# Patient Record
Sex: MALE | Race: WHITE | Employment: OTHER | URBAN - METROPOLITAN AREA
[De-identification: names, ages, dates, MRNs, and addresses within clinical notes are randomized per-mention and may not be internally consistent; named-entity substitution may affect disease eponyms.]

---

## 2020-02-13 ENCOUNTER — HOSPITAL ENCOUNTER (EMERGENCY)
Facility: CLINIC | Age: 65
Discharge: HOME | End: 2020-02-13
Attending: EMERGENCY MEDICINE | Admitting: EMERGENCY MEDICINE
Payer: COMMERCIAL

## 2020-02-13 VITALS
RESPIRATION RATE: 18 BRPM | HEIGHT: 69 IN | OXYGEN SATURATION: 98 % | SYSTOLIC BLOOD PRESSURE: 165 MMHG | TEMPERATURE: 98.5 F | DIASTOLIC BLOOD PRESSURE: 82 MMHG | HEART RATE: 99 BPM

## 2020-02-13 DIAGNOSIS — E87.6 HYPOKALEMIA: ICD-10-CM

## 2020-02-13 DIAGNOSIS — R73.9 HYPERGLYCEMIA: ICD-10-CM

## 2020-02-13 LAB
ALBUMIN SERPL-MCNC: 4.6 G/DL (ref 3.4–5)
ALP SERPL-CCNC: 122 U/L (ref 40–150)
ALT SERPL W P-5'-P-CCNC: 17 U/L (ref 0–70)
ANION GAP SERPL CALCULATED.3IONS-SCNC: 11 MMOL/L (ref 3–14)
AST SERPL W P-5'-P-CCNC: 12 U/L (ref 0–45)
BASE EXCESS BLDV CALC-SCNC: 0.5 MMOL/L
BASOPHILS # BLD AUTO: 0 10E9/L (ref 0–0.2)
BASOPHILS NFR BLD AUTO: 0 %
BILIRUB SERPL-MCNC: 0.4 MG/DL (ref 0.2–1.3)
BUN SERPL-MCNC: 20 MG/DL (ref 7–30)
CALCIUM SERPL-MCNC: 9.8 MG/DL (ref 8.5–10.1)
CHLORIDE SERPL-SCNC: 94 MMOL/L (ref 94–109)
CO2 SERPL-SCNC: 26 MMOL/L (ref 20–32)
CREAT SERPL-MCNC: 0.68 MG/DL (ref 0.66–1.25)
DIFFERENTIAL METHOD BLD: ABNORMAL
EOSINOPHIL # BLD AUTO: 0 10E9/L (ref 0–0.7)
EOSINOPHIL NFR BLD AUTO: 0 %
ERYTHROCYTE [DISTWIDTH] IN BLOOD BY AUTOMATED COUNT: 11.8 % (ref 10–15)
GFR SERPL CREATININE-BSD FRML MDRD: >90 ML/MIN/{1.73_M2}
GLUCOSE BLDC GLUCOMTR-MCNC: 308 MG/DL (ref 70–99)
GLUCOSE BLDC GLUCOMTR-MCNC: 313 MG/DL (ref 70–99)
GLUCOSE SERPL-MCNC: 322 MG/DL (ref 70–99)
HCO3 BLDV-SCNC: 26 MMOL/L (ref 21–28)
HCT VFR BLD AUTO: 48.7 % (ref 40–53)
HGB BLD-MCNC: 17 G/DL (ref 13.3–17.7)
KETONES BLD-SCNC: 0.6 MMOL/L (ref 0–0.6)
LYMPHOCYTES # BLD AUTO: 5.5 10E9/L (ref 0.8–5.3)
LYMPHOCYTES NFR BLD AUTO: 43 %
MCH RBC QN AUTO: 28.3 PG (ref 26.5–33)
MCHC RBC AUTO-ENTMCNC: 34.9 G/DL (ref 31.5–36.5)
MCV RBC AUTO: 81 FL (ref 78–100)
MICROCYTES BLD QL SMEAR: PRESENT
MONOCYTES # BLD AUTO: 1 10E9/L (ref 0–1.3)
MONOCYTES NFR BLD AUTO: 8 %
NEUTROPHILS # BLD AUTO: 6.3 10E9/L (ref 1.6–8.3)
NEUTROPHILS NFR BLD AUTO: 49 %
O2/TOTAL GAS SETTING VFR VENT: NORMAL %
OSMOLALITY SERPL: 320 MMOL/KG (ref 280–301)
OXYHGB MFR BLDV: 56 %
PCO2 BLDV: 44 MM HG (ref 40–50)
PH BLDV: 7.38 PH (ref 7.32–7.43)
PLATELET # BLD AUTO: 248 10E9/L (ref 150–450)
PLATELET # BLD EST: ABNORMAL 10*3/UL
PO2 BLDV: 31 MM HG (ref 25–47)
POTASSIUM SERPL-SCNC: 3 MMOL/L (ref 3.4–5.3)
PROT SERPL-MCNC: 8.9 G/DL (ref 6.8–8.8)
RBC # BLD AUTO: 6 10E12/L (ref 4.4–5.9)
SODIUM SERPL-SCNC: 131 MMOL/L (ref 133–144)
WBC # BLD AUTO: 12.9 10E9/L (ref 4–11)

## 2020-02-13 PROCEDURE — 96360 HYDRATION IV INFUSION INIT: CPT

## 2020-02-13 PROCEDURE — 83930 ASSAY OF BLOOD OSMOLALITY: CPT | Performed by: EMERGENCY MEDICINE

## 2020-02-13 PROCEDURE — 96361 HYDRATE IV INFUSION ADD-ON: CPT

## 2020-02-13 PROCEDURE — 85025 COMPLETE CBC W/AUTO DIFF WBC: CPT | Performed by: EMERGENCY MEDICINE

## 2020-02-13 PROCEDURE — 00000146 ZZHCL STATISTIC GLUCOSE BY METER IP

## 2020-02-13 PROCEDURE — 82805 BLOOD GASES W/O2 SATURATION: CPT | Performed by: EMERGENCY MEDICINE

## 2020-02-13 PROCEDURE — 80053 COMPREHEN METABOLIC PANEL: CPT | Performed by: EMERGENCY MEDICINE

## 2020-02-13 PROCEDURE — 82010 KETONE BODYS QUAN: CPT | Performed by: EMERGENCY MEDICINE

## 2020-02-13 PROCEDURE — 93005 ELECTROCARDIOGRAM TRACING: CPT

## 2020-02-13 PROCEDURE — 25000132 ZZH RX MED GY IP 250 OP 250 PS 637: Performed by: EMERGENCY MEDICINE

## 2020-02-13 PROCEDURE — 99284 EMERGENCY DEPT VISIT MOD MDM: CPT | Mod: 25

## 2020-02-13 PROCEDURE — 25800030 ZZH RX IP 258 OP 636: Performed by: EMERGENCY MEDICINE

## 2020-02-13 RX ORDER — POTASSIUM CHLORIDE 1.5 G/1.58G
20 POWDER, FOR SOLUTION ORAL ONCE
Status: COMPLETED | OUTPATIENT
Start: 2020-02-13 | End: 2020-02-13

## 2020-02-13 RX ORDER — LISINOPRIL 20 MG/1
20 TABLET ORAL DAILY
COMMUNITY
End: 2021-09-24

## 2020-02-13 RX ADMIN — SODIUM CHLORIDE 1000 ML: 9 INJECTION, SOLUTION INTRAVENOUS at 01:12

## 2020-02-13 RX ADMIN — POTASSIUM CHLORIDE 20 MEQ: 1.5 POWDER, FOR SOLUTION ORAL at 02:15

## 2020-02-13 ASSESSMENT — ENCOUNTER SYMPTOMS
FREQUENCY: 0
APPETITE CHANGE: 0
ABDOMINAL PAIN: 0
SHORTNESS OF BREATH: 0
WEAKNESS: 1

## 2020-02-13 NOTE — ED PROVIDER NOTES
History     Chief Complaint:  Hyperglycemia     HPI   Anson Moise is a 64 year old male with a history of hypertension and newly diagnosed type 2 diabetes who presents for evaluation of hyperglycemia to 628 noted at clinic during a wellness check today.  The patient was called earlier tonight informing him that he could go into a diabetic coma and urged him to come into the ED. Prior to his wellness check today, patient notes that he had been seen in clinic in January and had elevated sugars at that time as well. He was prescribed Metformin today and took one dose at dinner and his blood sugar on arrival here is 310. Anson consumed pork chops and bread this evening. He denies eating any sugary goods. He has been referred to diabetes education and has an appointment in nine days.     He does report of a dry mouth though believes this is due to his medications. He also has concerns regarding long standing issues with motor control in his bilateral lower extremities for the past 4 months, though has been followed for this and has an MRI scheduled later on today. He has not had recent falls and the patient has not recently hit his head. Denies chest pain, shortness of breath, frequency, increased thirst, abdominal pain, or any other concerns.       Allergies:  NKDA     Medications:    Keflex  Amoxicillin  Hydrochlorothiazide   Lisinopril  Metformin   Flonase     Past Medical History:    Type 2 diabetes  Hypertension     Past Surgical History:    History reviewed. No pertinent surgical history.     Family History:    No past pertinent family history.     Social History:  Smoking status: current everyday smoker   PCP: [facility]     Review of Systems   Constitutional: Negative for appetite change.   HENT:        Positive for dry mouth.   Respiratory: Negative for shortness of breath.    Cardiovascular: Negative for chest pain.   Gastrointestinal: Negative for abdominal pain.   Genitourinary: Negative for  "frequency and urgency.   Neurological: Positive for weakness (chronic in lower extremities).   All other systems reviewed and are negative.        Physical Exam     Patient Vitals for the past 24 hrs:   BP Temp Temp src Pulse Heart Rate Resp SpO2 Height   02/13/20 0145 -- -- -- -- -- -- 97 % --   02/13/20 0130 (!) 167/87 -- -- 98 -- -- 99 % --   02/13/20 0100 (!) 200/93 98.5  F (36.9  C) Oral -- 117 18 99 % 1.753 m (5' 9\")        Physical Exam  General: Patient is alert and anxious appearing.  HEENT: Head atraumatic    Eyes: pupils equal and reactive. Conjunctiva clear   Nares: patent   Oropharynx: no lesions, uvula midline, no palatal draping, normal voice, no trismus  Neck: Supple without lymphadenopathy, no meningismus  Chest: Heart regular rate and rhythm.   Lungs: Equal clear to auscultation with no wheeze or rales  Abdomen: Soft, non tender, nondistended, normal bowel sounds  Back: No costovertebral angle tenderness, no midline C, T or L spine tenderness  Neuro: Grossly nonfocal, normal speech, strength equal bilaterally, CN 2-12 intact  Extremities: No deformities, equal radial and DP pulses. No clubbing, cyanosis.  No edema  Skin: Warm and dry with no rash.       Emergency Department Course     Laboratory:  Blood gas venous and oxyhgb: pH 7.38, CO2 44, O2 31, bicarbonate 26, FIO2 room air, Oxyhemoglobin 56, base excess 0.5  Ketone beta-hydroxybutyrate: 0.6  Osmolality: 320 (H)  CMP: Glucose 322 (H), sodium 131 (L), potassium 3.0 (L), protein total 8.9 (H), o/w WNL (Creatinine: 0.68)  CBC: WBC 12.9 (H), HGB 17.0,   Glucose by meter: 313 (H)  Repeat glucose by meter: 308 (H)    Interventions:  0112: NS 1L IV Bolus   0215: Klor-Con 20 mEq PO    Emergency Department Course:  0102: Nursing notes and vitals reviewed. I performed an exam of the patient as documented above.     IV inserted. Medicine administered as noted above. Blood drawn. This was sent to the lab for further testing, results above.    0200: " I rechecked the patient and discussed the results of his workup thus far.     Findings and plan explained to the Patient. Patient discharged home with instructions regarding supportive care, medications, and reasons to return. The importance of close follow-up was reviewed.     I personally reviewed the laboratory results with the Patient and answered all related questions prior to discharge.      Impression & Plan      Medical Decision Making:  Anson Moise is a 64 year old male who presents for evaluation of elevated blood sugar.  By blood work there is no signs of DKA, no clinical features to suggest hyperosmolar issues.  Patient was noted to be hyperglycemic in January and on follow-up well visit with his primary care provider today blood work was sent and his blood sugar was noted to be over 600.  He was called to present to the emergency department.  Patient denies symptoms of hyperglycemia at this time.  He did start metformin 500 mg p.o. daily.  I have asked him to increase this to 500 mg twice daily between now and when he sees his diabetic educator Friday.  Recommended patient buy glucose monitor over-the-counter and check his blood sugar 4 times daily.  He is unable to do this himself over the counter asked him to discuss this with his primary care provider as well.  Return precautions the emergency department were reviewed at length.    Patient was noted to be hypokalemic and 1 dose of 20 mEq of potassium was provided here in the emergency department and recommendations for dietary increase in potassium was provided as well.  His creatinine is within normal limits.  Patient understands the need for close follow-up with his primary care physician for these issues.    In addition patient has a longstanding issue over the last several months with motor coordination of his lower extremities.  He was followed by a chiropractor.  He has MRI scheduled later today which he will follow-up with his primary care  provider.  He does not wish for us to address this issue today and states there is no new changes in this issue tonight and that is not the reason for his presentation to the emergency department.      Diagnosis:    ICD-10-CM    1. Hyperglycemia R73.9 Glucose by meter     Glucose by meter   2. Hypokalemia E87.6        Disposition:  discharged to home    Melissa DOZIER, am serving as a scribe at 1:02 AM on 2/13/2020 to document services personally performed by Chiara Mireles MD based on my observations and the provider's statements to me.       Bing Mckenna  2/13/2020    EMERGENCY DEPARTMENT       Chiara Mireles MD  02/13/20 0245

## 2020-02-13 NOTE — DISCHARGE INSTRUCTIONS
Check your blood sugar 4 times daily and keep log for your physician and diabetes education appointment.  Metformin 500mg by mouth twice daily.  Return to ER for dizziness, vomiting, change in vision, increased thirst, lightheadedness or other concerns.

## 2020-02-13 NOTE — ED TRIAGE NOTES
Pt reports he had a Wellness check at [facility] office and they called him tonight telling him his sugars was 628 and he could go into a diabetic coma. Pt reports feeling more thirsty today. Just started a pill for diabetes today.

## 2020-02-17 ENCOUNTER — APPOINTMENT (OUTPATIENT)
Dept: GENERAL RADIOLOGY | Facility: CLINIC | Age: 65
DRG: 064 | End: 2020-02-17
Attending: EMERGENCY MEDICINE
Payer: COMMERCIAL

## 2020-02-17 ENCOUNTER — HOSPITAL ENCOUNTER (INPATIENT)
Facility: CLINIC | Age: 65
LOS: 7 days | Discharge: HOME HEALTH | DRG: 064 | End: 2020-02-24
Attending: EMERGENCY MEDICINE | Admitting: HOSPITALIST
Payer: COMMERCIAL

## 2020-02-17 DIAGNOSIS — E11.10 LACTIC ACIDOSIS DUE TO DIABETES MELLITUS (H): ICD-10-CM

## 2020-02-17 DIAGNOSIS — A41.9 SEVERE SEPSIS (H): ICD-10-CM

## 2020-02-17 DIAGNOSIS — I48.0 PAROXYSMAL ATRIAL FIBRILLATION (H): ICD-10-CM

## 2020-02-17 DIAGNOSIS — E11.65 TYPE 2 DIABETES MELLITUS WITH HYPERGLYCEMIA, WITHOUT LONG-TERM CURRENT USE OF INSULIN (H): ICD-10-CM

## 2020-02-17 DIAGNOSIS — I63.9 CEREBROVASCULAR ACCIDENT (CVA), UNSPECIFIED MECHANISM (H): Primary | ICD-10-CM

## 2020-02-17 DIAGNOSIS — K21.9 GASTROESOPHAGEAL REFLUX DISEASE WITHOUT ESOPHAGITIS: ICD-10-CM

## 2020-02-17 DIAGNOSIS — R65.20 SEVERE SEPSIS (H): ICD-10-CM

## 2020-02-17 LAB
ALBUMIN SERPL-MCNC: 3.9 G/DL (ref 3.4–5)
ALBUMIN UR-MCNC: NEGATIVE MG/DL
ALP SERPL-CCNC: 83 U/L (ref 40–150)
ALT SERPL W P-5'-P-CCNC: 13 U/L (ref 0–70)
ANION GAP SERPL CALCULATED.3IONS-SCNC: 12 MMOL/L (ref 3–14)
APPEARANCE UR: CLEAR
AST SERPL W P-5'-P-CCNC: 9 U/L (ref 0–45)
BASE DEFICIT BLDV-SCNC: 1.7 MMOL/L
BASOPHILS # BLD AUTO: 0 10E9/L (ref 0–0.2)
BASOPHILS NFR BLD AUTO: 0.2 %
BILIRUB SERPL-MCNC: 0.9 MG/DL (ref 0.2–1.3)
BILIRUB UR QL STRIP: NEGATIVE
BUN SERPL-MCNC: 28 MG/DL (ref 7–30)
CALCIUM SERPL-MCNC: 9.8 MG/DL (ref 8.5–10.1)
CHLORIDE SERPL-SCNC: 94 MMOL/L (ref 94–109)
CO2 SERPL-SCNC: 21 MMOL/L (ref 20–32)
COLOR UR AUTO: ABNORMAL
CREAT SERPL-MCNC: 1.33 MG/DL (ref 0.66–1.25)
DIFFERENTIAL METHOD BLD: ABNORMAL
EOSINOPHIL # BLD AUTO: 0 10E9/L (ref 0–0.7)
EOSINOPHIL NFR BLD AUTO: 0.2 %
ERYTHROCYTE [DISTWIDTH] IN BLOOD BY AUTOMATED COUNT: 11.8 % (ref 10–15)
GFR SERPL CREATININE-BSD FRML MDRD: 56 ML/MIN/{1.73_M2}
GLUCOSE BLDC GLUCOMTR-MCNC: 209 MG/DL (ref 70–99)
GLUCOSE BLDC GLUCOMTR-MCNC: 232 MG/DL (ref 70–99)
GLUCOSE BLDC GLUCOMTR-MCNC: 397 MG/DL (ref 70–99)
GLUCOSE SERPL-MCNC: 396 MG/DL (ref 70–99)
GLUCOSE UR STRIP-MCNC: 300 MG/DL
HBA1C MFR BLD: 11.5 % (ref 0–5.6)
HCO3 BLDV-SCNC: 24 MMOL/L (ref 21–28)
HCT VFR BLD AUTO: 48.2 % (ref 40–53)
HGB BLD-MCNC: 17 G/DL (ref 13.3–17.7)
HGB UR QL STRIP: NEGATIVE
HYALINE CASTS #/AREA URNS LPF: 2 /LPF (ref 0–2)
IMM GRANULOCYTES # BLD: 0.1 10E9/L (ref 0–0.4)
IMM GRANULOCYTES NFR BLD: 0.4 %
INTERPRETATION ECG - MUSE: NORMAL
KETONES BLD-SCNC: 2.1 MMOL/L (ref 0–0.6)
KETONES UR STRIP-MCNC: 5 MG/DL
LACTATE BLD-SCNC: 1.2 MMOL/L (ref 0.7–2)
LACTATE BLD-SCNC: 1.3 MMOL/L (ref 0.7–2)
LACTATE BLD-SCNC: 3.2 MMOL/L (ref 0.7–2)
LEUKOCYTE ESTERASE UR QL STRIP: NEGATIVE
LYMPHOCYTES # BLD AUTO: 2.9 10E9/L (ref 0.8–5.3)
LYMPHOCYTES NFR BLD AUTO: 18.7 %
MCH RBC QN AUTO: 29.5 PG (ref 26.5–33)
MCHC RBC AUTO-ENTMCNC: 35.3 G/DL (ref 31.5–36.5)
MCV RBC AUTO: 84 FL (ref 78–100)
MONOCYTES # BLD AUTO: 1.9 10E9/L (ref 0–1.3)
MONOCYTES NFR BLD AUTO: 12.5 %
MUCOUS THREADS #/AREA URNS LPF: PRESENT /LPF
NEUTROPHILS # BLD AUTO: 10.4 10E9/L (ref 1.6–8.3)
NEUTROPHILS NFR BLD AUTO: 68 %
NITRATE UR QL: NEGATIVE
OXYHGB MFR BLDV: 57 %
PCO2 BLDV: 43 MM HG (ref 40–50)
PH BLDV: 7.35 PH (ref 7.32–7.43)
PH UR STRIP: 5 PH (ref 5–7)
PLATELET # BLD AUTO: 173 10E9/L (ref 150–450)
PLATELET # BLD AUTO: 208 10E9/L (ref 150–450)
PO2 BLDV: 32 MM HG (ref 25–47)
POTASSIUM SERPL-SCNC: 3.7 MMOL/L (ref 3.4–5.3)
PROT SERPL-MCNC: 7.6 G/DL (ref 6.8–8.8)
RBC # BLD AUTO: 5.76 10E12/L (ref 4.4–5.9)
RBC #/AREA URNS AUTO: 0 /HPF (ref 0–2)
SODIUM SERPL-SCNC: 127 MMOL/L (ref 133–144)
SOURCE: ABNORMAL
SP GR UR STRIP: 1 (ref 1–1.03)
UROBILINOGEN UR STRIP-MCNC: NORMAL MG/DL (ref 0–2)
WBC # BLD AUTO: 15.7 10E9/L (ref 4–11)
WBC #/AREA URNS AUTO: 2 /HPF (ref 0–5)

## 2020-02-17 PROCEDURE — 25800030 ZZH RX IP 258 OP 636: Performed by: HOSPITALIST

## 2020-02-17 PROCEDURE — 00000146 ZZHCL STATISTIC GLUCOSE BY METER IP

## 2020-02-17 PROCEDURE — 25800030 ZZH RX IP 258 OP 636

## 2020-02-17 PROCEDURE — 25800030 ZZH RX IP 258 OP 636: Performed by: EMERGENCY MEDICINE

## 2020-02-17 PROCEDURE — 36415 COLL VENOUS BLD VENIPUNCTURE: CPT | Performed by: HOSPITALIST

## 2020-02-17 PROCEDURE — 83605 ASSAY OF LACTIC ACID: CPT | Performed by: HOSPITALIST

## 2020-02-17 PROCEDURE — 99285 EMERGENCY DEPT VISIT HI MDM: CPT | Mod: 25

## 2020-02-17 PROCEDURE — 25000132 ZZH RX MED GY IP 250 OP 250 PS 637: Performed by: HOSPITALIST

## 2020-02-17 PROCEDURE — 25000128 H RX IP 250 OP 636

## 2020-02-17 PROCEDURE — 96365 THER/PROPH/DIAG IV INF INIT: CPT | Mod: 59

## 2020-02-17 PROCEDURE — 71046 X-RAY EXAM CHEST 2 VIEWS: CPT

## 2020-02-17 PROCEDURE — 99207 ZZC CDG-HISTORY COMP: MEETS EXP. PROB FOCUSED- DOWN CODED LACK OF PFSH: CPT | Performed by: HOSPITALIST

## 2020-02-17 PROCEDURE — 25000128 H RX IP 250 OP 636: Performed by: EMERGENCY MEDICINE

## 2020-02-17 PROCEDURE — 85049 AUTOMATED PLATELET COUNT: CPT | Performed by: HOSPITALIST

## 2020-02-17 PROCEDURE — 82805 BLOOD GASES W/O2 SATURATION: CPT | Performed by: EMERGENCY MEDICINE

## 2020-02-17 PROCEDURE — 83605 ASSAY OF LACTIC ACID: CPT | Performed by: EMERGENCY MEDICINE

## 2020-02-17 PROCEDURE — 85025 COMPLETE CBC W/AUTO DIFF WBC: CPT | Performed by: EMERGENCY MEDICINE

## 2020-02-17 PROCEDURE — 81001 URINALYSIS AUTO W/SCOPE: CPT | Performed by: EMERGENCY MEDICINE

## 2020-02-17 PROCEDURE — 87086 URINE CULTURE/COLONY COUNT: CPT | Performed by: EMERGENCY MEDICINE

## 2020-02-17 PROCEDURE — 82010 KETONE BODYS QUAN: CPT | Performed by: EMERGENCY MEDICINE

## 2020-02-17 PROCEDURE — 99221 1ST HOSP IP/OBS SF/LOW 40: CPT | Mod: AI | Performed by: HOSPITALIST

## 2020-02-17 PROCEDURE — 21000001 ZZH R&B HEART CARE

## 2020-02-17 PROCEDURE — 80053 COMPREHEN METABOLIC PANEL: CPT | Performed by: EMERGENCY MEDICINE

## 2020-02-17 PROCEDURE — 25000128 H RX IP 250 OP 636: Performed by: HOSPITALIST

## 2020-02-17 PROCEDURE — 96361 HYDRATE IV INFUSION ADD-ON: CPT

## 2020-02-17 PROCEDURE — 87040 BLOOD CULTURE FOR BACTERIA: CPT | Performed by: EMERGENCY MEDICINE

## 2020-02-17 PROCEDURE — 83036 HEMOGLOBIN GLYCOSYLATED A1C: CPT | Performed by: HOSPITALIST

## 2020-02-17 PROCEDURE — 36415 COLL VENOUS BLD VENIPUNCTURE: CPT | Performed by: EMERGENCY MEDICINE

## 2020-02-17 RX ORDER — DEXTROSE MONOHYDRATE 25 G/50ML
25-50 INJECTION, SOLUTION INTRAVENOUS
Status: DISCONTINUED | OUTPATIENT
Start: 2020-02-17 | End: 2020-02-24 | Stop reason: HOSPADM

## 2020-02-17 RX ORDER — PIPERACILLIN SODIUM, TAZOBACTAM SODIUM 3; .375 G/15ML; G/15ML
3.38 INJECTION, POWDER, LYOPHILIZED, FOR SOLUTION INTRAVENOUS EVERY 6 HOURS
Status: DISCONTINUED | OUTPATIENT
Start: 2020-02-18 | End: 2020-02-19

## 2020-02-17 RX ORDER — ONDANSETRON 4 MG/1
4 TABLET, ORALLY DISINTEGRATING ORAL EVERY 6 HOURS PRN
Status: DISCONTINUED | OUTPATIENT
Start: 2020-02-17 | End: 2020-02-24 | Stop reason: HOSPADM

## 2020-02-17 RX ORDER — FLUTICASONE PROPIONATE 50 MCG
2 SPRAY, SUSPENSION (ML) NASAL DAILY
COMMUNITY
End: 2021-09-24

## 2020-02-17 RX ORDER — NALOXONE HYDROCHLORIDE 0.4 MG/ML
.1-.4 INJECTION, SOLUTION INTRAMUSCULAR; INTRAVENOUS; SUBCUTANEOUS
Status: DISCONTINUED | OUTPATIENT
Start: 2020-02-17 | End: 2020-02-24 | Stop reason: HOSPADM

## 2020-02-17 RX ORDER — AMOXICILLIN 250 MG
1 CAPSULE ORAL 2 TIMES DAILY
Status: DISCONTINUED | OUTPATIENT
Start: 2020-02-17 | End: 2020-02-24 | Stop reason: HOSPADM

## 2020-02-17 RX ORDER — ACETAMINOPHEN 325 MG/1
650 TABLET ORAL EVERY 4 HOURS PRN
Status: DISCONTINUED | OUTPATIENT
Start: 2020-02-17 | End: 2020-02-24 | Stop reason: HOSPADM

## 2020-02-17 RX ORDER — HEPARIN SODIUM 5000 [USP'U]/.5ML
5000 INJECTION, SOLUTION INTRAVENOUS; SUBCUTANEOUS EVERY 12 HOURS
Status: DISCONTINUED | OUTPATIENT
Start: 2020-02-17 | End: 2020-02-19

## 2020-02-17 RX ORDER — HYDROCHLOROTHIAZIDE 25 MG/1
25 TABLET ORAL DAILY
Status: ON HOLD | COMMUNITY
End: 2021-09-27

## 2020-02-17 RX ORDER — AMOXICILLIN 250 MG
2 CAPSULE ORAL 2 TIMES DAILY
Status: DISCONTINUED | OUTPATIENT
Start: 2020-02-17 | End: 2020-02-24 | Stop reason: HOSPADM

## 2020-02-17 RX ORDER — LIDOCAINE 40 MG/G
CREAM TOPICAL
Status: DISCONTINUED | OUTPATIENT
Start: 2020-02-17 | End: 2020-02-24 | Stop reason: HOSPADM

## 2020-02-17 RX ORDER — OXYCODONE AND ACETAMINOPHEN 5; 325 MG/1; MG/1
1-2 TABLET ORAL EVERY 4 HOURS PRN
Status: DISCONTINUED | OUTPATIENT
Start: 2020-02-17 | End: 2020-02-24 | Stop reason: HOSPADM

## 2020-02-17 RX ORDER — LIDOCAINE 40 MG/G
CREAM TOPICAL
Status: DISCONTINUED | OUTPATIENT
Start: 2020-02-17 | End: 2020-02-17

## 2020-02-17 RX ORDER — NAPROXEN SODIUM 220 MG
220 TABLET ORAL DAILY
Status: ON HOLD | COMMUNITY
End: 2020-02-23

## 2020-02-17 RX ORDER — NICOTINE POLACRILEX 4 MG
15-30 LOZENGE BUCCAL
Status: DISCONTINUED | OUTPATIENT
Start: 2020-02-17 | End: 2020-02-24 | Stop reason: HOSPADM

## 2020-02-17 RX ORDER — PIPERACILLIN SODIUM, TAZOBACTAM SODIUM 4; .5 G/20ML; G/20ML
4.5 INJECTION, POWDER, LYOPHILIZED, FOR SOLUTION INTRAVENOUS ONCE
Status: COMPLETED | OUTPATIENT
Start: 2020-02-17 | End: 2020-02-17

## 2020-02-17 RX ORDER — NITROGLYCERIN 0.4 MG/1
0.4 TABLET SUBLINGUAL EVERY 5 MIN PRN
Status: DISCONTINUED | OUTPATIENT
Start: 2020-02-17 | End: 2020-02-24 | Stop reason: HOSPADM

## 2020-02-17 RX ORDER — SODIUM CHLORIDE 9 MG/ML
INJECTION, SOLUTION INTRAVENOUS CONTINUOUS
Status: DISCONTINUED | OUTPATIENT
Start: 2020-02-17 | End: 2020-02-19

## 2020-02-17 RX ORDER — METFORMIN HCL 500 MG
500 TABLET, EXTENDED RELEASE 24 HR ORAL
Status: ON HOLD | COMMUNITY
Start: 2020-02-12 | End: 2020-02-23

## 2020-02-17 RX ORDER — ONDANSETRON 2 MG/ML
4 INJECTION INTRAMUSCULAR; INTRAVENOUS EVERY 6 HOURS PRN
Status: DISCONTINUED | OUTPATIENT
Start: 2020-02-17 | End: 2020-02-24 | Stop reason: HOSPADM

## 2020-02-17 RX ORDER — CHLORAL HYDRATE 500 MG
1 CAPSULE ORAL DAILY
COMMUNITY
End: 2021-09-24

## 2020-02-17 RX ADMIN — VANCOMYCIN HYDROCHLORIDE 1500 MG: 5 INJECTION, POWDER, LYOPHILIZED, FOR SOLUTION INTRAVENOUS at 18:12

## 2020-02-17 RX ADMIN — PIPERACILLIN AND TAZOBACTAM 4.5 G: 4; .5 INJECTION, POWDER, FOR SOLUTION INTRAVENOUS at 18:10

## 2020-02-17 RX ADMIN — PIPERACILLIN AND TAZOBACTAM 3.38 G: 3; .375 INJECTION, POWDER, FOR SOLUTION INTRAVENOUS at 23:59

## 2020-02-17 RX ADMIN — SENNOSIDES AND DOCUSATE SODIUM 1 TABLET: 8.6; 5 TABLET ORAL at 20:59

## 2020-02-17 RX ADMIN — SODIUM CHLORIDE: 9 INJECTION, SOLUTION INTRAVENOUS at 21:03

## 2020-02-17 RX ADMIN — SODIUM CHLORIDE 1000 ML: 9 INJECTION, SOLUTION INTRAVENOUS at 15:48

## 2020-02-17 RX ADMIN — SODIUM CHLORIDE 1000 ML: 9 INJECTION, SOLUTION INTRAVENOUS at 17:41

## 2020-02-17 RX ADMIN — SODIUM CHLORIDE 1000 ML: 9 INJECTION, SOLUTION INTRAVENOUS at 15:51

## 2020-02-17 RX ADMIN — SODIUM CHLORIDE 2000 ML: 9 INJECTION, SOLUTION INTRAVENOUS at 16:30

## 2020-02-17 RX ADMIN — HEPARIN SODIUM 5000 UNITS: 5000 INJECTION, SOLUTION INTRAVENOUS; SUBCUTANEOUS at 20:59

## 2020-02-17 ASSESSMENT — ENCOUNTER SYMPTOMS
FEVER: 0
ABDOMINAL PAIN: 0
DYSURIA: 0
VOMITING: 0
NAUSEA: 1
BLOOD IN STOOL: 0
BACK PAIN: 0
SHORTNESS OF BREATH: 0
POLYPHAGIA: 1
CHILLS: 0
DIZZINESS: 1
LIGHT-HEADEDNESS: 1
POLYDIPSIA: 1

## 2020-02-17 ASSESSMENT — MIFFLIN-ST. JEOR
SCORE: 1569.17
SCORE: 1566.45

## 2020-02-17 NOTE — ED NOTES
DATE:  2/17/2020   TIME OF RECEIPT FROM LAB:  5894  LAB TEST:  ketones  LAB VALUE:  2.1  RESULTS GIVEN WITH READ-BACK TO (PROVIDER):  audrain  TIME LAB VALUE REPORTED TO PROVIDER:   7231

## 2020-02-17 NOTE — ED PROVIDER NOTES
History     Chief Complaint:  High blood glucose    HPI   Anson Moise is a 64 year old male who presents with high blood sugar levels. The patient has a significant recent history notable for newly diagnosed Type 2 diabetes and hypertension 4 days ago after he was seen in clinic for a routine wellness check. Notably, the patient states he was taking steroids for an inner ear infection but otherwise had not started any new medications. The patient had a measured blood glucose of 628 while in clinic and was started on Metformin once daily. He took his first tablet while at clinic. However, given the elevated blood glucose the patient was referred to the ED for evaluation. By the time of arriving to the ED, his repeat blood glucose was 310. He was given IV fluids and potassium and ultimately discharged with an increase of his Metformin to 500 mg BID. The patient states that since that time he has been taking the medications and halted his steroids and overall was feeling improved. However, this morning the patient states he felt nauseous, lightheaded and generally crummy. He checked his blood sugar and found it elevated at 500 despite his Metformin and comes here with his sister for evaluation. The patient states that he has not had any vomiting or diarrhea but has been very thirsty and peeing significantly more which he states is what he believes is due to the medication. He otherwise denies fevers, chills, vomiting, diarrhea, abdominal pain, flank pain, or confusion.    Allergies:  No known drug allergies     Medications:    Lisinopril  Metformin    Past Medical History:    Hypertension     Past Surgical History:    History reviewed. No pertinent surgical history.     Family History:    History reviewed. No pertinent family history.      Social History:  Smoking Status: Current Every Day Smoker  Alcohol Use: None recently   Patient presents with sister.   Marital Status:        Review of Systems  "  Constitutional: Negative for chills and fever.   HENT: Negative for congestion, ear discharge and ear pain.    Respiratory: Negative for shortness of breath.    Gastrointestinal: Positive for nausea. Negative for abdominal pain, blood in stool and vomiting.   Endocrine: Positive for polydipsia, polyphagia and polyuria.   Genitourinary: Negative for dysuria.   Musculoskeletal: Negative for back pain.   Neurological: Positive for dizziness and light-headedness.   All other systems reviewed and are negative.      Physical Exam   First Vitals:  Patient Vitals for the past 24 hrs:   BP Temp Temp src Pulse Heart Rate Resp SpO2 Height Weight   02/17/20 1857 (!) 147/76 98.1  F (36.7  C) Oral -- 93 20 99 % -- 79.1 kg (174 lb 6.4 oz)   02/17/20 1830 138/75 -- -- 93 94 10 97 % -- --   02/17/20 1815 (!) 140/88 -- -- 96 96 14 98 % -- --   02/17/20 1800 (!) 157/84 -- -- 98 101 28 99 % -- --   02/17/20 1730 137/70 -- -- 99 100 9 -- -- --   02/17/20 1645 121/80 -- -- 147 140 12 93 % -- --   02/17/20 1630 128/76 -- -- 135 138 20 95 % -- --   02/17/20 1615 104/78 -- -- 135 131 8 97 % -- --   02/17/20 1600 90/66 -- -- 142 144 11 98 % -- --   02/17/20 1519 (!) 81/54 96.1  F (35.6  C) -- -- 158 18 99 % 1.753 m (5' 9\") 79.4 kg (175 lb)          Physical Exam  Nursing note and vitals reviewed.  Constitutional:  Appears well-developed and well-nourished.   HENT:   Head:    Atraumatic.   Mouth/Throat:   Oropharynx is clear and moist. No oropharyngeal exudate.   Eyes:    Pupils are equal, round, and reactive to light.   Neck:    Normal range of motion. Neck supple.      No tracheal deviation present. No thyromegaly present.   Cardiovascular:  Tachycardic rate, regular rhythm, no murmur   Pulmonary/Chest: Breath sounds are clear and equal without wheezes or crackles.  Abdominal:   Soft. Bowel sounds are normal. Exhibits no distension and      no mass. There is no tenderness.      There is no rebound and no guarding.   Musculoskeletal: "  Exhibits no edema.   Lymphadenopathy:  No cervical adenopathy.   Neurological:   Alert and oriented to person, place, and time.   Skin:    Skin is warm and dry. No rash noted. No pallor.       Emergency Department Course     ECG (15:42:46):  Rate 154 bpm. KS interval *. QRS duration 84. QT/QTc 292/467. P-R-T axes * 68 52. Atrial fibrillation with RVR. Nonspecific ST abnormality. Abnormal ECG. Interpreted at 1543 by Chica Morris MD.     Imaging:  Radiographic findings were communicated with the patient who voiced understanding of the findings.    X-ray Chest, 2 views:  No acute airspace disease.  Result per radiology.     Laboratory:  CBC: WBC 15.7 (H), WNL (HGB 17.0, )  CMP: Na 127 (L), Glucose 396 (H), GFR 56 (L), Creatinine 1.33 (H)   Glucose: 397 (H)  1541: Lactic: 3.2 (H)  1753: Lactic acid: 1.2  Ketone: 2.1 (HH)  UA: Glucose 300, Ketone 5, mucous present, o/w negative  Urine culture: pending  Blood Culture x2: Pending     Venous Blood Gas  Recent Labs   Lab 02/17/20  1541 02/13/20  0113   PHV 7.35 7.38   PCO2V 43 44   PO2V 32 31   HCO3V 24 26   IAN  --  0.5   O2PER  --  room air      Interventions:  1548 - NS 1L IV Bolus   1551 - NS 1L IV Bolus   1630 - NS 2L IV Bolus   1741 - NS 1L IV Bolus   1810 - Zosyn 4.5 g IVPB  1812 - Vancomycin 1500 mg IV    Emergency Department Course:  Past medical records, nursing notes, and vitals reviewed.  1539: I performed an exam of the patient and obtained history, as documented above.      IV inserted and blood drawn.     The patient was sent for a X-ray while in the emergency department, findings above.     Findings and plan explained to the Patient who consents to admission.     1719: Discussed the patient with Dr. Bautista, who will admit the patient to a [facility] bed for further monitoring, evaluation, and treatment.      Impression & Plan      CMS Diagnoses:   The patient has signs of Severe Sepsis REMINDER: Please use septic shock SmartPhrase for Lactate > 4 or a  patient requiring vasopressors after initial fluid bolus (meaning persistent hypotension)      If one the following conditions is present, a 30 mL/kg bolus is recommended as part of the 6 hour bundle (IBW can be used for BMI >30, or document refusal/contraindication):      1.   Initial hypotension  defined as 2 bps < 90 or map < 65 in the 6hrs before or 6hrs after time zero.     2.  Lactate >4.     The patient has signs of Severe Sepsis as evidenced by:    1. 2 SIRS criteria, AND  2. Suspected infection, AND   3. Organ dysfunction: Lactic Acid > 2.0    Time severe sepsis diagnosis confirmed: 1622 02/17/20 as this was the time when Lactate resulted, and the level was > 2.0    3 Hour Severe Sepsis Bundle Completion:    1. Initial Lactic Acid Result:   Recent Labs   Lab Test 02/17/20  1753 02/17/20  1541   LACT 1.2 3.2*     2. Blood Cultures before Antibiotics: Yes  3. Broad Spectrum Antibiotics Administered:  yes       Anti-infectives (From admission through now)    Start     Dose/Rate Route Frequency Ordered Stop    02/17/20 1752  vancomycin 1500 mg in 0.9% NaCl 250 ml intermittent infusion 1,500 mg      1,500 mg  over 90 Minutes Intravenous ONCE 02/17/20 1752 02/17/20 1942    02/17/20 1723  piperacillin-tazobactam (ZOSYN) 4.5 g vial to attach to  mL bag      4.5 g Intravenous ONCE 02/17/20 1722 02/17/20 1810          4. Fluid volume administered in ED:  Full 30 mL/kg bolus given (see amount below).    BMI Readings from Last 1 Encounters:   02/17/20 25.75 kg/m      30 mL/kg fluids based on weight: 2,370 mL  30 mL/kg fluids based on IBW (must be >= 60 inches tall): 2,120 mL                Severe Sepsis reassessment:  1. Repeat Lactic Acid Level: 2.0  2. MAP>65 after initial IVF bolus, will continue to monitor fluid status and vital signs    Medical Decision Making:  This patient has generalized weakness which is due to hypotension and tachycardia, likely due to severe dehydration and lactic acidosis from  Metformin with hyperglycemia. I also considered the possibility of sepsis syndrome but he since he has an elevated lactic acid, elevated white blood cell count, hypotension, and tachycardia. However no source was found at this point. He was treated with broad spectrum antibiotics. He was initially in rapid atrial fibrillation and placed on a Diltiazem drip after IV fluid hydration, however afterwards he converted to normal sinus rhythm. He was admitted to the [facility] for further evaluation and treatment under the care of the hospitalist Dr. Min    Critical Care time was 45 minutes for this patient excluding procedures.     Diagnosis:    ICD-10-CM   1. Severe sepsis (H) A41.9    R65.20   2. Lactic acidosis due to diabetes mellitus (H) E11.10      3. Type 2 diabetes mellitus with hyperglycemia, without long-term current use of insulin (H) E11.65       Disposition:  Admitted to Dr. Michele Bridges  2/17/2020    EMERGENCY DEPARTMENT    Fady DOZIER, am serving as a scribe at 3:39 PM on 2/17/2020 to document services personally performed by Chica Morris MD based on my observations and the provider's statements to me.       Chica Morris MD  02/17/20 6400

## 2020-02-17 NOTE — H&P
[facility]    History and Physical  Hospitalist       Date of Admission:  2/17/2020    Assessment & Plan :    64 years old male with past medical history of hypertension and recent diagnosis with diabetes was started on metformin who presented today with generalized weakness found to have severe dehydration with SANTHOSH hyponatremia lactic acidosis and a new onset of A. fib RVR.    1.  Generalized weakness likely secondary to below with possible sepsis severe dehydration  Treatment as below continue monitoring and physical therapy evaluation    2.  Possible sepsis unclear source at this time patient presented with leukocytosis 15 blood pressure at admission systolic and 80s lactic acid at admission 3.2  Chest x-ray no acute changes urine analysis pending  We will start patient on antibiotics with vancomycin and Zosyn until rule out source of infection culture were sent    3.  Lactic acidosis expected secondary to metformin induced ketoacidosis sepsis recheck after hydration    4.  A. fib with RVR patient had a negative episode of A. fib RVR then he converted to sinus with IV fluid at the time I saw the patient he was sinus with heart rate in 90s.  We will hold on start diltiazem infusion was planned in the beginning I do not think he will need anticoagulation at this time.    5.  SANTHOSH creatinine at presentation 1.33 normal baseline likely secondary to hyperglycemia's with dehydration IV fluid and recheck consider further testing like ultrasound if no improving    6.  Hyponatremia 127 at admission expected hypovolemic hyponatremia also pseudohyponatremia with hyperglycemia    7.  Diabetes type 2 with recent diagnosis was started on metformin at this time we will hold metformin with suspicion for metformin induced ketoacidosis we will start sliding scale insulin consider alternative p.o. agents after stabilization    8.  Hypertension taking lisinopril at home will hold at this time with hypotension    9.   Chronic back pain although with no focal neurological exam but patient described weakness foot drop between time and time he plan to see a spine surgeon in the 20th of this month likely he will need a surgical intervention with a spine fusion.      DVT Prophylaxis: Heparin SQ  Code Status: Full Code    Martin Min MD    Primary Care Physician   **[facility]    Chief Complaint   Weakness     History is obtained from the patient    History of Present Illness   Anson Mosie is a 64 year old male who presents with high blood sugar levels. The patient has a significant recent history notable for newly diagnosed Type 2 diabetes and hypertension 4 days ago after he was seen in clinic for a routine wellness check. Notably, the patient states he was taking steroids for an inner ear infection but otherwise had not started any new medications. The patient had a measured blood glucose of 628 while in clinic and was started on Metformin once daily. He took his first tablet while at clinic. However, given the elevated blood glucose the patient was referred to the ED for evaluation. By the time of arriving to the ED, his repeat blood glucose was 310. He was given IV fluids and potassium and ultimately discharged with an increase of his Metformin to 500 mg BID. The patient states that since that time he has been taking the medications and halted his steroids and overall was feeling improved. However, this morning the patient states he felt nauseous, lightheaded and generally crummy. He checked his blood sugar and found it elevated at 500 despite his Metformin and comes here with his sister for evaluation. The patient states that he has not had any vomiting or diarrhea but has been very thirsty and peeing significantly more which he states is what he believes is due to the medication. He otherwise denies fevers, chills, vomiting, diarrhea, abdominal pain, flank pain, or confusion.    Past Medical History    Past  Medical History:   Diagnosis Date     Hypertension        Past Surgical History   Past Surgical History     Prior to Admission Medications   Prior to Admission Medications   Prescriptions Last Dose Informant Patient Reported? Taking?   AMOXICILLIN 875 MG OR TABS   No No   Si TABLET EVERY 12 HOURS   BACTROBAN 2 % EX CREA   No No   Sig: applt tid for 10 days   KEFLEX 500 MG OR CAPS   No No   Sig: TAKE ONE CAPSULE 4 TIMES DAILY   lisinopril (PRINIVIL/ZESTRIL) 20 MG tablet   Yes No   Sig: Take 20 mg by mouth daily   metFORMIN (GLUCOPHAGE-XR) 500 MG 24 hr tablet   Yes Yes   Sig: Take 500 mg by mouth      Facility-Administered Medications: None     Allergies   No Known Allergies    Social History   Anson Moise  reports that he has been smoking cigarettes. He has been smoking about 0.25 packs per day. He has never used smokeless tobacco. He reports current alcohol use.    Family History   Anson Moise family history is not on file.    Review of Systems   The 10 point Review of Systems is negative other than noted in the HPI or here.     Physical Exam   Temp: 96.1  F (35.6  C)   BP: 121/80 Pulse: 147 Heart Rate: 140 Resp: 12 SpO2: 93 %      Vital Signs with Ranges  Temp:  [96.1  F (35.6  C)] 96.1  F (35.6  C)  Pulse:  [135-147] 147  Heart Rate:  [131-158] 140  Resp:  [8-20] 12  BP: ()/(54-80) 121/80  SpO2:  [93 %-99 %] 93 %  175 lbs 0 oz    Constitutional: Awake, alert, cooperative, no apparent distress.  Eyes: Conjunctiva and pupils examined and normal.  HEENT: dry mucous membranes, normal dentition.  Respiratory: Clear to auscultation bilaterally, no crackles or wheezing.  Cardiovascular: ,normal S1 and S2, and no murmur noted.  GI: Soft, non-distended, non-tender, normal bowel sounds.  Skin: No rashes, no cyanosis, no edema.  Neurologic: AXOX3 no focal       Data   Data reviewed today:  I personally reviewed no images or EKG's today.  Recent Labs   Lab 20  1541 20  0110   WBC 15.7* 12.9*    HGB 17.0 17.0   MCV 84 81    248   * 131*   POTASSIUM 3.7 3.0*   CHLORIDE 94 94   CO2 21 26   BUN 28 20   CR 1.33* 0.68   ANIONGAP 12 11   SUZANNE 9.8 9.8   * 322*   ALBUMIN 3.9 4.6   PROTTOTAL 7.6 8.9*   BILITOTAL 0.9 0.4   ALKPHOS 83 122   ALT 13 17   AST 9 12       Imaging:  No results found for this or any previous visit (from the past 24 hour(s)).

## 2020-02-18 ENCOUNTER — APPOINTMENT (OUTPATIENT)
Dept: CT IMAGING | Facility: CLINIC | Age: 65
DRG: 064 | End: 2020-02-18
Attending: NURSE PRACTITIONER
Payer: COMMERCIAL

## 2020-02-18 ENCOUNTER — APPOINTMENT (OUTPATIENT)
Dept: CARDIOLOGY | Facility: CLINIC | Age: 65
DRG: 064 | End: 2020-02-18
Attending: INTERNAL MEDICINE
Payer: COMMERCIAL

## 2020-02-18 ENCOUNTER — APPOINTMENT (OUTPATIENT)
Dept: MRI IMAGING | Facility: CLINIC | Age: 65
DRG: 064 | End: 2020-02-18
Attending: HOSPITALIST
Payer: COMMERCIAL

## 2020-02-18 LAB
ANION GAP SERPL CALCULATED.3IONS-SCNC: 4 MMOL/L (ref 3–14)
BACTERIA SPEC CULT: NO GROWTH
BUN SERPL-MCNC: 23 MG/DL (ref 7–30)
CALCIUM SERPL-MCNC: 8.1 MG/DL (ref 8.5–10.1)
CHLORIDE SERPL-SCNC: 110 MMOL/L (ref 94–109)
CO2 SERPL-SCNC: 26 MMOL/L (ref 20–32)
CREAT SERPL-MCNC: 0.8 MG/DL (ref 0.66–1.25)
CRP SERPL-MCNC: 5.2 MG/L (ref 0–8)
ERYTHROCYTE [DISTWIDTH] IN BLOOD BY AUTOMATED COUNT: 11.7 % (ref 10–15)
ERYTHROCYTE [SEDIMENTATION RATE] IN BLOOD BY WESTERGREN METHOD: 8 MM/H (ref 0–20)
FOLATE SERPL-MCNC: 14.3 NG/ML
GFR SERPL CREATININE-BSD FRML MDRD: >90 ML/MIN/{1.73_M2}
GLUCOSE BLDC GLUCOMTR-MCNC: 170 MG/DL (ref 70–99)
GLUCOSE BLDC GLUCOMTR-MCNC: 194 MG/DL (ref 70–99)
GLUCOSE BLDC GLUCOMTR-MCNC: 238 MG/DL (ref 70–99)
GLUCOSE BLDC GLUCOMTR-MCNC: 264 MG/DL (ref 70–99)
GLUCOSE BLDC GLUCOMTR-MCNC: 275 MG/DL (ref 70–99)
GLUCOSE BLDC GLUCOMTR-MCNC: 288 MG/DL (ref 70–99)
GLUCOSE SERPL-MCNC: 188 MG/DL (ref 70–99)
HCT VFR BLD AUTO: 34.8 % (ref 40–53)
HCT VFR BLD AUTO: 36.4 % (ref 40–53)
HCT VFR BLD AUTO: 37 % (ref 40–53)
HEMOCCULT STL QL: NEGATIVE
HGB BLD-MCNC: 11.9 G/DL (ref 13.3–17.7)
HGB BLD-MCNC: 12.5 G/DL (ref 13.3–17.7)
HGB BLD-MCNC: 12.9 G/DL (ref 13.3–17.7)
IRON SATN MFR SERPL: 35 % (ref 15–46)
IRON SERPL-MCNC: 75 UG/DL (ref 35–180)
Lab: NORMAL
MCH RBC QN AUTO: 29.5 PG (ref 26.5–33)
MCHC RBC AUTO-ENTMCNC: 34.9 G/DL (ref 31.5–36.5)
MCV RBC AUTO: 85 FL (ref 78–100)
PLATELET # BLD AUTO: 146 10E9/L (ref 150–450)
POTASSIUM SERPL-SCNC: 3.6 MMOL/L (ref 3.4–5.3)
RBC # BLD AUTO: 4.38 10E12/L (ref 4.4–5.9)
SODIUM SERPL-SCNC: 140 MMOL/L (ref 133–144)
SPECIMEN SOURCE: NORMAL
TIBC SERPL-MCNC: 215 UG/DL (ref 240–430)
TSH SERPL DL<=0.005 MIU/L-ACNC: 0.96 MU/L (ref 0.4–4)
VIT B12 SERPL-MCNC: 212 PG/ML (ref 193–986)
WBC # BLD AUTO: 5.9 10E9/L (ref 4–11)

## 2020-02-18 PROCEDURE — A9585 GADOBUTROL INJECTION: HCPCS | Performed by: HOSPITALIST

## 2020-02-18 PROCEDURE — 25000131 ZZH RX MED GY IP 250 OP 636 PS 637: Performed by: HOSPITALIST

## 2020-02-18 PROCEDURE — 25800030 ZZH RX IP 258 OP 636

## 2020-02-18 PROCEDURE — 93010 ELECTROCARDIOGRAM REPORT: CPT | Performed by: INTERNAL MEDICINE

## 2020-02-18 PROCEDURE — 99221 1ST HOSP IP/OBS SF/LOW 40: CPT | Mod: 25 | Performed by: INTERNAL MEDICINE

## 2020-02-18 PROCEDURE — 85014 HEMATOCRIT: CPT | Performed by: HOSPITALIST

## 2020-02-18 PROCEDURE — 25500064 ZZH RX 255 OP 636: Performed by: HOSPITALIST

## 2020-02-18 PROCEDURE — 86140 C-REACTIVE PROTEIN: CPT | Performed by: HOSPITALIST

## 2020-02-18 PROCEDURE — 84443 ASSAY THYROID STIM HORMONE: CPT | Performed by: HOSPITALIST

## 2020-02-18 PROCEDURE — 93306 TTE W/DOPPLER COMPLETE: CPT

## 2020-02-18 PROCEDURE — 83540 ASSAY OF IRON: CPT | Performed by: HOSPITALIST

## 2020-02-18 PROCEDURE — 70498 CT ANGIOGRAPHY NECK: CPT

## 2020-02-18 PROCEDURE — 93306 TTE W/DOPPLER COMPLETE: CPT | Mod: 26 | Performed by: INTERNAL MEDICINE

## 2020-02-18 PROCEDURE — 00000146 ZZHCL STATISTIC GLUCOSE BY METER IP

## 2020-02-18 PROCEDURE — 93005 ELECTROCARDIOGRAM TRACING: CPT

## 2020-02-18 PROCEDURE — 99221 1ST HOSP IP/OBS SF/LOW 40: CPT | Performed by: NURSE PRACTITIONER

## 2020-02-18 PROCEDURE — 25000132 ZZH RX MED GY IP 250 OP 250 PS 637: Performed by: INTERNAL MEDICINE

## 2020-02-18 PROCEDURE — 82607 VITAMIN B-12: CPT | Performed by: HOSPITALIST

## 2020-02-18 PROCEDURE — 25000128 H RX IP 250 OP 636: Performed by: NURSE PRACTITIONER

## 2020-02-18 PROCEDURE — 25000128 H RX IP 250 OP 636: Performed by: HOSPITALIST

## 2020-02-18 PROCEDURE — 80048 BASIC METABOLIC PNL TOTAL CA: CPT | Performed by: HOSPITALIST

## 2020-02-18 PROCEDURE — 85018 HEMOGLOBIN: CPT | Performed by: HOSPITALIST

## 2020-02-18 PROCEDURE — 85652 RBC SED RATE AUTOMATED: CPT | Performed by: HOSPITALIST

## 2020-02-18 PROCEDURE — 82746 ASSAY OF FOLIC ACID SERUM: CPT | Performed by: HOSPITALIST

## 2020-02-18 PROCEDURE — 83550 IRON BINDING TEST: CPT | Performed by: HOSPITALIST

## 2020-02-18 PROCEDURE — 82272 OCCULT BLD FECES 1-3 TESTS: CPT | Performed by: HOSPITALIST

## 2020-02-18 PROCEDURE — 12000000 ZZH R&B MED SURG/OB

## 2020-02-18 PROCEDURE — 99233 SBSQ HOSP IP/OBS HIGH 50: CPT | Performed by: HOSPITALIST

## 2020-02-18 PROCEDURE — 25000128 H RX IP 250 OP 636

## 2020-02-18 PROCEDURE — 36415 COLL VENOUS BLD VENIPUNCTURE: CPT | Performed by: HOSPITALIST

## 2020-02-18 PROCEDURE — 70553 MRI BRAIN STEM W/O & W/DYE: CPT

## 2020-02-18 PROCEDURE — 25800030 ZZH RX IP 258 OP 636: Performed by: HOSPITALIST

## 2020-02-18 PROCEDURE — 25000125 ZZHC RX 250: Performed by: NURSE PRACTITIONER

## 2020-02-18 PROCEDURE — 85027 COMPLETE CBC AUTOMATED: CPT | Performed by: HOSPITALIST

## 2020-02-18 RX ORDER — METOPROLOL TARTRATE 25 MG/1
25 TABLET, FILM COATED ORAL 2 TIMES DAILY
Status: DISCONTINUED | OUTPATIENT
Start: 2020-02-18 | End: 2020-02-23

## 2020-02-18 RX ORDER — GADOBUTROL 604.72 MG/ML
8 INJECTION INTRAVENOUS ONCE
Status: COMPLETED | OUTPATIENT
Start: 2020-02-18 | End: 2020-02-18

## 2020-02-18 RX ORDER — IOPAMIDOL 755 MG/ML
70 INJECTION, SOLUTION INTRAVASCULAR ONCE
Status: COMPLETED | OUTPATIENT
Start: 2020-02-18 | End: 2020-02-18

## 2020-02-18 RX ORDER — POLYETHYLENE GLYCOL 3350 17 G/17G
238 POWDER, FOR SOLUTION ORAL ONCE
Status: COMPLETED | OUTPATIENT
Start: 2020-02-18 | End: 2020-02-18

## 2020-02-18 RX ORDER — LISINOPRIL 5 MG/1
5 TABLET ORAL DAILY
Status: DISCONTINUED | OUTPATIENT
Start: 2020-02-18 | End: 2020-02-23

## 2020-02-18 RX ADMIN — SODIUM CHLORIDE 90 ML: 9 INJECTION, SOLUTION INTRAVENOUS at 17:08

## 2020-02-18 RX ADMIN — PIPERACILLIN AND TAZOBACTAM 3.38 G: 3; .375 INJECTION, POWDER, FOR SOLUTION INTRAVENOUS at 13:00

## 2020-02-18 RX ADMIN — HEPARIN SODIUM 5000 UNITS: 5000 INJECTION, SOLUTION INTRAVENOUS; SUBCUTANEOUS at 09:20

## 2020-02-18 RX ADMIN — VANCOMYCIN HYDROCHLORIDE 1500 MG: 5 INJECTION, POWDER, LYOPHILIZED, FOR SOLUTION INTRAVENOUS at 05:52

## 2020-02-18 RX ADMIN — METOPROLOL TARTRATE 25 MG: 25 TABLET ORAL at 20:59

## 2020-02-18 RX ADMIN — INSULIN ASPART 3 UNITS: 100 INJECTION, SOLUTION INTRAVENOUS; SUBCUTANEOUS at 18:23

## 2020-02-18 RX ADMIN — POLYETHYLENE GLYCOL 3350 238 G: 17 POWDER, FOR SOLUTION ORAL at 22:50

## 2020-02-18 RX ADMIN — SODIUM CHLORIDE: 9 INJECTION, SOLUTION INTRAVENOUS at 22:12

## 2020-02-18 RX ADMIN — SODIUM CHLORIDE: 9 INJECTION, SOLUTION INTRAVENOUS at 07:31

## 2020-02-18 RX ADMIN — INSULIN ASPART 2 UNITS: 100 INJECTION, SOLUTION INTRAVENOUS; SUBCUTANEOUS at 14:20

## 2020-02-18 RX ADMIN — HUMAN ALBUMIN MICROSPHERES AND PERFLUTREN 9 ML: 10; .22 INJECTION, SOLUTION INTRAVENOUS at 11:00

## 2020-02-18 RX ADMIN — VANCOMYCIN HYDROCHLORIDE 1500 MG: 5 INJECTION, POWDER, LYOPHILIZED, FOR SOLUTION INTRAVENOUS at 18:50

## 2020-02-18 RX ADMIN — INSULIN ASPART 2 UNITS: 100 INJECTION, SOLUTION INTRAVENOUS; SUBCUTANEOUS at 09:20

## 2020-02-18 RX ADMIN — PIPERACILLIN AND TAZOBACTAM 3.38 G: 3; .375 INJECTION, POWDER, FOR SOLUTION INTRAVENOUS at 17:58

## 2020-02-18 RX ADMIN — PIPERACILLIN AND TAZOBACTAM 3.38 G: 3; .375 INJECTION, POWDER, FOR SOLUTION INTRAVENOUS at 05:51

## 2020-02-18 RX ADMIN — IOPAMIDOL 70 ML: 755 INJECTION, SOLUTION INTRAVENOUS at 17:08

## 2020-02-18 RX ADMIN — HEPARIN SODIUM 5000 UNITS: 5000 INJECTION, SOLUTION INTRAVENOUS; SUBCUTANEOUS at 20:59

## 2020-02-18 RX ADMIN — INSULIN GLARGINE 10 UNITS: 100 INJECTION, SOLUTION SUBCUTANEOUS at 13:03

## 2020-02-18 RX ADMIN — GADOBUTROL 8 ML: 604.72 INJECTION INTRAVENOUS at 12:32

## 2020-02-18 RX ADMIN — METOPROLOL TARTRATE 25 MG: 25 TABLET ORAL at 09:20

## 2020-02-18 RX ADMIN — LISINOPRIL 5 MG: 5 TABLET ORAL at 09:20

## 2020-02-18 ASSESSMENT — ACTIVITIES OF DAILY LIVING (ADL)
ADLS_ACUITY_SCORE: 16
ADLS_ACUITY_SCORE: 13
ADLS_ACUITY_SCORE: 14

## 2020-02-18 ASSESSMENT — MIFFLIN-ST. JEOR: SCORE: 1572.8

## 2020-02-18 NOTE — PLAN OF CARE
DATE & TIME: 2/18/20 7894-7339, pt arrived after 1200.   Cognitive Concerns/ Orientation : AxOx4. Pt reports tingling in his BLE [reports it's been going on for a couple weeks].   BEHAVIOR & AGGRESSION TOOL COLOR: Green  CIWA SCORE: NA   ABNL VS/O2: VSS, pt tolerating RA.  MOBILITY: Assist of 1 with GB. Pt reporting unsteady gait.   PAIN MANAGMENT: Denies  DIET: Mod CHO and cardiac diet  BOWEL/BLADDER: Continent, ambulating to restroom. No BM on shift.   ABNL LAB/BG: , 2 units insulin given, Hgb 11.9 [trending down - see results].   DRAIN/DEVICES: 3 PIV, 1 infusing  TELEMETRY RHYTHM: SR with BBB  SKIN: NA  TESTS/PROCEDURES: CTA head and neck at 1440, NPO. MRI done today, results show recent infarcts. MD notified and consulted placed.   D/C DAY/GOALS/PLACE: TBD, pending.   OTHER IMPORTANT INFO: PT, OT, ST and neurology consults placed. Pt transferred from heart center. Was admitted with nausea and feeling light headed. Pt has recently been diagnosed with type II diabetes and HTN. Pt's BS were high upon admission. Pt was found to be in afibb RVR in ED but self converted. Pt started on metoprolol.

## 2020-02-18 NOTE — PROGRESS NOTES
[facility]  Medicine Progress Note - Hospitalist Service       Date of Admission:  2/17/2020        Interval History   Resumed care this am. Chart reviewed.   -Converted to sinus overnight, rate controlled, denies dizziness this morning, no chest pain or dyspnea.  No palpitation.  -Reports low back pain, started around Thanksgiving, has been progressive, was evaluated by retractor as outpatient.  Low back pain improved but has noticed progressive weakness of right lower extremity.  Has fallen without any significant injuries.  Reports his right knee joel.   -Taking OTC ibuprofen once a day (looks like 200 mg p.o. daily), denies hematochezia or melena.  No gastric abdominal pain or heartburn.  Never had EGD or colonoscopy.  Was supposed to get colonoscopy as outpatient.    Assessment & Plan   Anson Moise is a 64 years old male with PMH of HTN, ongoing RLE weakness and back pain and recently diagnosed DM2 presented to ER with generalized weakness. Noted to have severe dehydration with SANTHOSH, pseudohyponatremia, lactic acidosis and a new onset of A. fib RVR.  Admitted as IMC on 2/17/2020 for further management.     SIRS: Tachycardic, marked leukocytosis, lactic acidosis and hypotensive to 80s (but while in A fib RVR) without clear source of infection.  CXR negative, UA unremarkable, ongoing back pain but has improved, no diarrhea or abdominal pain.  Afebrile.  Presume SIRS due to severe dehydration, possibly osmotic diuresis from uncontrolled blood sugars.   -Blood cultures sent from ER, started on vancomycin and Zosyn, if cultures are negative for next 24 hours, will discontinue antibiotics and monitor.  -Lactic acidosis and leukocytosis resolved with IV hydration.  BP stable.  -Orthospine eval given right leg weakness and back pain in the setting of SIRS, see below.     A. fib with RVR  HTN  Patient had anepisode of A. fib RVR but then he converted to sinus with IV fluid   resuscitation.  -  TSH  - ECHO  - Cardiology consulted, started on metoprolol, lisinopril resumed. Consideration of anticoagulation given [id number] score of minimum 2.  Continue to monitor on telemetry, currently sinus and rate controlled.     Acute kidney injury: Cr at presentation 1.33, normal baseline.  Patient was recently diagnosed with DM 2, was restarted on metformin.  Had blood sugar of up to 600 at the time of diagnosis.  Suspect ongoing hyperosmotic diuresis due to uncontrolled blood sugar, prerenal state and dehydration causing SANTHOSH.  -Cr normal with IV hydration  -UA without proteinuria     Pseudo-hyponatremia: Sodium 127 at admission expected, corrected 134 for blood sugar of 396.   -Normal with IV hydration.     DM, type 2: Blood sugar was noted to be more than 600 on routine labs as outpatient 5 days PTA.  Was started on metformin.  HbA1c is 11.5.   -Even severely elevated A1c, he will likely needs to be discharged on insulin.  -Has been started on insulin sliding scale, will add basal insulin, Lantus 10 units every morning and aspart per carb counting.   -Will resume low-dose metformin as well at discharge since renal function is normal now.     Chronic back pain  RLE weakness  Ongoing low back pain since around Thanksgiving.  Though back pain is improved, progressive right lower extremity noted.  Patient has fallen without any injuries since his right knee joel.  Was evaluated by PCP and had MRI of the lumbar spine as outpatient 2/14.  Shows mild anterior wedging subacute compression fracture of L1 with 25% loss of vertebral body height.  Minimal retropulsion of the posterior superior corner.  -Orthospine consult, ?  Scan rest of the spines, cervical and thoracic as well given his presentation with SIRS, and the outpatient MRI may not explain his findings of RLE weakness.  -Given recent a fib RVR and RLE weakness, r/o CVA. Will get MRI brain.     Addendum:  MRI shows stroke, may not explain his leg weakness though.  But given a fib he will need AC, warfarin vs DOAC. Will consult neurology. Hold off any anticoagulation until spine surgery eval.     Anemia, acute, mild.   Presented with hemoglobin of 17, noted same Hb level as outpatient last month.   -Noted drop in Hb down to 12 this morning.  Patient does take low-dose ibuprofen daily but no hematochezia or melena.    -Follow H&H every 8 hours for next 24 hours, suspect this is dilutional is all cell lines have dropped.  -Was due for colonoscopy as outpatient, has never had one.  -Check iron panel, B12 folate, stool for guaiac.  If iron deficiency are quite positive, will consult GI this stay since ongoing discussion about anticoagulation for A. Fib.     Diet: Combination Diet Low Saturated Fat Na <2400mg Diet, No Caffeine Diet    DVT Prophylaxis: Heparin SQ and Pneumatic Compression Devices  Chilel Catheter: not present  Code Status: Full Code      Disposition Plan   Expected discharge: 2+ days, recommended to prior living arrangement once pending clinical course.  Entered: Staci Chan MD 02/18/2020, 9:52 AM     The patient's care was discussed with the Bedside Nurse and Patient.    Staci Chan MD  Hospitalist Service  [facility]    ______________________________________________________________________      Data reviewed today: I reviewed all medications, new labs and imaging results over the last 24 hours. I personally reviewed the EKG tracing showing A. fib, RVR in 150s on admission, currently sinus rhythm, HR in the 80s.    Physical Exam   Vital Signs: Temp: 98.7  F (37.1  C) Temp src: Oral BP: (!) 159/74 Pulse: 76 Heart Rate: 92 Resp: 20 SpO2: 95 % O2 Device: None (Room air) Oxygen Delivery: 2 LPM  Weight: 175 lbs 12.8 oz    General: AAOx3, appears comfortable.  HEENT: PERRLA EOMI. Mucosa moist.   Lungs: Bilateral equal air entry. Clear to auscultation, normal work of breathing.   CVS: S1S2 regular, no tachycardia or murmur.   Abdomen: Soft, NT,  ND. BS heard.  MSK: No edema or deformities.  Neuro: AAOX3. CN 2-12 normal.  Right lower extremity weakness especially on hip flexion and knee flexion of ankle which is 3/5.  Plantar flexion is normal.  Babinski's-withdrawal bilaterally.  Sensation intact.  Skin: No rash.     Data   Recent Labs   Lab 02/18/20  0526 02/17/20 2025 02/17/20  1541 02/13/20  0110   WBC 5.9  --  15.7* 12.9*   HGB 12.9*  --  17.0 17.0   MCV 85  --  84 81   * 173 208 248     --  127* 131*   POTASSIUM 3.6  --  3.7 3.0*   CHLORIDE 110*  --  94 94   CO2 26  --  21 26   BUN 23  --  28 20   CR 0.80  --  1.33* 0.68   ANIONGAP 4  --  12 11   SUZANNE 8.1*  --  9.8 9.8   *  --  396* 322*   ALBUMIN  --   --  3.9 4.6   PROTTOTAL  --   --  7.6 8.9*   BILITOTAL  --   --  0.9 0.4   ALKPHOS  --   --  83 122   ALT  --   --  13 17   AST  --   --  9 12     Recent Results (from the past 24 hour(s))   XR Chest 2 Views    Narrative    XR CHEST TWO VIEWS   2/17/2020 5:06 PM     HISTORY: Hypotension and tachycardia with shoulder pain, check for  pneumonia.    COMPARISON: None available      Impression    IMPRESSION: No acute airspace disease.    ZINA DALEY MD     Medications     sodium chloride 100 mL/hr at 02/18/20 0731       heparin ANTICOAGULANT  5,000 Units Subcutaneous Q12H     insulin aspart  1-7 Units Subcutaneous TID AC     insulin aspart  1-5 Units Subcutaneous At Bedtime     insulin glargine  10 Units Subcutaneous QAM AC     lisinopril  5 mg Oral Daily     metoprolol tartrate  25 mg Oral BID     piperacillin-tazobactam  3.375 g Intravenous Q6H     senna-docusate  1 tablet Oral BID    Or     senna-docusate  2 tablet Oral BID     sodium chloride (PF)  3 mL Intracatheter Q8H     vancomycin (VANCOCIN) IV  1,500 mg Intravenous Q12H

## 2020-02-18 NOTE — PROGRESS NOTES
Transfer    S- Transfer to [facility] from [facility].    B- Pt presented nausea and feeling lightheaded. Had elevated BG and was found in a-fibb RVR. Pt self converted without intervention. Pt had elevated lactic intially of 3.2. Pt reports BLE tingling that started before admission. Pt reports being a newly diagnosed type 2 diabetic. Pt reports unsteady gait that started with the leg tingling [weeks ago].    A- Brief systems assessment: Pt is alert and oriented x4, spontaneously opening eyes. Respirations are equal and non-labored. Pt denies SOB. Radial pulse regular. Pt placed on telemetry with a rhythm of SR with BBB. Lung sounds clear and equal bilaterally.     R- Transfer to [facility] with telemetry per physician orders. Continue to monitor pt and update physician as needed.     Code status: Full Code  Skin: WDL  Fall Risk: Yes- Department fall risk interventions implemented.  Isolation: None  Patient belongings: Remain with pt, placed in closet. Documented in flowsheets.   Medication drips upon transfer: None  Bedside Report Letter Given and explained to pt: No

## 2020-02-18 NOTE — PROGRESS NOTES
"CLINICAL NUTRITION SERVICES  -  ASSESSMENT NOTE  Malnutrition:   % Weight Loss:  None noted  % Intake:  No decreased intake noted  Subcutaneous Fat Loss:  None observed  Muscle Loss:  None observed  Fluid Retention:  None noted per chart review    Malnutrition Diagnosis: Patient does not meet two of the above criteria necessary for diagnosing malnutrition      REASON FOR ASSESSMENT  Anson Moise is a 64 year old male seen by Registered Dietitian for Admission Nutrition Risk Screen for new/uncontrolled diabetes    NUTRITION HISTORY  Per Physician note on 2/17:  \"The patient has a significant recent history notable for newly diagnosed Type 2 diabetes and hypertension 4 days ago after he was seen in clinic for a routine wellness check. Notably, the patient states he was taking steroids for an inner ear infection but otherwise had not started any new medications. The patient had a measured blood glucose of 628 while in clinic and was started on Metformin once daily.\"    - Information obtained from patient:  The patient reported he typically eats a large breakfast because he knows he will be going to work for the next 6 hours and will not get a lunch break. However, he stated he does snack throughout the day. Then he will eat dinner in the evening. He reported he does not eat out very often, maybe once a week. He likes very plain food and does not really like vegetables.  His typical daily intake is the following:  B: Eggs, cabello, hashbrowns, orange juice  S: Popcorn, fruit or nuts  D: Meat (pork, beef, chicken, etc.) with a salad sometimes or potatoes/tater tots.   Drinks: Orange juice, milk, diet coke or diet mountain dew    The patient stated he will be going to his wellness clinic for diabetes education on 2/28 but would like a brief education in the meantime so he can learn a little bit about what a carbohydrate is and be more aware of what he is eating.       CURRENT NUTRITION ORDERS  Diet Order:     Low " "Saturated Fat/2400 mg Sodium     Current Intake/Tolerance:  The patient has consumed 100% of one meal since admission.     1x BM this morning.     NUTRITION FOCUSED PHYSICAL ASSESSMENT FOR DIAGNOSING MALNUTRITION)  Yes           Observed:    No nutrition-related physical findings observed    Obtained from Chart/Interdisciplinary Team:  None at this time.     ANTHROPOMETRICS  Height: 1.753 (5' 9\")  Weight: 79.1 kg (174 lb 6.4 oz)  Body mass index is 25.74 kg/m .  Weight Status:  Overweight BMI 25-29.9  IBW: 73 kg  % IBW: 108%  Weight History: Per care everywhere, the patient weighed 79.4 kg on 1/22/20 therefore the patient has not had any significant weight loss.   Wt Readings from Last 10 Encounters:   02/18/20 79.7 kg (175 lb 12.8 oz)   09/14/06 91.6 kg (202 lb)   07/14/06 86.2 kg (190 lb)     LABS  Labs reviewed  K+ 3.6 WNL   A1C on 2/17 - 11.5     Recent Labs   Lab 02/18/20  1311 02/18/20  1153 02/18/20  0813 02/18/20  0526 02/18/20  0158 02/17/20  2204 02/17/20  1908 02/17/20  1541  02/13/20  0110   GLC  --   --   --  188*  --   --   --  396*  --  322*   * 288* 194*  --  170* 209* 232*  --    < >  --     < > = values in this interval not displayed.       MEDICATIONS  Medications reviewed  Medium sliding scale insulin  Lantus 10 units per day   IVF @ 100 ml/hr     ASSESSED NUTRITION NEEDS PER APPROVED PRACTICE GUIDELINES:  Dosing Weight 79 kg - lowest weight this admission  Estimated Energy Needs: 3406-8892 kcals (25-30 Kcal/Kg)  Justification: maintenance  Estimated Protein Needs: 79-95 grams protein (1-1.2 g pro/Kg)  Justification: maintenance  Estimated Fluid Needs: (1 mL/Kcal)  Justification: maintenance    MALNUTRITION:  % Weight Loss:  None noted  % Intake:  No decreased intake noted  Subcutaneous Fat Loss:  None observed  Muscle Loss:  None observed  Fluid Retention:  None noted per chart review    Malnutrition Diagnosis: Patient does not meet two of the above criteria necessary for diagnosing " malnutrition    NUTRITION DIAGNOSIS:  Food and nutrition-related knowledge deficit related to carbohydrate counting as evidenced by recent Type 2 diabetes diagnosis and pt stating he has received no prior education.     NUTRITION INTERVENTIONS  Recommendations / Nutrition Prescription  Continue with current diet order.     Implementation  Nutrition education: Provided education on carbohydrate counting and general staying healthy with diabetes. Provided the following handouts to the patient: Carbohydrate Counting and Living Healthy with Diabetes. The patient appeared to understand the teaching and will receive further education at his wellness clinic on 2/28.     Nutrition Goals  Pt will consume % of three meals or snacks/supplements per day.   BG <180     MONITORING AND EVALUATION:  Progress towards goals will be monitored and evaluated per protocol and Practice Guidelines    Alondra Salinas  Dietetic Intern

## 2020-02-18 NOTE — CONSULTS
Anticoagulation coverage check.  Patient has Medicare D through [employer] with $295 (of $295) unmet deductible.    Xarelto/Eliquis  Feb:  Upon receipt of RX, Discharge [facility] can dispense 1 month free.  March: $342  (fulfills $295 deductible)  April-Oct: $47/mo  Nov-Dec: $124/mo (coverage gap)    Pradaxa is not covered.    Jantoven (warfarin)  $4/mo      -LIBAN Levine, Pharmacy Technician/Liaison, Discharge [facility]acy, [phone]

## 2020-02-18 NOTE — PROVIDER NOTIFICATION
Lab called with positive culture of R shoulder from 2/17.  Gram positive cocci.  Text page to Dr. De Los Santos.

## 2020-02-18 NOTE — CONSULTS
[facility]    Cardiology Consultation     Date of Admission:  2/17/2020    Assessment & Plan     1.  PAF, now converted to normal sinus rhythm  2.  Presentation of dehydration possible sepsis syndrome  3.  New diagnosis of hypertension  4.  New diagnosis of diabetes mellitus  5.  Acute renal insufficiency, improvement with hydration    Recommendations    1.  PAF: Patient has converted normal sinus rhythm spontaneously.  He has an elevated chads vascular 2 risk score based on available data.  He has no contraindications to long-term anticoagulation.  We will continue with heparin for the time being and reassess prior to his discharge.  We will look at his internist and asked patient whether he would like Coumadin or NOAC.  We will start him on low-dose beta-blocker and obtain an echocardiogram during this admission.  We will keep an eye on his hemoglobin.  Likely was hemoconcentrated upon presentation with a hemoglobin of 17 which is now 12.5 with vigorous IV hydration.  This will impact our decision regarding anticoagulation.    2.  Patient can follow-up with EP upon discharge    3.  Hypertension: We will restart lisinopril low-dose as well as metoprolol 25 mg twice daily    4.  Further recommendations after additional data is obtained      Lio Vergara MD      HPI:    Patient is a 64-year-old male with no known chronic medical issues.  He recently was diagnosed with hypertension diabetes mellitus in clinic on a routine wellness check.  He was then recommended to start medical therapy including metformin as well as lisinopril for blood pressure.  Notably he had a significant elevated blood glucose of 628 while in clinic.  He was subsequently transferred after the lab work was obtained.  He was assessed in the ER and based on lab work and symptoms felt to be dehydrated.  He was given IV fluid hydration vigorously and has improved lab work wise as well as clinically.  He states preceding his  symptoms he had noticed some excessive thirst and going to the bathroom more often than normal.  He has not had any significant cardiac complaints.  He reports of no chest pain, PND orthopnea or other cardiac related concerns.  EKG was performed as he was tachycardic and his pulse was irregular and this demonstrated atrial fibrillation.  Diltiazem was going to be initiated to help however he spontaneously converted on his own.  Given his atrial fibrillation cardiology service is subsequently consulted.  At present he states that he is feeling improved.         Primary Care Physician   [facility]      Patient Active Problem List   Diagnosis     Sepsis (H)       Past Medical History   I have reviewed this patient's medical history and updated it with pertinent information if needed.   Past Medical History:   Diagnosis Date     Hypertension        Past Surgical History   I have reviewed this patient's surgical history and updated it with pertinent information if needed.  History reviewed. No pertinent surgical history.    Prior to Admission Medications   Prior to Admission Medications   Prescriptions Last Dose Informant Patient Reported? Taking?   fish oil-omega-3 fatty acids 1000 MG capsule 2/17/2020 at Unknown time Self Yes Yes   Sig: Take 1 g by mouth daily   fluticasone (FLONASE) 50 MCG/ACT nasal spray 2/17/2020 at Unknown time Pharmacy Yes Yes   Sig: [place] 2 sprays into both nostrils daily   hydrochlorothiazide (HYDRODIURIL) 25 MG tablet 2/17/2020 at Unknown time Pharmacy Yes Yes   Sig: Take 25 mg by mouth daily   lisinopril (PRINIVIL/ZESTRIL) 20 MG tablet 2/17/2020 at Unknown time Pharmacy Yes Yes   Sig: Take 20 mg by mouth daily    metFORMIN (GLUCOPHAGE-XR) 500 MG 24 hr tablet 2/17/2020 at PM Pharmacy Yes Yes   Sig: Take 500 mg by mouth daily (with dinner)    naproxen sodium (ANAPROX) 220 MG tablet 2/17/2020 at Unknown time Self Yes Yes   Sig: Take 220 mg by mouth daily      Facility-Administered  "Medications: None     Current Facility-Administered Medications   Medication Dose Route Frequency     heparin ANTICOAGULANT  5,000 Units Subcutaneous Q12H     insulin aspart  1-7 Units Subcutaneous TID AC     insulin aspart  1-5 Units Subcutaneous At Bedtime     lisinopril  5 mg Oral Daily     metoprolol tartrate  25 mg Oral BID     piperacillin-tazobactam  3.375 g Intravenous Q6H     senna-docusate  1 tablet Oral BID    Or     senna-docusate  2 tablet Oral BID     sodium chloride (PF)  3 mL Intracatheter Q8H     vancomycin (VANCOCIN) IV  1,500 mg Intravenous Q12H     Current Facility-Administered Medications   Medication Last Rate     sodium chloride 100 mL/hr at 02/18/20 0731     Allergies   No Known Allergies    Social History    reports that he has been smoking cigarettes. He has been smoking about 0.25 packs per day. He has never used smokeless tobacco. He reports current alcohol use.    Family History   No family history on file.    Review of Systems   The comprehensive 10 point Review of Systems is negative other than noted in the HPI or here.     Physical Exam   Vital Signs with Ranges  Temp:  [96.1  F (35.6  C)-98.7  F (37.1  C)] 98.7  F (37.1  C)  Pulse:  [] 76  Heart Rate:  [] 72  Resp:  [8-28] 20  BP: ()/(54-92) 162/84  SpO2:  [87 %-100 %] 95 %  Wt Readings from Last 4 Encounters:   02/18/20 79.7 kg (175 lb 12.8 oz)   09/14/06 91.6 kg (202 lb)   07/14/06 86.2 kg (190 lb)     I/O last 3 completed shifts:  In: 2000 [IV Piggyback:2000]  Out: -       Vitals: BP (!) 162/84   Pulse 76   Temp 98.7  F (37.1  C) (Oral)   Resp 20   Ht 1.753 m (5' 9\")   Wt 79.7 kg (175 lb 12.8 oz)   SpO2 95%   BMI 25.96 kg/m      Constitutional:   awake, alert, cooperative, no apparent distress, and appears stated age     Neck:   Supple, symmetrical, trachea midline, no adenopathy, thyroid symmetric, not enlarged and no tenderness, skin normal     Back:   Symmetric, no curvature, spinous processes are " non-tender on palpation, paraspinous muscles are non-tender on palpation, no costal vertebral tenderness     Lungs:   No increased work of breathing, good air exchange, clear to auscultation bilaterally, no crackles or wheezing     Cardiovascular:   Normal apical impulse, regular rate and rhythm, normal S1 and S2, no S3 or S4, and no murmur noted     Abdomen:   No scars, normal bowel sounds, soft, non-distended, non-tender, no masses palpated, no hepatosplenomegally     Neurologic:   Awake, alert, oriented to name, place and time.  Cranial nerves II-XII are grossly intact.  Motor is 5 out of 5 bilaterally.  Cerebellar finger to nose, heel to shin intact.  Sensory is intact.  Babinski down going, Romberg negative, and gait is normal.       No lab results found in last 7 days.    Invalid input(s): TROPONINIES    Recent Labs   Lab 02/18/20 0526 02/17/20 2025 02/17/20 1541 02/13/20  0110   WBC 5.9  --  15.7* 12.9*   HGB 12.9*  --  17.0 17.0   MCV 85  --  84 81   * 173 208 248     --  127* 131*   POTASSIUM 3.6  --  3.7 3.0*   CHLORIDE 110*  --  94 94   CO2 26  --  21 26   BUN 23  --  28 20   CR 0.80  --  1.33* 0.68   GFRESTIMATED >90  --  56* >90   GFRESTBLACK >90  --  65 >90   ANIONGAP 4  --  12 11   SUZANNE 8.1*  --  9.8 9.8   *  --  396* 322*   ALBUMIN  --   --  3.9 4.6   PROTTOTAL  --   --  7.6 8.9*   BILITOTAL  --   --  0.9 0.4   ALKPHOS  --   --  83 122   ALT  --   --  13 17   AST  --   --  9 12     No results for input(s): CHOL, HDL, LDL, TRIG, CHOLHDLRATIO in the last 70198 hours.  Recent Labs   Lab 02/18/20 0526 02/17/20 2025 02/17/20  1541 02/13/20  0110   WBC 5.9  --  15.7* 12.9*   HGB 12.9*  --  17.0 17.0   HCT 37.0*  --  48.2 48.7   MCV 85  --  84 81   * 173 208 248     Recent Labs   Lab 02/17/20  1541 02/13/20  0113   PHV 7.35 7.38   PO2V 32 31   PCO2V 43 44   HCO3V 24 26     No results for input(s): NTBNPI, NTBNP in the last 168 hours.  No results for input(s): DD in the last  168 hours.  No results for input(s): SED, CRP in the last 168 hours.  Recent Labs   Lab 02/18/20  0526 02/17/20 2025 02/17/20  1541   * 173 208     No results for input(s): TSH in the last 168 hours.  Recent Labs   Lab 02/17/20  1804   COLOR Light Yellow   APPEARANCE Clear   URINEGLC 300*   URINEBILI Negative   URINEKETONE 5*   SG 1.005   UBLD Negative   URINEPH 5.0   PROTEIN Negative   NITRITE Negative   LEUKEST Negative   RBCU 0   WBCU 2       Imaging:  Recent Results (from the past 48 hour(s))   XR Chest 2 Views    Narrative    XR CHEST TWO VIEWS   2/17/2020 5:06 PM     HISTORY: Hypotension and tachycardia with shoulder pain, check for  pneumonia.    COMPARISON: None available      Impression    IMPRESSION: No acute airspace disease.    ZINA DALEY MD       Echo:  No results found for this or any previous visit (from the past 4320 hour(s)).

## 2020-02-18 NOTE — PLAN OF CARE
A&O X4. Tele: SR. HR 70s. On 2L NC to maintain sats > 90% while sleeping. Up assist of one with gait belt, gait unsteady at times. Voiding. Denies pain. Plan for cardiology consult today.

## 2020-02-18 NOTE — PROVIDER NOTIFICATION
MD Notification    Notified Person: MD    Notified Person Name: Rocio    Notification Date/Time: 02/18/20 4:09 PM    Notification Interaction: Webpage    Purpose of Notification: Speech unable to see patient. NPO for CT now, but please advise for diet after?    Orders Received:    Comments:

## 2020-02-18 NOTE — CONSULTS
[facility]    Stroke Consult Note    Reason for Consult:  RLE weakness    Chief Complaint: Hyperglycemia       HPI  Anson Moise is a 64 year old male with past medical history significant for HTN, DM2 and newly diagnosed atrial fibrillation (documented 2/17) who initially presented 2/17 for evaluation of elevated blood glucose. Stroke neurology was consulted for evaluation of progressively worsening right lower extremity weakness. He reports a history of lower back pain since Thanksgiving and outpatient MRI L spine demonstrated sunacute compression fracture of L1 with 25% loss of vertebral body height and he was scheduled to follow-up with orthopedic surgery. MRI brain this admission revealed areas of restricted diffusion in the right temporal lobe.    Stroke Evaluation summarized:  MRI/Head CT: Areas of restricted diffusion in the right temporal lobe  Intracranial Vascular Imaging: Awaiting  Cervical Carotid and Vertebral Artery Vascular Imaging: Awaiting  Echocardiogram: EF 60%, normal LAD, no evidence of shunt  EKG/Telemetry: atrial fibrillation with RVR on 2/17  LDL: Awaiting  A1c: 11.5  Troponin: Not tested  Other testing: Not Applicable    Impression  Ischemic Stroke due to cardioembolism - Newly diagnosed atrial fibrillation with RVR. Acute infarcts in this location do not explain RLE weakness.     Recommendations  Acute Ischemic Stroke (without tPA) Recommendations  - Neurochecks Q 4 hours  - Permissive HTN; labetalol PRN for SBP > 220  - Statin: pending LDL  - A1c 11.5, tighter long term glucose control needed to achieve goal <7.0  - Given embolic appearing stroke and new atrial fibrillation, would recommend anticoagulation for secondary stroke prevention. Patient is to be seen by spine surgery, so would recommend heparin drip for now as this can be stopped if procedure is planned. Long term will need warfarin vs DOAC.   - CTA head/neck   - Telemetry, EKG  - Bedside Glucose  "Monitoring  - Lipid Panel  - PT/OT/SLP  - Stroke Class per [facility] ([facility])  - Euthermia, Euglycemia    Patient Follow-up    - final recommendation pending work-up    Thank you for this consult. We will continue to follow.     BHARAT King, [facility]  Neurology  02/18/2020 4:45 PM  To page stroke neurology after hours or on a subsequent day, click here: AMCOM  Choose \"On Call\" tab at top, then search dropdown box for \"Neurology Adult\" & press Enter, look for Neuro ICU/Stroke  _____________________________________________________    Past Medical History   Past Medical History:   Diagnosis Date     Hypertension      Past Surgical History   History reviewed. No pertinent surgical history.  Medications   Home Meds  Prior to Admission medications    Medication Sig Start Date End Date Taking? Authorizing Provider   fish oil-omega-3 fatty acids 1000 MG capsule Take 1 g by mouth daily   Yes Unknown, Entered By History   fluticasone (FLONASE) 50 MCG/ACT nasal spray Spray 2 sprays into both nostrils daily   Yes Unknown, Entered By History   hydrochlorothiazide (HYDRODIURIL) 25 MG tablet Take 25 mg by mouth daily   Yes Unknown, Entered By History   lisinopril (PRINIVIL/ZESTRIL) 20 MG tablet Take 20 mg by mouth daily    Yes Reported, Patient   metFORMIN (GLUCOPHAGE-XR) 500 MG 24 hr tablet Take 500 mg by mouth daily (with dinner)  2/12/20  Yes Reported, Patient   naproxen sodium (ANAPROX) 220 MG tablet Take 220 mg by mouth daily   Yes Unknown, Entered By History       Scheduled Meds    heparin ANTICOAGULANT  5,000 Units Subcutaneous Q12H     insulin aspart  1-7 Units Subcutaneous TID AC     insulin aspart  1-5 Units Subcutaneous At Bedtime     insulin glargine  10 Units Subcutaneous QAM AC     lisinopril  5 mg Oral Daily     metoprolol tartrate  25 mg Oral BID     piperacillin-tazobactam  3.375 g Intravenous Q6H     senna-docusate  1 tablet Oral BID    Or     senna-docusate  2 tablet Oral BID     sodium chloride " (PF)  3 mL Intracatheter Q8H     vancomycin (VANCOCIN) IV  1,500 mg Intravenous Q12H       Infusion Meds    sodium chloride 100 mL/hr at 02/18/20 1253       PRN Meds  acetaminophen, glucose **OR** dextrose **OR** glucagon, lidocaine 4%, lidocaine (buffered or not buffered), melatonin, naloxone, nitroGLYcerin, ondansetron **OR** ondansetron, oxyCODONE-acetaminophen, sodium chloride (PF)    Allergies   No Known Allergies  Family History   No family history on file.  Social History   Social History     Tobacco Use     Smoking status: Current Every Day Smoker     Packs/day: 0.25     Types: Cigarettes     Smokeless tobacco: Never Used   Substance Use Topics     Alcohol use: Yes     Comment: few drinks a night     Drug use: None       Review of Systems   The 10 point Review of Systems is negative other than noted in the HPI or here.        PHYSICAL EXAMINATION   Temp:  [98.1  F (36.7  C)-99.3  F (37.4  C)] 98.6  F (37  C)  Pulse:  [71-99] 84  Heart Rate:  [] 92  Resp:  [9-28] 18  BP: (120-162)/(62-92) 138/62  SpO2:  [87 %-100 %] 95 %    Neurologic  Mental Status:  alert, oriented x 3, follows commands, speech clear and fluent, naming and repetition normal  Cranial Nerves:  visual fields intact, PERRL, EOMI with normal smooth pursuit, facial movements symmetric, hearing not formally tested but intact to conversation, no dysarthria, shoulder shrug strong bilaterally, tongue protrusion midline  Motor:  no abnormal movements, no pronator drift, upper extremity strength 5/5 bilaterally, LLE strength 5/5, RLE: unable to elevate, plantar flexion 5/5, dorsiflevxion 3/5, able to bend at knee  Reflexes:  not assessed  Sensory:  light touch sensation decreased in RLE, no extinction on double simultaneous stimulation   Coordination:  normal finger-to-nose and heel-to-shin bilaterally without dysmetria  Station/Gait:  deferred    Dysphagia Screen  Per Nursing    Stroke Scales    NIHSS  Interval     Interval Comments     1a.  Level of Consciousness 0-->Alert, keenly responsive   1b. LOC Questions 0-->Answers both questions correctly   1c. LOC Commands 0-->Performs both tasks correctly   2.   Best Gaze 0-->Normal   3.   Visual 0-->No visual loss   4.   Facial Palsy 0-->Normal symmetrical movements   5a. Motor Arm, Left 0-->No drift, limb holds 90 (or 45) degrees for full 10 secs   5b. Motor Arm, Right 0-->No drift, limb holds 90 (or 45) degrees for full 10 secs   6a. Motor Leg, Left 0-->No drift, leg holds 30 degree position for full 5 secs   6b. Motor Leg, right 3-->No effort against gravity, leg falls to bed immediately   7.   Limb Ataxia 0-->Absent   8.   Sensory 1-->Mild-to-moderate sensory loss, patient feels pinprick is less sharp or is dull on the affected side, or there is a loss of superficial pain with pinprick, but patient is aware of being touched   9.   Best Language 0-->No aphasia, normal   10. Dysarthria 0-->Normal   11. Extinction and Inattention  0-->No abnormality   Total 4 (02/18/20 1644)       Imaging  I personally reviewed all imaging; relevant findings per HPI.    Labs Data   CBC  Recent Labs   Lab 02/18/20  1435 02/18/20  0526 02/17/20 2025 02/17/20  1541 02/13/20  0110   WBC  --  5.9  --  15.7* 12.9*   RBC  --  4.38*  --  5.76 6.00*   HGB 11.9* 12.9*  --  17.0 17.0   HCT 34.8* 37.0*  --  48.2 48.7   PLT  --  146* 173 208 248     Basic Metabolic Panel   Recent Labs   Lab 02/18/20  0526 02/17/20  1541 02/13/20  0110    127* 131*   POTASSIUM 3.6 3.7 3.0*   CHLORIDE 110* 94 94   CO2 26 21 26   BUN 23 28 20   CR 0.80 1.33* 0.68   * 396* 322*   SUZANNE 8.1* 9.8 9.8     Liver Panel  Recent Labs   Lab Test 02/17/20  1541 02/13/20  0110   PROTTOTAL 7.6 8.9*   ALBUMIN 3.9 4.6   BILITOTAL 0.9 0.4   ALKPHOS 83 122   AST 9 12   ALT 13 17     INRNo lab results found.   Lipid ProfileNo lab results found.  A1C  Recent Labs   Lab Test 02/17/20 2025   A1C 11.5*     Troponin Kate results for input(s): TROPI in the last 168  hours.       Stroke Code / Stroke Consult Data Data This was a non-emergent, non-tele stroke consult.    I have personally spent a total of 50 minutes providing care and consulting with this patient's medical providers today, with more than 50% of this time spent in consultation, coordination of care, and discussion with the patient and/or family regarding diagnostic results, prognosis, symptom management, risks and benefits of management options, and development of plan of care.

## 2020-02-18 NOTE — PHARMACY-VANCOMYCIN DOSING SERVICE
Pharmacy Vancomycin Initial Note  Date of Service 2020  Patient's  1955  64 year old, male    Indication: Community Acquired Pneumonia    Current estimated CrCl = Estimated Creatinine Clearance: 62 mL/min (A) (based on SCr of 1.33 mg/dL (H)).    Creatinine for last 3 days  2020:  3:41 PM Creatinine 1.33 mg/dL    Recent Vancomycin Level(s) for last 3 days  No results found for requested labs within last 72 hours.      Vancomycin IV Administrations (past 72 hours)                   vancomycin 1500 mg in 0.9% NaCl 250 ml intermittent infusion 1,500 mg (mg) 1,500 mg Given 20 1812                Nephrotoxins and other renal medications (From now, onward)    Start     Dose/Rate Route Frequency Ordered Stop    20 0000  piperacillin-tazobactam (ZOSYN) 3.375 g vial to attach to  mL bag     Note to Pharmacy:  Pharmacy can adjust dose based on renal function.    3.375 g  over 1 Hours Intravenous EVERY 6 HOURS 20 1900      20 1752  vancomycin 1500 mg in 0.9% NaCl 250 ml intermittent infusion 1,500 mg      1,500 mg  over 90 Minutes Intravenous ONCE 20 1752            Contrast Orders - past 72 hours (72h ago, onward)    None                Plan:  1.  Vancomycin  1500 mg IV x1 given in [facility] ED.  Continue Vancomycin 1500 mg IV q12h.   2.  Goal Trough Level: 15-20 mg/L   3.  Pharmacy will check trough levels as appropriate in 1-3 Days.    4. Serum creatinine levels will be ordered daily for the first week of therapy and at least twice weekly for subsequent weeks.    5. [place] method utilized to dose vancomycin therapy: Method 1    Kulwinder Meyer, PharmD

## 2020-02-18 NOTE — PLAN OF CARE
VSS on RA.  Tele: SR. Up with 1 and belt.  R leg weakness.  NS at 100.  Metoprolol started for Afib.  Sub Q hep OKed by Dr. Chan before admission.  Hgb recheck at 1400.  Brain MRI today and spine consult.  Pt transferred to station 88.  Belongings sent with pt including glasses, phone and , clothes and shoes.

## 2020-02-18 NOTE — PHARMACY-ADMISSION MEDICATION HISTORY
Pharmacy Medication History  Admission medication history interview status for the 2/17/2020  admission is complete. See EPIC admission navigator for prior to admission medications     Medication history sources: Patient and Pharmacy ([facility] [address])  Medication history source reliability: Good  Adherence assessment: Good    Significant changes made to the medication list: Patient has completed 5 day Prednisone prescription.     Additional medication history information:  none    Medication reconciliation completed by provider prior to medication history? No    Time spent in this activity: 20 minutes      Prior to Admission medications    Medication Sig Last Dose Taking? Auth Provider   fish oil-omega-3 fatty acids 1000 MG capsule Take 1 g by mouth daily 2/17/2020 at Unknown time Yes Unknown, Entered By History   fluticasone (FLONASE) 50 MCG/ACT nasal spray Spray 2 sprays into both nostrils daily 2/17/2020 at Unknown time Yes Unknown, Entered By History   hydrochlorothiazide (HYDRODIURIL) 25 MG tablet Take 25 mg by mouth daily 2/17/2020 at Unknown time Yes Unknown, Entered By History   lisinopril (PRINIVIL/ZESTRIL) 20 MG tablet Take 20 mg by mouth daily  2/17/2020 at Unknown time Yes Reported, Patient   metFORMIN (GLUCOPHAGE-XR) 500 MG 24 hr tablet Take 500 mg by mouth daily (with dinner)  2/17/2020 at PM Yes Reported, Patient   naproxen sodium (ANAPROX) 220 MG tablet Take 220 mg by mouth daily 2/17/2020 at Unknown time Yes Unknown, Entered By History       Mera De La Cruz, NoelD, BCPS

## 2020-02-18 NOTE — PROVIDER NOTIFICATION
MD Notification    Notified Person: MD    Notified Person Name: Dr. Chan    Notification Date/Time: 2/18/2020 1323    Notification Interaction: Web page    Purpose of Notification: Please see MRI results, showing recent infarcts.   Thank you.     Orders Received: pending    Comments:

## 2020-02-19 ENCOUNTER — APPOINTMENT (OUTPATIENT)
Dept: SPEECH THERAPY | Facility: CLINIC | Age: 65
DRG: 064 | End: 2020-02-19
Attending: HOSPITALIST
Payer: COMMERCIAL

## 2020-02-19 ENCOUNTER — APPOINTMENT (OUTPATIENT)
Dept: OCCUPATIONAL THERAPY | Facility: CLINIC | Age: 65
DRG: 064 | End: 2020-02-19
Attending: HOSPITALIST
Payer: COMMERCIAL

## 2020-02-19 ENCOUNTER — APPOINTMENT (OUTPATIENT)
Dept: PHYSICAL THERAPY | Facility: CLINIC | Age: 65
DRG: 064 | End: 2020-02-19
Attending: HOSPITALIST
Payer: COMMERCIAL

## 2020-02-19 LAB
ANION GAP SERPL CALCULATED.3IONS-SCNC: 5 MMOL/L (ref 3–14)
BASOPHILS # BLD AUTO: 0 10E9/L (ref 0–0.2)
BASOPHILS NFR BLD AUTO: 0.3 %
BUN SERPL-MCNC: 11 MG/DL (ref 7–30)
CALCIUM SERPL-MCNC: 8.4 MG/DL (ref 8.5–10.1)
CHLORIDE SERPL-SCNC: 107 MMOL/L (ref 94–109)
CHOLEST SERPL-MCNC: 116 MG/DL
CO2 SERPL-SCNC: 26 MMOL/L (ref 20–32)
COLONOSCOPY: NORMAL
CREAT SERPL-MCNC: 0.71 MG/DL (ref 0.66–1.25)
DIFFERENTIAL METHOD BLD: ABNORMAL
EOSINOPHIL # BLD AUTO: 0.1 10E9/L (ref 0–0.7)
EOSINOPHIL NFR BLD AUTO: 1.4 %
ERYTHROCYTE [DISTWIDTH] IN BLOOD BY AUTOMATED COUNT: 11.5 % (ref 10–15)
GFR SERPL CREATININE-BSD FRML MDRD: >90 ML/MIN/{1.73_M2}
GLUCOSE BLDC GLUCOMTR-MCNC: 144 MG/DL (ref 70–99)
GLUCOSE BLDC GLUCOMTR-MCNC: 210 MG/DL (ref 70–99)
GLUCOSE BLDC GLUCOMTR-MCNC: 273 MG/DL (ref 70–99)
GLUCOSE BLDC GLUCOMTR-MCNC: 276 MG/DL (ref 70–99)
GLUCOSE SERPL-MCNC: 193 MG/DL (ref 70–99)
HCT VFR BLD AUTO: 36.9 % (ref 40–53)
HDLC SERPL-MCNC: 38 MG/DL
HGB BLD-MCNC: 12.5 G/DL (ref 13.3–17.7)
IMM GRANULOCYTES # BLD: 0 10E9/L (ref 0–0.4)
IMM GRANULOCYTES NFR BLD: 0.3 %
LACTATE BLD-SCNC: 1.2 MMOL/L (ref 0.7–2)
LDLC SERPL CALC-MCNC: 46 MG/DL
LMWH PPP CHRO-ACNC: 0.28 IU/ML
LYMPHOCYTES # BLD AUTO: 2.6 10E9/L (ref 0.8–5.3)
LYMPHOCYTES NFR BLD AUTO: 36.8 %
MCH RBC QN AUTO: 28.8 PG (ref 26.5–33)
MCHC RBC AUTO-ENTMCNC: 33.9 G/DL (ref 31.5–36.5)
MCV RBC AUTO: 85 FL (ref 78–100)
MONOCYTES # BLD AUTO: 0.8 10E9/L (ref 0–1.3)
MONOCYTES NFR BLD AUTO: 11 %
NEUTROPHILS # BLD AUTO: 3.5 10E9/L (ref 1.6–8.3)
NEUTROPHILS NFR BLD AUTO: 50.2 %
NONHDLC SERPL-MCNC: 78 MG/DL
NRBC # BLD AUTO: 0 10*3/UL
NRBC BLD AUTO-RTO: 0 /100
PLATELET # BLD AUTO: 144 10E9/L (ref 150–450)
POTASSIUM SERPL-SCNC: 3.3 MMOL/L (ref 3.4–5.3)
POTASSIUM SERPL-SCNC: 3.8 MMOL/L (ref 3.4–5.3)
RBC # BLD AUTO: 4.34 10E12/L (ref 4.4–5.9)
SODIUM SERPL-SCNC: 138 MMOL/L (ref 133–144)
TRIGL SERPL-MCNC: 160 MG/DL
UPPER GI ENDOSCOPY: NORMAL
VANCOMYCIN SERPL-MCNC: 15 MG/L
WBC # BLD AUTO: 7 10E9/L (ref 4–11)

## 2020-02-19 PROCEDURE — 12000000 ZZH R&B MED SURG/OB

## 2020-02-19 PROCEDURE — 92610 EVALUATE SWALLOWING FUNCTION: CPT | Mod: GN

## 2020-02-19 PROCEDURE — 97110 THERAPEUTIC EXERCISES: CPT | Mod: GP | Performed by: PHYSICAL THERAPIST

## 2020-02-19 PROCEDURE — 25000128 H RX IP 250 OP 636: Performed by: INTERNAL MEDICINE

## 2020-02-19 PROCEDURE — 80061 LIPID PANEL: CPT | Performed by: HOSPITALIST

## 2020-02-19 PROCEDURE — 97165 OT EVAL LOW COMPLEX 30 MIN: CPT | Mod: GO

## 2020-02-19 PROCEDURE — 43239 EGD BIOPSY SINGLE/MULTIPLE: CPT | Performed by: INTERNAL MEDICINE

## 2020-02-19 PROCEDURE — 85025 COMPLETE CBC W/AUTO DIFF WBC: CPT | Performed by: HOSPITALIST

## 2020-02-19 PROCEDURE — 97161 PT EVAL LOW COMPLEX 20 MIN: CPT | Mod: GP | Performed by: PHYSICAL THERAPIST

## 2020-02-19 PROCEDURE — 25000132 ZZH RX MED GY IP 250 OP 250 PS 637: Performed by: HOSPITALIST

## 2020-02-19 PROCEDURE — 83605 ASSAY OF LACTIC ACID: CPT | Performed by: HOSPITALIST

## 2020-02-19 PROCEDURE — 80048 BASIC METABOLIC PNL TOTAL CA: CPT | Performed by: HOSPITALIST

## 2020-02-19 PROCEDURE — 25000132 ZZH RX MED GY IP 250 OP 250 PS 637: Performed by: INTERNAL MEDICINE

## 2020-02-19 PROCEDURE — 88305 TISSUE EXAM BY PATHOLOGIST: CPT | Performed by: INTERNAL MEDICINE

## 2020-02-19 PROCEDURE — 25000128 H RX IP 250 OP 636

## 2020-02-19 PROCEDURE — 99207 ZZC APP CREDIT; MD BILLING SHARED VISIT: CPT | Performed by: PSYCHIATRY & NEUROLOGY

## 2020-02-19 PROCEDURE — 00000146 ZZHCL STATISTIC GLUCOSE BY METER IP

## 2020-02-19 PROCEDURE — 0DB98ZX EXCISION OF DUODENUM, VIA NATURAL OR ARTIFICIAL OPENING ENDOSCOPIC, DIAGNOSTIC: ICD-10-PCS | Performed by: INTERNAL MEDICINE

## 2020-02-19 PROCEDURE — 97535 SELF CARE MNGMENT TRAINING: CPT | Mod: GO

## 2020-02-19 PROCEDURE — 99231 SBSQ HOSP IP/OBS SF/LOW 25: CPT | Performed by: INTERNAL MEDICINE

## 2020-02-19 PROCEDURE — 88305 TISSUE EXAM BY PATHOLOGIST: CPT | Mod: 26 | Performed by: INTERNAL MEDICINE

## 2020-02-19 PROCEDURE — 99233 SBSQ HOSP IP/OBS HIGH 50: CPT | Performed by: HOSPITALIST

## 2020-02-19 PROCEDURE — 0DBN8ZZ EXCISION OF SIGMOID COLON, VIA NATURAL OR ARTIFICIAL OPENING ENDOSCOPIC: ICD-10-PCS | Performed by: INTERNAL MEDICINE

## 2020-02-19 PROCEDURE — 97116 GAIT TRAINING THERAPY: CPT | Mod: GP | Performed by: PHYSICAL THERAPIST

## 2020-02-19 PROCEDURE — 25000125 ZZHC RX 250: Performed by: INTERNAL MEDICINE

## 2020-02-19 PROCEDURE — 0DB68ZX EXCISION OF STOMACH, VIA NATURAL OR ARTIFICIAL OPENING ENDOSCOPIC, DIAGNOSTIC: ICD-10-PCS | Performed by: INTERNAL MEDICINE

## 2020-02-19 PROCEDURE — 25000128 H RX IP 250 OP 636: Performed by: HOSPITALIST

## 2020-02-19 PROCEDURE — 25000131 ZZH RX MED GY IP 250 OP 636 PS 637: Performed by: HOSPITALIST

## 2020-02-19 PROCEDURE — 85520 HEPARIN ASSAY: CPT | Performed by: HOSPITALIST

## 2020-02-19 PROCEDURE — 84132 ASSAY OF SERUM POTASSIUM: CPT | Performed by: HOSPITALIST

## 2020-02-19 PROCEDURE — 36415 COLL VENOUS BLD VENIPUNCTURE: CPT | Performed by: HOSPITALIST

## 2020-02-19 PROCEDURE — 99232 SBSQ HOSP IP/OBS MODERATE 35: CPT | Performed by: PSYCHIATRY & NEUROLOGY

## 2020-02-19 PROCEDURE — 80202 ASSAY OF VANCOMYCIN: CPT | Performed by: HOSPITALIST

## 2020-02-19 PROCEDURE — G0500 MOD SEDAT ENDO SERVICE >5YRS: HCPCS | Performed by: INTERNAL MEDICINE

## 2020-02-19 PROCEDURE — 45380 COLONOSCOPY AND BIOPSY: CPT | Performed by: INTERNAL MEDICINE

## 2020-02-19 RX ORDER — POTASSIUM CHLORIDE 29.8 MG/ML
20 INJECTION INTRAVENOUS
Status: DISCONTINUED | OUTPATIENT
Start: 2020-02-19 | End: 2020-02-19 | Stop reason: CLARIF

## 2020-02-19 RX ORDER — POTASSIUM CHLORIDE 1500 MG/1
20-40 TABLET, EXTENDED RELEASE ORAL
Status: DISCONTINUED | OUTPATIENT
Start: 2020-02-19 | End: 2020-02-24 | Stop reason: HOSPADM

## 2020-02-19 RX ORDER — LIDOCAINE 40 MG/G
CREAM TOPICAL
Status: DISCONTINUED | OUTPATIENT
Start: 2020-02-19 | End: 2020-02-19 | Stop reason: HOSPADM

## 2020-02-19 RX ORDER — FENTANYL CITRATE 50 UG/ML
INJECTION, SOLUTION INTRAMUSCULAR; INTRAVENOUS PRN
Status: DISCONTINUED | OUTPATIENT
Start: 2020-02-19 | End: 2020-02-19 | Stop reason: HOSPADM

## 2020-02-19 RX ORDER — HEPARIN SODIUM 10000 [USP'U]/100ML
900 INJECTION, SOLUTION INTRAVENOUS CONTINUOUS
Status: DISCONTINUED | OUTPATIENT
Start: 2020-02-19 | End: 2020-02-21

## 2020-02-19 RX ORDER — POTASSIUM CHLORIDE 7.45 MG/ML
10 INJECTION INTRAVENOUS
Status: DISCONTINUED | OUTPATIENT
Start: 2020-02-19 | End: 2020-02-24 | Stop reason: HOSPADM

## 2020-02-19 RX ORDER — POTASSIUM CHLORIDE 1.5 G/1.58G
20-40 POWDER, FOR SOLUTION ORAL
Status: DISCONTINUED | OUTPATIENT
Start: 2020-02-19 | End: 2020-02-24 | Stop reason: HOSPADM

## 2020-02-19 RX ORDER — PANTOPRAZOLE SODIUM 40 MG/1
40 TABLET, DELAYED RELEASE ORAL
Status: DISCONTINUED | OUTPATIENT
Start: 2020-02-19 | End: 2020-02-24 | Stop reason: HOSPADM

## 2020-02-19 RX ORDER — FENOFIBRATE 54 MG/1
54 TABLET ORAL DAILY
Status: DISCONTINUED | OUTPATIENT
Start: 2020-02-19 | End: 2020-02-24

## 2020-02-19 RX ORDER — FLUTICASONE PROPIONATE 50 MCG
2 SPRAY, SUSPENSION (ML) NASAL DAILY
Status: DISCONTINUED | OUTPATIENT
Start: 2020-02-19 | End: 2020-02-24 | Stop reason: HOSPADM

## 2020-02-19 RX ORDER — POTASSIUM CL/LIDO/0.9 % NACL 10MEQ/0.1L
10 INTRAVENOUS SOLUTION, PIGGYBACK (ML) INTRAVENOUS
Status: DISCONTINUED | OUTPATIENT
Start: 2020-02-19 | End: 2020-02-24 | Stop reason: HOSPADM

## 2020-02-19 RX ADMIN — HEPARIN SODIUM 900 UNITS/HR: 10000 INJECTION, SOLUTION INTRAVENOUS at 12:37

## 2020-02-19 RX ADMIN — INSULIN ASPART 1 UNITS: 100 INJECTION, SOLUTION INTRAVENOUS; SUBCUTANEOUS at 13:56

## 2020-02-19 RX ADMIN — LISINOPRIL 5 MG: 5 TABLET ORAL at 08:51

## 2020-02-19 RX ADMIN — METOPROLOL TARTRATE 25 MG: 25 TABLET ORAL at 21:06

## 2020-02-19 RX ADMIN — INSULIN GLARGINE 15 UNITS: 100 INJECTION, SOLUTION SUBCUTANEOUS at 13:58

## 2020-02-19 RX ADMIN — PIPERACILLIN AND TAZOBACTAM 3.38 G: 3; .375 INJECTION, POWDER, FOR SOLUTION INTRAVENOUS at 05:39

## 2020-02-19 RX ADMIN — POTASSIUM CHLORIDE 20 MEQ: 1500 TABLET, EXTENDED RELEASE ORAL at 16:36

## 2020-02-19 RX ADMIN — PIPERACILLIN AND TAZOBACTAM 3.38 G: 3; .375 INJECTION, POWDER, FOR SOLUTION INTRAVENOUS at 00:50

## 2020-02-19 RX ADMIN — POTASSIUM CHLORIDE 40 MEQ: 1500 TABLET, EXTENDED RELEASE ORAL at 13:53

## 2020-02-19 RX ADMIN — FLUTICASONE PROPIONATE 2 SPRAY: 50 SPRAY, METERED NASAL at 16:34

## 2020-02-19 RX ADMIN — PANTOPRAZOLE SODIUM 40 MG: 40 TABLET, DELAYED RELEASE ORAL at 16:35

## 2020-02-19 RX ADMIN — INSULIN ASPART 3 UNITS: 100 INJECTION, SOLUTION INTRAVENOUS; SUBCUTANEOUS at 18:35

## 2020-02-19 RX ADMIN — METOPROLOL TARTRATE 25 MG: 25 TABLET ORAL at 08:51

## 2020-02-19 RX ADMIN — FENOFIBRATE 54 MG: 54 TABLET ORAL at 18:34

## 2020-02-19 ASSESSMENT — ACTIVITIES OF DAILY LIVING (ADL)
ADLS_ACUITY_SCORE: 15
PREVIOUS_RESPONSIBILITIES: MEAL PREP;LAUNDRY;HOUSEKEEPING;SHOPPING;YARDWORK;MEDICATION MANAGEMENT;FINANCES;DRIVING
ADLS_ACUITY_SCORE: 15
ADLS_ACUITY_SCORE: 12
ADLS_ACUITY_SCORE: 14
ADLS_ACUITY_SCORE: 12
ADLS_ACUITY_SCORE: 15

## 2020-02-19 NOTE — PLAN OF CARE
DATE & TIME: 2/19/2020       [id number]           Cognitive Concerns/ Orientation : Alert and Oriented x4   BEHAVIOR & AGGRESSION TOOL COLOR: Green   CIWA SCORE: NA    ABNL VS/O2: VSS on RA  MOBILITY: SBA, some weakness in RLE since brain infarct.  PAIN MANAGMENT: Denies   DIET: regular, tolerating  BOWEL/BLADDER: Continent B/B  ABNL LAB/BG: K+ 3.3, protocol initiated and followed- recheck at 830 pm tonight.  at lunch. On lantus and sliding scale/carb count  DRAIN/DEVICES: PIV SL x3, IVF infusing in two on L arm.   TELEMETRY RHYTHM: NSR   SKIN: Intact   TESTS/PROCEDURES: Colonoscopy &EGD completed, lower scope negative, upper scope showed some gastric erosions but no bleeding .  EMG ordered to be completed today or tomorrow  D/C DAY/GOALS/PLACE: 1-2 days pending anticoagulation  OTHER IMPORTANT INFO: started on heparin gtt for new intermittent a fib and brain infarct.

## 2020-02-19 NOTE — PROGRESS NOTES
[facility]  Medicine Progress Note - Hospitalist Service       Date of Admission:  2/17/2020        Interval History   No acute issues, Rt leg weakness unchanged, remains on tele-sinus. No chest pain or dyspnea.     Assessment & Plan   Anson Moise is a 64 years old male with PMH of HTN, ongoing RLE weakness and back pain and recently diagnosed DM2 presented to ER with generalized weakness. Noted to have severe dehydration with SANTHOSH, pseudohyponatremia, lactic acidosis and a new onset of A. fib RVR.  Admitted as IMC on 2/17/2020 for further management.     SIRS: Tachycardic, marked leukocytosis, lactic acidosis and hypotensive to 80s (but while in A fib RVR) without clear source of infection. CXR negative, UA unremarkable, ongoing back pain but has improved, no diarrhea or abdominal pain.  Afebrile.  Presumed SIRS due to severe dehydration due to osmotic diuresis from severe hyperglycemia.   -Blood cultures sent from ER, started on vancomycin and Zosyn on admission, cultures are negative so far, and no concern for infection and so will discontinue IV abx, and monitor.    -Lactic acidosis and leukocytosis resolved with IV hydration. BP stable.  -Orthospine eval given right leg weakness and back pain in the setting of SIRS, see below.     A. fib with RVR  HTN  Patient had an episode of A. fib RVR but then he converted to sinus with IV fluid resuscitation.  - TSH normal  - ECHO normal LVEF, no WMA.   - Cardiology consulted, started on metoprolol, lisinopril resumed. Recommended anticoagulation given [id number] score of 5 (age, HTN, DM2, stroke). Converted and currently sinus and rate controlled.  - Heparin drip currently, if no any interventions needed from spine as well, will transition to warfarin/apixaban. Pharmacy liasion consult for price on apixaban.      Acute kidney injury: Cr at presentation 1.33, normal baseline.  Patient was recently diagnosed with DM 2, was restarted on metformin.  Had  blood sugar of up to 600 at the time of diagnosis.  Suspect ongoing hyperosmotic diuresis due to uncontrolled blood sugar, prerenal state and dehydration causing SANTHOSH.  -Cr normal with IV hydration  -UA without proteinuria     Hypokalemia  Pseudo-hyponatremia: Sodium 127 at admission expected, corrected 134 for blood sugar of 396.   -Normal with IV hydration.  -Replace K per protocol     DM, type 2: Blood sugar was noted to be more than 600 on routine labs as outpatient 5 days PTA.  Was started on metformin.  HbA1c is 11.5.   -Even severely elevated A1c, he will likely needs to be discharged on insulin with supplies.   -Increase Lantus to 18 units every morning, and continue aspart per carb counting. ISS.   -Plan to resume low-dose metformin as well at discharge since renal function is normal now.     Chronic back pain  RLE weakness  Ongoing low back pain since around Thanksgiving.  Though back pain is improved, progressive right lower extremity noted.  Patient has fallen without any injuries since his right knee joel. Was evaluated by PCP and had MRI of the lumbar spine as outpatient 2/14. Shows mild anterior wedging subacute compression fracture of L1 with 25% loss of vertebral body height.  Minimal retropulsion of the posterior superior corner. Severity of leg weakness not explained by above lumbar spinal MRI findings, and also not by stroke findings on MRI brain.  -Orthospine consulted-pending eval, possible needs to scan rest of the spines, cervical and thoracic as well given his presentation with SIRS since the outpatient MRI does not explain his findings of RLE weakness.  -general neurology consulted as his leg weakness was not explained by the MRI findings (brain/lumbar), plan is EMG tomorrow, suspected neuropathy related to DM.   -see stroke below    Ischemic stroke  -given a fib with RVR and Rt leg weakness, MRI brain was done. Showed recent right temporal lobe infarct. CTA head and neck done, showed no  vascular stenosis or occlusions but Small saccular aneurysm projecting inferiorly off the left carotid siphon, 2.5 mm maximum diameter.  -Neurology consulted, scans as above. A1C as above. LDL at goal -46. PT OT SLP kaci completed. TCU at discharge. ECHO showed normal LVEF and no flow across septum based on doppler. Start fibrate given elevated TG.     Anemia, acute, mild.   Erosive gastritis.   Presented with hemoglobin of 17, noted same Hb level as outpatient last month.   -Noted drop in Hb down to 12 overnight after admission.  Patient does take low-dose ibuprofen daily but no hematochezia or melena. Was due for colonoscopy as outpatient, has never had one done prior.  - B12 folate normal stool for guaiac negative. No iron deficiency but given drop without other source, and needing AC, GI consulted, s/p EGD and colonoscopy this am, erosive gastropathy on EGD, 3 mm sigmoid rectal polyp on colonoscopy, resected, biopsy to follow.  - OK to continue AC with PPI BID.      Diet: Regular Diet Adult    DVT Prophylaxis: Heparin SQ and Pneumatic Compression Devices  Chilel Catheter: not present  Code Status: Full Code      Disposition Plan   Expected discharge: 2+ days, recommended to prior living arrangement once pending clinical course.  Entered: Staci Chan MD 02/19/2020, 4:10 PM     The patient's care was discussed with the Bedside Nurse and Patient.    Staci Chan MD  Hospitalist Service  [facility]    ______________________________________________________________________    Data reviewed today: I reviewed all medications, new labs and imaging results over the last 24 hours. I personally reviewed the EKG tracing showing A. fib, RVR in 150s on admission, currently sinus rhythm, HR in the 80s.    Physical Exam   Vital Signs: Temp: 94.7  F (34.8  C) Temp src: Oral BP: (!) 141/77 Pulse: 78 Heart Rate: 80 Resp: 22 SpO2: 95 % O2 Device: None (Room air)    Weight: 175 lbs 12.8 oz    General:  AAOx3, appears comfortable.  HEENT: PERRLA EOMI. Mucosa moist.   Lungs: Bilateral equal air entry. Clear to auscultation, normal work of breathing.   CVS: S1S2 regular, no tachycardia or murmur.   Abdomen: Soft, NT, ND. BS heard.  MSK: No edema or deformities.  Neuro: AAOX3. CN 2-12 normal.  Right lower extremity weakness especially on hip flexion and knee flexion of ankle which is 3/5.  Plantar flexion is normal.  Babinski's-withdrawal bilaterally.  Sensation intact.  Skin: No rash.     Data   Recent Labs   Lab 02/19/20  0455 02/18/20  2226 02/18/20  1435 02/18/20  0526 02/17/20  2025 02/17/20  1541 02/13/20  0110   WBC 7.0  --   --  5.9  --  15.7* 12.9*   HGB 12.5* 12.5* 11.9* 12.9*  --  17.0 17.0   MCV 85  --   --  85  --  84 81   *  --   --  146* 173 208 248     --   --  140  --  127* 131*   POTASSIUM 3.3*  --   --  3.6  --  3.7 3.0*   CHLORIDE 107  --   --  110*  --  94 94   CO2 26  --   --  26  --  21 26   BUN 11  --   --  23  --  28 20   CR 0.71  --   --  0.80  --  1.33* 0.68   ANIONGAP 5  --   --  4  --  12 11   SUZANNE 8.4*  --   --  8.1*  --  9.8 9.8   *  --   --  188*  --  396* 322*   ALBUMIN  --   --   --   --   --  3.9 4.6   PROTTOTAL  --   --   --   --   --  7.6 8.9*   BILITOTAL  --   --   --   --   --  0.9 0.4   ALKPHOS  --   --   --   --   --  83 122   ALT  --   --   --   --   --  13 17   AST  --   --   --   --   --  9 12     Recent Results (from the past 24 hour(s))   CTA Head Neck with Contrast    Narrative    CT ANGIOGRAM OF THE HEAD AND NECK WITHOUT AND WITH CONTRAST  2/18/2020  8:44 PM     HISTORY: Right temporal infarct on MRI.    TECHNIQUE:  Precontrast localizing scans were followed by CT  angiography with an injection of 70mL Isovue-370 IV with scans through  the head and neck.  3D post processing was performed, images were  archived to PACS and used in interpretation of this study.  Estimates  of carotid stenoses are made relative to the distal internal carotid  artery  diameters except as noted. Radiation dose for this scan was  reduced using automated exposure control, adjustment of the mA and/or  kV according to patient size, or iterative reconstruction technique.    COMPARISON: MRI 2/18/2020    CT HEAD FINDINGS:  No contrast enhancing lesions.   Cerebral blood  flow is grossly normal.    CT ANGIOGRAM HEAD FINDINGS: Calcified atherosclerotic plaque is seen  in the carotid siphons. This causes at most mild stenosis. There is  fetal origin of the right posterior cerebral artery from the internal  carotid, a normal variant. Arteries are widely patent. There is a  small saccular aneurysm projecting inferiorly off the distal left  carotid siphon about 2.5 mm maximum diameter. No other aneurysm.   Venous circulation is unremarkable.     CT ANGIOGRAM NECK FINDINGS:   Right carotid artery: Calcified plaque in the bifurcation.  No  significant stenosis.      Left carotid artery: Calcified plaque in the bifurcation.  No  significant stenosis.      Vertebral arteries:   No significant stenosis.      Other findings: None.      Impression    IMPRESSION:   1. No evidence of arterial occlusion or significant stenosis to  account for the infarct.  2. Small saccular aneurysm projecting inferiorly off the left carotid  siphon, 2.5 mm maximum diameter.  3. Calcified atherosclerotic plaque in the carotid bifurcations and  siphons without significant stenosis.    ALBERTO LUCAS MD     Medications     HEParin 900 Units/hr (02/19/20 1237)       fenofibrate  54 mg Oral Daily     fluticasone  2 spray Both Nostrils Daily     insulin aspart   Subcutaneous TID AC     insulin aspart  1-7 Units Subcutaneous TID AC     insulin aspart  1-5 Units Subcutaneous At Bedtime     [START ON 2/20/2020] insulin glargine  18 Units Subcutaneous QAM AC     lisinopril  5 mg Oral Daily     metoprolol tartrate  25 mg Oral BID     pantoprazole  40 mg Oral BID AC     senna-docusate  1 tablet Oral BID    Or     senna-docusate  2  tablet Oral BID     sodium chloride (PF)  3 mL Intracatheter Q8H

## 2020-02-19 NOTE — PROGRESS NOTES
"  [facility]    Stroke Progress Note    Interval Events  No acute events overnight. Denies new neurologic symptoms.     Stroke Evaluation summarized:  MRI/Head CT: Areas of restricted diffusion in the right temporal lobe  Intracranial Vascular Imaging: Awaiting  Cervical Carotid and Vertebral Artery Vascular Imaging: Awaiting  Echocardiogram: EF 60%, normal LAD, no evidence of shunt  EKG/Telemetry: atrial fibrillation with RVR on 2/17  LDL: 46  A1c: 11.5  Troponin: Not tested  Other testing: Not Applicable    Impression  Ischemic Stroke due to cardioembolism - Newly diagnosed atrial fibrillation with RVR. Acute infarcts in this location do not explain RLE weakness.     Plan  - Goal BP <140/90 with tighter control associated with decreased overall CV risk, if tolerated  - LDL 46, within goal 40-70. LDL <40 associated with increased risk of ICH.  - A1c 11.5, tighter control needed to achieve goal <7.0  - Discussed the importance of smoking cessation for secondary stroke prevention  - Continue heparin drip for now due to possible procedure. Will need long term PO anticoagulation, Eliquis preferred from neuro perspective but understand that this can be cost prohibitive.   - Incidental aneurysm found on CTA, can be followed-up as outpatient    Follow-Up:  - 1-2 weeks with PCP   - 4-6 weeks with outpatient neurology    Thank you for this consult. No further stroke evaluation is indicated, we will sign off. Please contact us with additional questions.     BHARAT King, [facility]  Neurology  02/19/2020 5:44 PM  To page stroke neurology after hours or on a subsequent day, click here: AMCOM  Choose \"On Call\" tab at top, then search dropdown box for \"Neurology Adult\" & press Enter, look for Neuro ICU/Stroke       ______________________________________________________    Medications   Home Meds  Prior to Admission medications    Medication Sig Start Date End Date Taking? Authorizing Provider   fish " oil-omega-3 fatty acids 1000 MG capsule Take 1 g by mouth daily   Yes Unknown, Entered By History   fluticasone (FLONASE) 50 MCG/ACT nasal spray Spray 2 sprays into both nostrils daily   Yes Unknown, Entered By History   hydrochlorothiazide (HYDRODIURIL) 25 MG tablet Take 25 mg by mouth daily   Yes Unknown, Entered By History   lisinopril (PRINIVIL/ZESTRIL) 20 MG tablet Take 20 mg by mouth daily    Yes Reported, Patient   metFORMIN (GLUCOPHAGE-XR) 500 MG 24 hr tablet Take 500 mg by mouth daily (with dinner)  2/12/20  Yes Reported, Patient   naproxen sodium (ANAPROX) 220 MG tablet Take 220 mg by mouth daily   Yes Unknown, Entered By History       Scheduled Meds    fenofibrate  54 mg Oral Daily     fluticasone  2 spray Both Nostrils Daily     insulin aspart   Subcutaneous TID AC     insulin aspart  1-7 Units Subcutaneous TID AC     insulin aspart  1-5 Units Subcutaneous At Bedtime     [START ON 2/20/2020] insulin glargine  18 Units Subcutaneous QAM AC     lisinopril  5 mg Oral Daily     metoprolol tartrate  25 mg Oral BID     pantoprazole  40 mg Oral BID AC     senna-docusate  1 tablet Oral BID    Or     senna-docusate  2 tablet Oral BID     sodium chloride (PF)  3 mL Intracatheter Q8H       Infusion Meds    HEParin 900 Units/hr (02/19/20 1237)       PRN Meds  acetaminophen, glucose **OR** dextrose **OR** glucagon, lidocaine 4%, lidocaine (buffered or not buffered), melatonin, naloxone, nitroGLYcerin, ondansetron **OR** ondansetron, oxyCODONE-acetaminophen, potassium chloride, potassium chloride with lidocaine, potassium chloride, potassium chloride, sodium chloride (PF)       PHYSICAL EXAMINATION  Temp:  [94.7  F (34.8  C)-98.4  F (36.9  C)] 98.4  F (36.9  C)  Pulse:  [64-78] 72  Heart Rate:  [62-89] 78  Resp:  [9-22] 20  BP: (111-183)/() 159/78  SpO2:  [95 %-100 %] 100 %     Neurologic  Mental Status:  alert, oriented x 3, follows commands, speech clear and fluent, naming and repetition normal  Cranial Nerves:   PERRL, EOMI with normal smooth pursuit, facial movements symmetric, hearing not formally tested but intact to conversation, no dysarthria, tongue protrusion midline  Motor:  no abnormal movements, no pronator drift, upper extremity strength 5/5 bilaterally, LLE strength 5/5, RLE: knee flexion/extension 4/5, plantar flexion 5/5, dorsiflexion 4/5  Reflexes:  not assessed  Sensory:  light touch sensation intact and symmetric throughout upper and lower extremities, no extinction on double simultaneous stimulation   Coordination:  no obvious ataxia  Station/Gait:  deferred       Imaging  I personally reviewed all imaging; relevant findings per HPI.     Lab Results Data   CBC  Recent Labs   Lab 02/19/20  0455 02/18/20  2226 02/18/20  1435 02/18/20  0526 02/17/20 2025 02/17/20  1541   WBC 7.0  --   --  5.9  --  15.7*   RBC 4.34*  --   --  4.38*  --  5.76   HGB 12.5* 12.5* 11.9* 12.9*  --  17.0   HCT 36.9* 36.4* 34.8* 37.0*  --  48.2   *  --   --  146* 173 208     Basic Metabolic Panel    Recent Labs   Lab 02/19/20  0455 02/18/20  0526 02/17/20  1541    140 127*   POTASSIUM 3.3* 3.6 3.7   CHLORIDE 107 110* 94   CO2 26 26 21   BUN 11 23 28   CR 0.71 0.80 1.33*   * 188* 396*   SUZANNE 8.4* 8.1* 9.8     Liver Panel  Recent Labs   Lab Test 02/17/20  1541 02/13/20  0110   PROTTOTAL 7.6 8.9*   ALBUMIN 3.9 4.6   BILITOTAL 0.9 0.4   ALKPHOS 83 122   AST 9 12   ALT 13 17     INRNo lab results found.   Lipid Profile  Recent Labs   Lab Test 02/19/20  0455   CHOL 116   HDL 38*   LDL 46   TRIG 160*     A1C  Recent Labs   Lab Test 02/17/20 2025   A1C 11.5*     Troponin Kate results for input(s): TROPI in the last 168 hours.

## 2020-02-19 NOTE — PLAN OF CARE
OT- Evaluation and treatment initiated. Pt lives independently in a home. Prior pt independent in all I/ADLs.   Discharge Planner OT   Patient plan for discharge: Home  Current status: Pt ambulated to the bathroom with CGA and cane. Pt unsteady with functional mobility. Pt transferred to/from the toilet with SBA. While seated/standing pt completed LE dressing with CGA.  Completed the SLUMS Cognitive Screen to assess cognitive skills and the implications on I/ADLs. Pt scored 20/30 indicating cognitive impairment. Pt demonstrated difficulty with delayed recall, executive function, and attention. Further cognitive testing warranted.   Barriers to return to prior living situation: Current level of A for I/ADLs; lives alone; cognition   Recommendations for discharge: At this time recommend TCU    Rationale for recommendations: Pt lives alone and is below baseline in I/ADLs and functional mobility. Anticipate pt will progress in established IP OT goal areas with continued therapy. If pt returns home, then increased assist in I/ADLs (home management tasks, transportation) and home OT.        Entered by: Adebayo Almanza 02/19/2020 4:01 PM

## 2020-02-19 NOTE — CONSULTS
[facility] [place]  [facility]    Neurology Consultation    Anson Moise MRN# [id number]   YOB: 1955 Age: 64 year old    Code Status:Full Code   Date of Admission: 2/17/2020  Date of Consult:02/19/2020                                                                                       Assessment and Plan:                                         #.  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  --We will order EMG of the right lower extremity as well as left lower extremity/right upper extremity in the evaluation.  Patient asked about MRI of the pelvis, this would be helpful testing to rule out any other potential causes.  Assuming that this is from diabetic amyotrophy, recovery is slow taking weeks to months with continued improvement even up to 18 to 24 months.  For this reason rehabilitation is crucial particularly if there is any bracing or gait of assistive device to help with balance and to minimize fall risk.  Continue to optimize diabetes.    #.  Recent stroke related to atrial fibrillation  -- recommendations per stroke service/cardiology service regarding anticoagulation    #. DVT Prophylaxis  --Mechanical per hospitalist service  #. PT/OT/Speech  --Continue evaluations  #. Nutrition / GI Prophylaxis  --Per recommendations of speech therapy    Plan discussed with patient and rehabilitation staff and Dr. Chan      ----------------------------------------------------------------------------------  ----------------------------------------------------------------------------------  Reason for consult: I was asked by Dr. Chan to evaluate this patient for right leg weakness.       Chief Complaint:   Multiple conditions, right leg weakness, walking difficulty  History is obtained from the patient / chart        History of Present Illness:   This patient is a 64 year old male who presents with difficulty with leg weakness.  He reports that he had noted some changes in his balance and walking over the last year.  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  In November 2019, he noted more problems with his balance and use of his right leg as he had difficulty going up a ladder to hang [employer] decorations.  He also at that time noted that he started to have to physically lift his right leg to get in and out of a vehicle.  He also at about that time started to notice sensory symptoms involving tingling and numbness in his lower extremities.  He developed back pain in December 2019 and eventually saw a chiropractor who performed an adjustment and reduced his back pain.  However, in January he decided to stop working his job as he found his legs became very uncomfortable and painful and had balance difficulty when he attempted to walk at work.  He has had a couple of falls in the last month.  He started to seek medical care in the last month and was found to have elevated blood sugars.  Diabetes was a new diagnosis for him.  He was admitted for evaluation of sepsis.  As part of his work-up, he was seen by the stroke service.  An MRI scan did reveal right hemisphere stroke attributed to atrial fibrillation.  Further evaluation with general neurology was requested because of the right leg weakness and concerns for neuropathy.         Past Medical History:     Past Medical History:   Diagnosis Date     Cerebral infarction (H)      Diabetes (H)      Hypertension    Atrial fibrillation, recent sepsis, electrolyte disturbance, kidney dysfunction, back pain, stroke, anemia, possible GI bleeding      Past Surgical History:   History reviewed. No pertinent surgical history.       Social History:     Social History     Socioeconomic  History     Marital status:      Spouse name: None     Number of children: None     Years of education: None     Highest education level: None   Occupational History     None   Social Needs     Financial resource strain: None     Food insecurity:     Worry: None     Inability: None     Transportation needs:     Medical: None     Non-medical: None   Tobacco Use     Smoking status: Current Every Day Smoker     Packs/day: 0.25     Types: Cigarettes     Smokeless tobacco: Never Used   Substance and Sexual Activity     Alcohol use: Yes     Comment: few drinks a night     Drug use: Never     Sexual activity: None   Lifestyle     Physical activity:     Days per week: None     Minutes per session: None     Stress: None   Relationships     Social connections:     Talks on phone: None     Gets together: None     Attends [employer] service: None     Active member of club or organization: None     Attends meetings of clubs or organizations: None     Relationship status: None     Intimate partner violence:     Fear of current or ex partner: None     Emotionally abused: None     Physically abused: None     Forced sexual activity: None   Other Topics Concern     Parent/sibling w/ CABG, MI or angioplasty before 65F 55M? Not Asked   Social History Narrative     None     Smoker, occasional alcohol use      Family History:   History reviewed. No pertinent family history.  Reviewed and not felt to be contributory.        Home Medications:     Prior to Admission Medications   Prescriptions Last Dose Informant Patient Reported? Taking?   fish oil-omega-3 fatty acids 1000 MG capsule 2/17/2020 at Unknown time Self Yes Yes   Sig: Take 1 g by mouth daily   fluticasone (FLONASE) 50 MCG/ACT nasal spray 2/17/2020 at Unknown time Pharmacy Yes Yes   Sig: [place] 2 sprays into both nostrils daily   hydrochlorothiazide (HYDRODIURIL) 25 MG tablet 2/17/2020 at Unknown time Pharmacy Yes Yes   Sig: Take 25 mg by mouth daily   lisinopril  (PRINIVIL/ZESTRIL) 20 MG tablet 2/17/2020 at Unknown time Pharmacy Yes Yes   Sig: Take 20 mg by mouth daily    metFORMIN (GLUCOPHAGE-XR) 500 MG 24 hr tablet 2/17/2020 at PM Pharmacy Yes Yes   Sig: Take 500 mg by mouth daily (with dinner)    naproxen sodium (ANAPROX) 220 MG tablet 2/17/2020 at Unknown time Self Yes Yes   Sig: Take 220 mg by mouth daily      Facility-Administered Medications: None          Allergy:   No Known Allergies       Inpatient Medications:   Scheduled Meds:    fluticasone  2 spray Both Nostrils Daily     insulin aspart   Subcutaneous TID AC     insulin aspart  1-7 Units Subcutaneous TID AC     insulin aspart  1-5 Units Subcutaneous At Bedtime     insulin glargine  15 Units Subcutaneous QAM AC     lisinopril  5 mg Oral Daily     metoprolol tartrate  25 mg Oral BID     senna-docusate  1 tablet Oral BID    Or     senna-docusate  2 tablet Oral BID     sodium chloride (PF)  3 mL Intracatheter Q8H     PRN Meds: acetaminophen, glucose **OR** dextrose **OR** glucagon, lidocaine 4%, lidocaine (buffered or not buffered), melatonin, naloxone, nitroGLYcerin, ondansetron **OR** ondansetron, oxyCODONE-acetaminophen, potassium chloride, potassium chloride with lidocaine, potassium chloride, potassium chloride, sodium chloride (PF)        Review of Systems    The Review of Systems is negative other than noted in the HPI    CONSTITUTIONAL: negative for fever, chills, change in weight  INTEGUMENTARY/SKIN: no rash or obvious new lesions  ENT/MOUTH: no sore throat, new sinus pain or nasal drainage, no neck mass noted  RESP: No pleuretic pain, No cough, no hemoptysis, No SOB   CV: negative for chest pain, palpitations or peripheral edema, currently  GI: no nausea, vomiting, change in stools, GI work-up pending  : no dysuria or hematuria  MUSCULOSKELETAL: Back and leg pain as above  ENDOCRINE: New diagnosis of diabetes  PSYCHIATRIC: no change in mood stable  LYMPHATIC: no new lymphadenopathy  HEME: Recent  "diagnosis of anemia  NEURO: see HPI       Physical Exam:   Physical Exam   Vitals:  Height:5' 9\"  Weight:175 lbs 12.8 oz   Temp: 97.9  F (36.6  C) Temp src: Oral BP: (!) 144/77 Pulse: 66 Heart Rate: 67 Resp: 16 SpO2: 98 % O2 Device: None (Room air)    General Appearance:  No acute distress  Neuro:       Mental Status Exam:    Alert and oriented.  Language and speech intact       Cranial Nerves:   Vision/visual fields intact to finger counting.  Pupils are equal and reactive to light.  Extraocular movements intact.  Facial strength and sensation is normal.  No jaw or tongue deviation.  Shoulder elevation symmetrical.  Hearing intact.  Fundus: Technically difficult          Motor:   Dupuytren's contracture right hand.  Deltoid, triceps, biceps 5/5, digit extension 3/4, interossei 3/4, hip flexion 2/5, hip abduction 5/5, hip abduction 5/5, quadriceps 3-/5, hamstring 3/5, dorsiflexion 3/5, inversion 3/5, eversion 3/5.  Decreased bulk of the right thigh, tone normal x4          Reflexes: Absent right knee and ankle, reduced left knee, absent left ankle, +1 both upper extremities       Sensory: Absent vibration sense at the metatarsal joint and toe, reduced at the ankle, present in the hands.  Pinprick is subjective and inconsistently reduced in the right leg compared to the left                   Coordination:   Decreased coordination in the right leg consistent with weakness       Gait: Not tested due to fall risk concern with the right leg weakness  Neck: no nuchal rigidity, normal thyroid. No carotid bruits.    Cardiovascular: Irregular rate and rhythm, no m/r/g  Extremities: No clubbing, no cyanosis, no edema       Data:   ROUTINE IP LABS   CBC RESULTS:     Recent Labs   Lab 02/19/20  0455 02/18/20  2226 02/18/20  1435 02/18/20  0526 02/17/20 2025 02/17/20  1541   WBC 7.0  --   --  5.9  --  15.7*   RBC 4.34*  --   --  4.38*  --  5.76   HGB 12.5* 12.5* 11.9* 12.9*  --  17.0   HCT 36.9* 36.4* 34.8* 37.0*  --  48.2 "   *  --   --  146* 173 208     Basic Metabolic Panel:   Recent Labs   Lab Test 02/19/20  0455 02/18/20  0526 02/17/20  1541    140 127*   POTASSIUM 3.3* 3.6 3.7   CHLORIDE 107 110* 94   CO2 26 26 21   BUN 11 23 28   CR 0.71 0.80 1.33*   * 188* 396*   SUZANNE 8.4* 8.1* 9.8     Liver panel:  Recent Labs   Lab Test 02/17/20  1541 02/13/20  0110   PROTTOTAL 7.6 8.9*   ALBUMIN 3.9 4.6   BILITOTAL 0.9 0.4   ALKPHOS 83 122   AST 9 12   ALT 13 17      Lipid Profile:  Recent Labs   Lab Test 02/19/20  0455   CHOL 116   HDL 38*   LDL 46   TRIG 160*     Thyroid Panel:  Recent Labs   Lab Test 02/18/20  0526   TSH 0.96      Vitamin B12:   Recent Labs   Lab Test 02/18/20  0526   B12 212      A1C:   Recent Labs   Lab Test 02/17/20 2025   A1C 11.5*     CRP inflammation:   Recent Labs   Lab Test 02/18/20  0526   CRP 5.2     ESR:   Recent Labs   Lab Test 02/18/20  0526   SED 8            IMAGING:   All imaging studies were reviewed personally  CT ANGIOGRAM OF THE HEAD AND NECK WITHOUT AND WITH CONTRAST  2/18/2020  8:44 PM      HISTORY: Right temporal infarct on MRI.     TECHNIQUE:  Precontrast localizing scans were followed by CT  angiography with an injection of 70mL [id number]-[id number] IV with scans through  the head and neck.  3D post processing was performed, images were  archived to PACS and used in interpretation of this study.  Estimates  of carotid stenoses are made relative to the distal internal carotid  artery diameters except as noted. Radiation dose for this scan was  reduced using automated exposure control, adjustment of the mA and/or  kV according to patient size, or iterative reconstruction technique.     COMPARISON: MRI 2/18/2020     CT HEAD FINDINGS:  No contrast enhancing lesions.   Cerebral blood  flow is grossly normal.     CT ANGIOGRAM HEAD FINDINGS: Calcified atherosclerotic plaque is seen  in the carotid siphons. This causes at most mild stenosis. There is  fetal origin of the right posterior cerebral  artery from the internal  carotid, a normal variant. Arteries are widely patent. There is a  small saccular aneurysm projecting inferiorly off the distal left  carotid siphon about 2.5 mm maximum diameter. No other aneurysm.   Venous circulation is unremarkable.      CT ANGIOGRAM NECK FINDINGS:   Right carotid artery: Calcified plaque in the bifurcation.  No  significant stenosis.       Left carotid artery: Calcified plaque in the bifurcation.  No  significant stenosis.       Vertebral arteries:   No significant stenosis.       Other findings: None.                                                                      IMPRESSION:   1. No evidence of arterial occlusion or significant stenosis to  account for the infarct.  2. Small saccular aneurysm projecting inferiorly off the left carotid  siphon, 2.5 mm maximum diameter.  3. Calcified atherosclerotic plaque in the carotid bifurcations and  siphons without significant stenosis  MRI BRAIN WITHOUT AND WITH CONTRAST  2/18/2020 12:44 PM     HISTORY:  recent spell of A fib RVR, RLE weakness, r.o CVA      TECHNIQUE:  Multiplanar, multisequence MRI of the brain without and  with 8 mL Gadavist     COMPARISON: None.     FINDINGS:     Intracranial contents: There are areas of restricted diffusion in the  right temporal lobe. The largest or restricted diffusion is just  lateral to the trigone of the right lateral ventricle. These areas of  restricted diffusion are consistent with recent areas of infarction.  There is no evidence for any hemorrhage or mass effect.  There are no  gadolinium enhancing lesions. The arteries at the base of the brain  and the dural venous sinuses appear patent.      Sella:  No significant abnormality accounting for technique.      Orbit: No significant abnormality accounting for technique.       Sinus/mastoids: No significant paranasal sinus mucosal disease. No  significant middle ear or mastoid effusion.     Bones/soft tissues: No aggressive  osseous lesion involving the  calvarium, skull base, or visualized upper cervical spine.                                                                       IMPRESSION:  Areas of restricted diffusion in the right temporal lobe.  These are consistent with recent infarcts.  No bleed or mass effect    MRI lumbar:  Addendum by Kulwinder Collins MD on 02/14/2020  8:09 AM  Indication:  Right leg weakness and stiffness.     Technique:  Noncontrast sagittal and axial T1, T2, and sagittal STIR sequences are   provided.    Comparison:  No prior studies available for comparison at this institution.    Findings:  Lumbar lordosis is preserved. There is a mild anterior wedging subacute   compression fracture of L1 with 25 percent loss of vertebral body height.   Minimal retropulsion of the posterior superior corner. No prevertebral or   paraspinal soft tissue swelling. No aggressive osseous lesions. The conus   medullaris is normal in signal and located at L1. Multilevel small chronic   Schmorl`s nodes. Sacroiliac joint degenerative changes. Small left lower   pole renal cyst. There is a 2 cm T2 hyperintense lesion right hepatic lobe   potentially representing a cyst.     T11-12: No spinal canal stenosis or neural foraminal narrowing.   T12-L1: Mild facet arthrosis. No spinal canal stenosis or neural foramina   narrowing.   L1-2: Mild disc bulge and mild facet arthrosis. No spinal canal stenosis   or neural foramina narrowing.   L2-3: Mild disc bulge. Mild facet arthrosis. No spinal canal stenosis or   neural foramina narrowing.   L3-4: Mild annular bulge. No spinal canal stenosis or neural foramina   narrowing.   L4-5: Mild disc bulge. Mild facet arthrosis. No spinal canal stenosis or   neural foramina narrowing.   L5-S1: Moderate facet arthrosis. Normal disc. No spinal canal stenosis or   neural foramina narrowing.     Impression:  1.  Mild anterior wedging subacute compression fracture of L1 with 25   percent loss of  vertebral body height. Minimal retropulsion of the   posterior superior corner.   2. Mild disc and facet degenerative changes in the lumbar spine, detailed   above.   3. No significant spinal canal stenosis or neural foramina narrowing.   4. Mild left curvature of the lumbar spine.   5. There is a 2 cm T2 hyperintense lesion right hepatic lobe potentially   representing a cyst

## 2020-02-19 NOTE — PROGRESS NOTES
02/19/20 1506   Quick Adds   Type of Visit Initial Occupational Therapy Evaluation   Living Environment   Lives With alone   Living Arrangements house  (Rambler style home with lower level )   Transportation Anticipated family or friend will provide  (Pt typically drives. )   Living Environment Comment Family and friends can assist as needed upon discharge.    Self-Care   Usual Activity Tolerance good   Current Activity Tolerance moderate   Equipment Currently Used at Home cane, straight   Functional Level   Ambulation 0-->independent   Transferring 0-->independent   Toileting 0-->independent   Bathing 0-->independent   Dressing 0-->independent   Fall history within last six months yes   Number of times patient has fallen within last six months 2   General Information   Onset of Illness/Injury or Date of Surgery - Date 02/17/20   Referring Physician Staci Chan MD   Patient/Family Goals Statement Home    Additional Occupational Profile Info/Pertinent History of Current Problem Per chart: Anson Moise is a 64 years old male with PMH of HTN, ongoing RLE weakness and back pain and recently diagnosed DM2 presented to ER with generalized weakness. Noted to have severe dehydration with SANTHOSH, pseudohyponatremia, lactic acidosis and a new onset of A. fib RVR.  Admitted as IMC on 2/17/2020 for further management. 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   Cognitive Status Examination   Orientation orientation to person, place and time   Level of Consciousness alert   Sensory Examination   Sensory Quick Adds   (Numbness and tingling in BLE's )   Pain Assessment   Patient Currently in Pain Yes, see Vital Sign flowsheet   Transfer Skills   Transfer Transfer Safety Analysis Bed/Chair;Transfer Skill: Stand to Sit;Transfer Safety Analysis Sit/Stand   Transfer Skill: Bed to  "Chair/Chair to Bed   Level of Isabela: Bed to Chair contact guard   Physical Assist/Nonphysical Assist: Bed to Chair verbal cues;set-up required;1 person assist   Transfer Skill: Sit to Stand   Level of Isabela: Sit/Stand stand-by assist   Physical Assist/Nonphysical Assist: Sit/Stand verbal cues;set-up required   Toilet Transfer   Toilet Transfer Toilet Transfer Skill;Toilet Transfer Safety Analysis   Transfer Skill: Toilet Transfer   Level of Isabela: Toilet stand-by assist   Physical Assist/Nonphysical Assist: Toilet verbal cues;set-up required   Lower Body Dressing   Level of Isabela: Dress Lower Body contact guard   Physical Assist/Nonphysical Assist: Dress Lower Body 1 person assist;verbal cues;set-up required   Instrumental Activities of Daily Living (IADL)   Previous Responsibilities meal prep;laundry;housekeeping;shopping;yardwork;medication management;finances;driving   General Therapy Interventions   Planned Therapy Interventions ADL retraining;IADL retraining;cognition;transfer training   Clinical Impression   Criteria for Skilled Therapeutic Interventions Met yes, treatment indicated   OT Diagnosis Decreased activity tolerance for I/ADLs    Influenced by the following impairments Decreased activity tolerance for I/ADLs    Assessment of Occupational Performance 1-3 Performance Deficits   Identified Performance Deficits Decreased activity tolerance for I/ADLs  (Dressing, bathing, toileting)   Clinical Decision Making (Complexity) Low complexity   Therapy Frequency Daily   Predicted Duration of Therapy Intervention (days/wks) 4 days   Risks and Benefits of Treatment have been explained. Yes   Patient, Family & other staff in agreement with plan of care Yes   [facility] [employer] TM \"6 Clicks\"   2016, Trustees of [facility], under license to [employer].  All rights reserved.   6 Clicks Short Forms Daily Activity Inpatient Short Form   [facility] AM-PAC  \"6 Clicks\" " Daily Activity Inpatient Short Form   1. Putting on and taking off regular lower body clothing? 3 - A Little   2. Bathing (including washing, rinsing, drying)? 3 - A Little   3. Toileting, which includes using toilet, bedpan or urinal? 3 - A Little   4. Putting on and taking off regular upper body clothing? 4 - None   5. Taking care of personal grooming such as brushing teeth? 3 - A Little   6. Eating meals? 4 - None   Daily Activity Raw Score (Score out of 24.Lower scores equate to lower levels of function) 20   Total Evaluation Time   Total Evaluation Time (Minutes) 8

## 2020-02-19 NOTE — PLAN OF CARE
DATE & TIME: 20 [id number]    Cognitive Concerns/ Orientation : A&Ox4   BEHAVIOR & AGGRESSION TOOL COLOR: Green  CIWA SCORE: NA  ABNL VS/O2: VSS on RA  MOBILITY: SBA, uses IV pole  PAIN MANAGMENT: Denies  DIET: Mod CHO, cardiac  BOWEL/BLADDER: Continent B&B, no BM this shift. To have bowel prep tonight  ABNL LAB/BG: B  DRAIN/DEVICES: 3 PIV, 1 infusing  TELEMETRY RHYTHM: SR  SKIN: WDL  TESTS/PROCEDURES: Had CT and MRI done today. To have colonoscopy tomorrow, NPO at midnight with bowel prep this PM.  D/C DAY/GOALS/PLACE: Pending results of GI workup tomorrow.  OTHER IMPORTANT INFO: Patient passed nursing speech/swallow study after CT. Recently diagnosed with diabetes and HTN, needs education and reinforcement.

## 2020-02-19 NOTE — PLAN OF CARE
DATE & TIME: 2/19/2020                  Cognitive Concerns/ Orientation : Alert and Oriented x4   BEHAVIOR & AGGRESSION TOOL COLOR: Green   CIWA SCORE: NA    ABNL VS/O2: VSS on RA  MOBILITY: SBA  PAIN MANAGMENT: Denies   DIET: NPO at 0000  BOWEL/BLADDER: Continent, on bowel prep   ABNL LAB/BG:  at bedtime and 273 at 0200  DRAIN/DEVICES: PIV SL x2, IVF infusing in one.   TELEMETRY RHYTHM: NSR   SKIN: Intact   TESTS/PROCEDURES: Colonoscopy tomorrow   D/C DAY/GOALS/PLACE: 1-2 days pending   OTHER IMPORTANT INFO:

## 2020-02-19 NOTE — OR NURSING
EGD and Colonoscopy done per Dr. Gamino. Specimens taken and sent to lab. Report called to floor nurse.

## 2020-02-19 NOTE — PROGRESS NOTES
02/19/20 1426   Quick Adds   Type of Visit Initial PT Evaluation   Living Environment   Lives With alone   Living Arrangements house   Home Accessibility stairs to enter home;stairs within home   Number of Stairs, Main Entrance 3   Stair Railings, Main Entrance none   Number of Stairs, Within Home, Primary 10   Stair Railings, Within Home, Primary railing on left side (ascending)   Transportation Anticipated car, drives self   Living Environment Comment Pt lives in a house without issue. House has stairs to basement he doesn't frequenctlyt use except for laundry.   Self-Care   Usual Activity Tolerance moderate   Current Activity Tolerance fair   Regular Exercise Yes   Activity/Exercise Type biking;other (see comments)  (riding bike along paths, housework.)   Exercise Amount/Frequency other (see comments)   Equipment Currently Used at Home cane, straight   Functional Level Prior   Ambulation 0-->independent   Transferring 0-->independent   Toileting 0-->independent   Bathing 0-->independent   Communication 0-->understands/communicates without difficulty   Swallowing 0-->swallows foods/liquids without difficulty   Cognition 0 - no cognition issues reported   Fall history within last six months yes   Number of times patient has fallen within last six months 2   Which of the above functional risks had a recent onset or change? ambulation;transferring;fall history   Prior Functional Level Comment Pt was attempting to work in smaller amounts, but has had increasing R LE weakness affecting his job.    General Information   Onset of Illness/Injury or Date of Surgery - Date 02/17/20   Referring Physician Staci Hernandez MD   Pertinent History of Current Problem (include personal factors and/or comorbidities that impact the POC) 64 years old male with past medical history of hypertension and recent diagnosis with diabetes was started on metformin who presented today with generalized weakness found to have severe  dehydration with SANTHOSH hyponatremia lactic acidosis and a new onset of A. fib RVR.   Precautions/Limitations fall precautions   General Observations Pt laying supine with HOB raised; alert   General Info Comments Eval & tx for deconditoning   Cognitive Status Examination   Orientation orientation to person, place and time   Level of Consciousness alert   Follows Commands and Answers Questions 100% of the time;able to follow multistep instructions   Personal Safety and Judgment intact   Memory intact   Pain Assessment   Patient Currently in Pain No   Integumentary/Edema   Integumentary/Edema no deficits were identifed   Posture    Posture Forward head position;Protracted shoulders   Range of Motion (ROM)   ROM Comment WFL UE, R LE unable to test secondary to weakness   Strength   Strength Comments R LE quad 2/5, R LE hip flexor 2/5, grossly impaired. L LE WFL   Bed Mobility   Bed Mobility Comments Supine>sit IND   Transfer Skills   Transfer Comments sit<>stand SBA   Gait   Gait Comments 10ft SPC with CGA   Sensory Examination   Sensory Perception Comments Loss of vibratory R LE,    Coordination   Coordination Comments Difficulty with R LE on shin, discontinuous   General Therapy Interventions   Planned Therapy Interventions balance training;gait training;neuromuscular re-education;strengthening;transfer training;risk factor education;home program guidelines;progressive activity/exercise   Clinical Impression   Criteria for Skilled Therapeutic Intervention yes, treatment indicated   PT Diagnosis deconditioning, impaired balance   Influenced by the following impairments decreased strength R LE, impaired balance   Functional limitations due to impairments Difficulty walking ,    Clinical Presentation Stable/Uncomplicated   Clinical Presentation Rationale clinical judgement   Clinical Decision Making (Complexity) Low complexity   Therapy Frequency Daily   Predicted Duration of Therapy Intervention (days/wks) 4 days  "  Anticipated Equipment Needs at Discharge front wheeled walker   Anticipated Discharge Disposition Transitional Care Facility;Acute Rehabilitation Facility;Home with Home Therapy   Risk & Benefits of therapy have been explained Yes   Patient, Family & other staff in agreement with plan of care Yes   Clinical Impression Comments Pt with increased fall risk secondary to R LE weakness and impaired balance. Would benefit from ARU vs. home health if able to progress during LOS   [facility] AM-PAC TM \"6 Clicks\"   2016, Trustees of [facility] [employer], under license to [employer].  All rights reserved.   6 Clicks Short Forms Basic Mobility Inpatient Short Form   [facility] AM-PAC  \"6 Clicks\" V.2 Basic Mobility Inpatient Short Form   1. Turning from your back to your side while in a flat bed without using bedrails? 4 - None   2. Moving from lying on your back to sitting on the side of a flat bed without using bedrails? 4 - None   3. Moving to and from a bed to a chair (including a wheelchair)? 4 - None   4. Standing up from a chair using your arms (e.g., wheelchair, or bedside chair)? 4 - None   5. To walk in hospital room? 3 - A Little   6. Climbing 3-5 steps with a railing? 3 - A Little   Basic Mobility Raw Score (Score out of 24.Lower scores equate to lower levels of function) 22   Total Evaluation Time   Total Evaluation Time (Minutes) 12     "

## 2020-02-19 NOTE — PLAN OF CARE
Discharge Planner PT   PT: PT evaluation completed & tx initiated. Anson Moise is a 64 years old male with PMH of HTN, ongoing RLE weakness and back pain and recently diagnosed DM2 presented to ER with generalized weakness. Noted to have severe dehydration with SANTHOSH, pseudohyponatremia, lactic acidosis and a new onset of A. fib RVR.  Admitted as IMC on 2/17/2020 for further management.    Patient plan for discharge: Home  Current status: Pt presents with significantly weak R LE strength that he reports has been going on for ~2months. He reports altered sensation in R LE and difficulty walking. Pt transfers IND supine>sit, supervision sit<>stand and ambulates with need for CGA with SPC and SBA with FWW 160ft. Pt has 3 steps in house to enter and a flight of stairs to the basement for laundry. Lives alone and previously completed all household tasks including snowblowing, lawn mowing, hanging Sylvester lights, etc.  Pt is currently unable to complete a SAQ or SLR with R LE, indicating increased risk of R knee buckling, which he has reports occurring at home several times and reports need to hold furniture at home.  Barriers to return to prior living situation: Current level of A for I/ADLs; lives alone; cognition, hx of falls/fall risk, stairs  Recommendations for discharge: ARU  Rationale for recommendations: Pt lives alone and is below baseline in I/ADLs and functional mobility. 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 Anticipate pt will progress with skilled PT intervention and medical management of DM; may be able to discharge home with HH PT and FWW use as pt high fall risk at this time.        Entered by: Melinda Root 02/19/2020 4:20 PM

## 2020-02-19 NOTE — PROGRESS NOTES
Clinical Swallow Evaluation:      02/19/20 1356   General Information   Onset Date 02/17/20   Start of Care Date 02/19/20   Referring Physician Staci Chan MD   Patient Profile Review/OT: Additional Occupational Profile Info See Profile for full history and prior level of function   Patient/Family Goals Statement to figure out what's wrong with my leg   Swallowing Evaluation Bedside swallow evaluation   Behaviorial Observations WFL (within functional limits)   Mode of current nutrition Oral diet   Type of oral diet Regular;Thin liquid   Respiratory Status Room air   Comments Anson Moise is a 64 years old male with PMH of HTN, ongoing RLE weakness and back pain and recently diagnosed DM2 presented to ER with generalized weakness. Noted to have severe dehydration with SANTHOSH, pseudohyponatremia, lactic acidosis and a new onset of A. fib RVR. 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 Passed RN dysphagia screen. On a regular diet. No hx of SLP services or dysphagia.    Clinical Swallow Evaluation   Oral Musculature anomalies present   Structural Abnormalities none present   Dentition present and adequate   Mucosal Quality good   Mandibular Strength and Mobility intact   Oral Labial Strength and Mobility   (minimal left asymmetry)   Lingual Strength and Mobility impaired protrusion  (minimal left asymmetry)   Velar Elevation intact   Buccal Strength and Mobility intact   Laryngeal Function Swallow;Voicing initiated;Dry swallow palpated;Cough   Additional Documentation Yes   Clinical Swallow Eval: Thin Liquid Texture Trial   Mode of Presentation, Thin Liquids straw;self-fed   Volume of Liquid or Food Presented 4 oz   Oral Phase of Swallow WFL   Pharyngeal Phase of Swallow intact   Diagnostic Statement no s/s noted   Clinical Swallow Eval: Solid Food Texture Trial   Mode of Presentation, Solid self-fed   Volume of Solid Food Presented sandwhich, chips   Oral  Phase, Solid WFL   Pharyngeal Phase, Solid intact   Diagnostic Statement no s/s noted   Swallow Eval: Clinical Impressions   Skilled Criteria for Therapy Intervention No problems identified which require skilled intervention   Functional Assessment Scale (FAS) 6   Dysphagia Outcome Severity Scale (CORY) Level 6 - CORY   Treatment Diagnosis dysphagia   Diet texture recommendations Regular diet;Thin liquids   Anticipated Discharge Disposition home   Clinical Impression Comments Clinical swallow evaluation completed per CVA protocol, pt did pass dysphagia screen and was placed on a regular diet s/p EGD/colonoscopy this AM - eating lunch of entire sandwich, bag of chips and thin liquids upon arrival. He currently presents with a functional oropharyngeal swallow, minimal oromotor deficits (minimal left facial/labial/lingual asymmetry). Mastication complete with min increased time, full oral clearance achieved. Hyolaryngeal ROM robust to palpation. No signs/sx aspiration or any difficulty observed during meal. Pt judged appropriate to continue regular solid and thin liquid textures with general safe swallow precautions. No ongoing swallow intervention is indicated, pt in agreement. He was educated on signs/sx to monitor for and to notify RN/MD if any occur.   Total Evaluation Time   Total Evaluation Time (Minutes) 23

## 2020-02-19 NOTE — PLAN OF CARE
Discharge Planner SLP   Patient plan for discharge: did not discuss  Current status: Clinical swallow evaluation completed per CVA protocol, pt did pass dysphagia screen and was placed on a regular diet s/p EGD/colonoscopy this AM - eating lunch of entire sandwich, bag of chips and thin liquids upon arrival. He currently presents with a functional oropharyngeal swallow, minimal oromotor deficits (minimal left facial/labial/lingual asymmetry). Mastication complete with min increased time, full oral clearance achieved. Hyolaryngeal ROM robust to palpation. No signs/sx aspiration or any difficulty observed during meal. Pt judged appropriate to continue regular solid and thin liquid textures with general safe swallow precautions. No ongoing swallow intervention is indicated, pt in agreement. He was educated on signs/sx to monitor for and to notify RN/MD if any occur.  Barriers to return to prior living situation: no SLP barriers  Recommendations for discharge: home per SLP needs  Rationale for recommendations: No ongoing swallow intervention is indicated. Will complete orders. Please re-consult if any concerns arise.        Entered by: Yeni Corbett 02/19/2020 1:50 PM

## 2020-02-19 NOTE — PLAN OF CARE
S: Pt. Has blood sugars that are self-reported to be above 200 at home when doing self blood sugar monitoring.    B: Patient was previously managing serum glucose with oral medication, metformin, and was newly diagnosed with diabetes mellitus type 2.  Pt. is now on a sliding scale for insulin, carbohydrate counting, and usage of insulin aspart and insulin glargine    A: Pt. has demonstrated some mild cognitive deficits during occupational therapy session and scored a 20 of 30 on from an average population score of 26.  Pt. states that he does not have a pill organizer and takes medications one by one from their bottles in order to track their administration.    R: Pt is at risk of missing or not remembering medication administration times.  Recommend strong encouragement to buy a small pill organizer, have a checkbox list of medications by day and hour to track which medications were taken, and an a written journal of blood sugar values taken before meal times.

## 2020-02-19 NOTE — PROGRESS NOTES
[facility]    Cardiology Consultation     Date of Admission:  2/17/2020    Assessment & Plan     1.  PAF, now converted to normal sinus rhythm  2.  Presentation of dehydration possible sepsis syndrome  3.  New diagnosis of hypertension  4.  New diagnosis of diabetes mellitus  5.  Acute renal insufficiency, improvement with hydration  6.  S/P EGD and Colonscopy    Recommendations    1.  PAF: Patient has converted normal sinus rhythm spontaneously.  He has an elevated chads vascular 2 risk score based on available data.  He has no contraindications to long-term anticoagulation.  Continue with lopressor 25 mg bid.  Would start Eliquis or Coumadin if OK with GI    2.  Patient can follow-up with EP upon discharge.  Cardiology will sign off.    3.  Hypertension: We will restart lisinopril low-dose as well as metoprolol 25 mg twice daily    4. Echocardiogram is normal      Lio Vergara MD      HPI:    Patient is a 64-year-old male with no known chronic medical issues.  He recently was diagnosed with hypertension diabetes mellitus in clinic on a routine wellness check.  He was then recommended to start medical therapy including metformin as well as lisinopril for blood pressure.  Notably he had a significant elevated blood glucose of 628 while in clinic.  He was subsequently transferred after the lab work was obtained.  He was assessed in the ER and based on lab work and symptoms felt to be dehydrated.  He was given IV fluid hydration vigorously and has improved lab work wise as well as clinically.  He states preceding his symptoms he had noticed some excessive thirst and going to the bathroom more often than normal.  He has not had any significant cardiac complaints.  He reports of no chest pain, PND orthopnea or other cardiac related concerns.  EKG was performed as he was tachycardic and his pulse was irregular and this demonstrated atrial fibrillation.  Diltiazem was going to be initiated to  help however he spontaneously converted on his own.  Given his atrial fibrillation cardiology service is subsequently consulted.  At present he states that he is feeling improved.     Echo:  The left ventricle is normal in size. Proximal septal thickening is noted.  Left ventricular systolic function is normal. The visual ejection fraction is  estimated at 60-65%. Left ventricular diastolic function is normal. No  regional wall motion abnormalities noted. There is no thrombus seen in the  left ventricle.  The right ventricle is normal size. The right ventricular systolic function is  normal.  Trace to mild mitral and tricuspid regurgitation.  No pericardial effusion.  No previous study for comparison.    Primary Care Physician   [facility]      Patient Active Problem List   Diagnosis     Sepsis (H)       Past Medical History   I have reviewed this patient's medical history and updated it with pertinent information if needed.   Past Medical History:   Diagnosis Date     Cerebral infarction (H)      Diabetes (H)      Hypertension        Past Surgical History   I have reviewed this patient's surgical history and updated it with pertinent information if needed.  History reviewed. No pertinent surgical history.    Prior to Admission Medications   Prior to Admission Medications   Prescriptions Last Dose Informant Patient Reported? Taking?   fish oil-omega-3 fatty acids 1000 MG capsule 2/17/2020 at Unknown time Self Yes Yes   Sig: Take 1 g by mouth daily   fluticasone (FLONASE) 50 MCG/ACT nasal spray 2/17/2020 at Unknown time Pharmacy Yes Yes   Sig: [place] 2 sprays into both nostrils daily   hydrochlorothiazide (HYDRODIURIL) 25 MG tablet 2/17/2020 at Unknown time Pharmacy Yes Yes   Sig: Take 25 mg by mouth daily   lisinopril (PRINIVIL/ZESTRIL) 20 MG tablet 2/17/2020 at Unknown time Pharmacy Yes Yes   Sig: Take 20 mg by mouth daily    metFORMIN (GLUCOPHAGE-XR) 500 MG 24 hr tablet 2/17/2020 at PM Pharmacy Yes Yes  "  Sig: Take 500 mg by mouth daily (with dinner)    naproxen sodium (ANAPROX) 220 MG tablet 2/17/2020 at Unknown time Self Yes Yes   Sig: Take 220 mg by mouth daily      Facility-Administered Medications: None     Current Facility-Administered Medications   Medication Dose Route Frequency     fluticasone  2 spray Both Nostrils Daily     insulin aspart   Subcutaneous TID AC     insulin aspart  1-7 Units Subcutaneous TID AC     insulin aspart  1-5 Units Subcutaneous At Bedtime     insulin glargine  15 Units Subcutaneous QAM AC     lisinopril  5 mg Oral Daily     metoprolol tartrate  25 mg Oral BID     senna-docusate  1 tablet Oral BID    Or     senna-docusate  2 tablet Oral BID     sodium chloride (PF)  3 mL Intracatheter Q8H     Current Facility-Administered Medications   Medication Last Rate     HEParin 900 Units/hr (02/19/20 1237)     Allergies   No Known Allergies    Social History    reports that he has been smoking cigarettes. He has been smoking about 0.25 packs per day. He has never used smokeless tobacco. He reports current alcohol use. He reports that he does not use drugs.    Family History   History reviewed. No pertinent family history.    Review of Systems   The comprehensive 10 point Review of Systems is negative other than noted in the HPI or here.     Physical Exam   Vital Signs with Ranges  Temp:  [97.9  F (36.6  C)-98.6  F (37  C)] 97.9  F (36.6  C)  Pulse:  [64-84] 66  Heart Rate:  [62-89] 67  Resp:  [9-20] 16  BP: (111-183)/() 144/77  SpO2:  [95 %-99 %] 98 %  Wt Readings from Last 4 Encounters:   02/18/20 79.7 kg (175 lb 12.8 oz)   09/14/06 91.6 kg (202 lb)   07/14/06 86.2 kg (190 lb)     I/O last 3 completed shifts:  In: 240 [P.O.:240]  Out: -       Vitals: BP (!) 144/77   Pulse 66   Temp 97.9  F (36.6  C) (Oral)   Resp 16   Ht 1.753 m (5' 9\")   Wt 79.7 kg (175 lb 12.8 oz)   SpO2 98%   BMI 25.96 kg/m      Constitutional:   awake, alert, cooperative, no apparent distress, and appears " stated age     Neck:   Supple, symmetrical, trachea midline, no adenopathy, thyroid symmetric, not enlarged and no tenderness, skin normal     Back:   Symmetric, no curvature, spinous processes are non-tender on palpation, paraspinous muscles are non-tender on palpation, no costal vertebral tenderness     Lungs:   No increased work of breathing, good air exchange, clear to auscultation bilaterally, no crackles or wheezing     Cardiovascular:   Normal apical impulse, regular rate and rhythm, normal S1 and S2, no S3 or S4, and no murmur noted     Abdomen:   No scars, normal bowel sounds, soft, non-distended, non-tender, no masses palpated, no hepatosplenomegally     Neurologic:   Awake, alert, oriented to name, place and time.  Cranial nerves II-XII are grossly intact.  Motor is 5 out of 5 bilaterally.  Cerebellar finger to nose, heel to shin intact.  Sensory is intact.  Babinski down going, Romberg negative, and gait is normal.       No lab results found in last 7 days.    Invalid input(s): TROPONINIES    Recent Labs   Lab 02/19/20  0455 02/18/20  2226 02/18/20  1435 02/18/20  0526 02/17/20  2025 02/17/20  1541 02/13/20  0110   WBC 7.0  --   --  5.9  --  15.7* 12.9*   HGB 12.5* 12.5* 11.9* 12.9*  --  17.0 17.0   MCV 85  --   --  85  --  84 81   *  --   --  146* 173 208 248     --   --  140  --  127* 131*   POTASSIUM 3.3*  --   --  3.6  --  3.7 3.0*   CHLORIDE 107  --   --  110*  --  94 94   CO2 26  --   --  26  --  21 26   BUN 11  --   --  23  --  28 20   CR 0.71  --   --  0.80  --  1.33* 0.68   GFRESTIMATED >90  --   --  >90  --  56* >90   GFRESTBLACK >90  --   --  >90  --  65 >90   ANIONGAP 5  --   --  4  --  12 11   SUZANNE 8.4*  --   --  8.1*  --  9.8 9.8   *  --   --  188*  --  396* 322*   ALBUMIN  --   --   --   --   --  3.9 4.6   PROTTOTAL  --   --   --   --   --  7.6 8.9*   BILITOTAL  --   --   --   --   --  0.9 0.4   ALKPHOS  --   --   --   --   --  83 122   ALT  --   --   --   --   --  13 17    AST  --   --   --   --   --  9 12     No results for input(s): CHOL, HDL, LDL, TRIG, CHOLHDLRATIO in the last 62151 hours.  Recent Labs   Lab 02/19/20  0455 02/18/20  2226 02/18/20  1435 02/18/20  0526 02/17/20 2025 02/17/20  1541   WBC 7.0  --   --  5.9  --  15.7*   HGB 12.5* 12.5* 11.9* 12.9*  --  17.0   HCT 36.9* 36.4* 34.8* 37.0*  --  48.2   MCV 85  --   --  85  --  84   *  --   --  146* 173 208   IRON  --   --   --  75  --   --    IRONSAT  --   --   --  35  --   --    FEB  --   --   --  215*  --   --    B12  --   --   --  212  --   --    FOLIC  --   --   --  14.3  --   --      Recent Labs   Lab 02/17/20  1541 02/13/20  0113   PHV 7.35 7.38   PO2V 32 31   PCO2V 43 44   HCO3V 24 26     No results for input(s): NTBNPI, NTBNP in the last 168 hours.  No results for input(s): DD in the last 168 hours.  Recent Labs   Lab 02/18/20  0526   SED 8   CRP 5.2     Recent Labs   Lab 02/19/20  0455 02/18/20  0526 02/17/20 2025   * 146* 173     Recent Labs   Lab 02/18/20  0526   TSH 0.96     Recent Labs   Lab 02/17/20  1804   COLOR Light Yellow   APPEARANCE Clear   URINEGLC 300*   URINEBILI Negative   URINEKETONE 5*   SG 1.005   UBLD Negative   URINEPH 5.0   PROTEIN Negative   NITRITE Negative   LEUKEST Negative   RBCU 0   WBCU 2       Imaging:  Recent Results (from the past 48 hour(s))   XR Chest 2 Views    Narrative    XR CHEST TWO VIEWS   2/17/2020 5:06 PM     HISTORY: Hypotension and tachycardia with shoulder pain, check for  pneumonia.    COMPARISON: None available      Impression    IMPRESSION: No acute airspace disease.    ZINA DALEY MD       Echo:  No results found for this or any previous visit (from the past 4320 hour(s)).

## 2020-02-19 NOTE — CONSULTS
[facility]  Gastroenterology Consultation         Anson Moise  [address] [address]  64 year old male    Admission Date/Time: 2/17/2020  Primary Care Provider: Francisco Syed  Referring / Attending Physician:  Dr. Small    We were asked to see the patient in consultation by Dr. Small for evaluation of anemia recent possible stroke with atrial fibrillation need for endoscopic evaluation..      CC: Anemia    HPI:  Anson Moise is a 64 year old male who was admitted with right lower extremity weakness which has been progressively getting worse patient has past history significant for type 2 diabetes chronic back pain hypertension dehydration acute kidney injury pseudohyponatremia lactic acidosis significantly elevated blood sugars with A. fib and rapid ventricular response.  Patient was admitted for further evaluation during MRI patient was found to have a small neurological lesion.  Patient is currently being evaluated in neurology.  Patient has drop in his hemoglobin patient presented with hemoglobin of 17 now down to 12 however patient did get significant amount of fluids this very well can be dilutional patient was scheduled to have a colonoscopy patient is in need for anticoagulation patient has been using nonsteroidals.  Patient's hemoglobin A1c is 11.5.  Patient is clinically feeling well now patient is able to walk and move patient is denying any fever chills chest pain shortness of breath polyuria hematuria or dysuria or any other signs of GI bleed.    ROS: A comprehensive ten point review of systems was negative aside from those in mentioned in the HPI.      PAST MED HX:  I have reviewed this patient's medical history and updated it with pertinent information if needed.   Past Medical History:   Diagnosis Date     Hypertension        MEDICATIONS:   Prior to Admission Medications   Prescriptions Last Dose Informant Patient Reported? Taking?   fish oil-omega-3  fatty acids 1000 MG capsule 2/17/2020 at Unknown time Self Yes Yes   Sig: Take 1 g by mouth daily   fluticasone (FLONASE) 50 MCG/ACT nasal spray 2/17/2020 at Unknown time Pharmacy Yes Yes   Sig: [place] 2 sprays into both nostrils daily   hydrochlorothiazide (HYDRODIURIL) 25 MG tablet 2/17/2020 at Unknown time Pharmacy Yes Yes   Sig: Take 25 mg by mouth daily   lisinopril (PRINIVIL/ZESTRIL) 20 MG tablet 2/17/2020 at Unknown time Pharmacy Yes Yes   Sig: Take 20 mg by mouth daily    metFORMIN (GLUCOPHAGE-XR) 500 MG 24 hr tablet 2/17/2020 at PM Pharmacy Yes Yes   Sig: Take 500 mg by mouth daily (with dinner)    naproxen sodium (ANAPROX) 220 MG tablet 2/17/2020 at Unknown time Self Yes Yes   Sig: Take 220 mg by mouth daily      Facility-Administered Medications: None       ALLERGIES: No Known Allergies    SOCIAL HISTORY:  Social History     Tobacco Use     Smoking status: Current Every Day Smoker     Packs/day: 0.25     Types: Cigarettes     Smokeless tobacco: Never Used   Substance Use Topics     Alcohol use: Yes     Comment: few drinks a night     Drug use: None       FAMILY HISTORY:  No family history on file.    PHYSICAL EXAM:   General awake alert oriented comfortable  Vital Signs with Ranges  Temp: 98.1  F (36.7  C) Temp src: Oral BP: (!) 149/81 Pulse: 84 Heart Rate: 89 Resp: 16 SpO2: 97 % O2 Device: None (Room air) Oxygen Delivery: 2 LPM  I/O last 3 completed shifts:  In: 2240 [P.O.:240; IV Piggyback:2000]  Out: -     Constitutional: healthy, alert and no distress   Cardiovascular: negative, PMI normal. No lifts, heaves, or thrills. RRR. No murmurs, clicks gallops or rub  Respiratory: negative, Percussion normal. Good diaphragmatic excursion. Lungs clear  Head: Normocephalic. No masses, lesions, tenderness or abnormalities  Neck: Neck supple. No adenopathy. Thyroid symmetric, normal size,, Carotids without bruits.  Abdomen: Abdomen soft, non-tender. BS normal. No masses, organomegaly  SKIN: no suspicious lesions or  steven          ADDITIONAL COMMENTS:   I reviewed the patient's new clinical lab test results.   Recent Labs   Lab Test 02/18/20  1435 02/18/20  0526 02/17/20  2025 02/17/20  1541 02/13/20  0110   WBC  --  5.9  --  15.7* 12.9*   HGB 11.9* 12.9*  --  17.0 17.0   MCV  --  85  --  84 81   PLT  --  146* 173 208 248     Recent Labs   Lab Test 02/18/20  0526 02/17/20  1541 02/13/20  0110   POTASSIUM 3.6 3.7 3.0*   CHLORIDE 110* 94 94   CO2 26 21 26   BUN 23 28 20   ANIONGAP 4 12 11     Recent Labs   Lab Test 02/17/20  1804 02/17/20  1541 02/13/20  0110   ALBUMIN  --  3.9 4.6   BILITOTAL  --  0.9 0.4   ALT  --  13 17   AST  --  9 12   PROTEIN Negative  --   --        I reviewed the patient's new imaging results.        CONSULTATION ASSESSMENT AND PLAN:    Active Problems:    Sepsis (H)    Assessment: Very pleasant 64-year-old gentleman with complicated past history for poorly controlled diabetes significantly elevated blood sugars electrolyte abnormalities dehydration leading to significant cardiac arrhythmia neurological symptoms weakness of lower extremities concerning for possible stroke patient is currently in need for anticoagulation patient dropped hemoglobin significantly after fluid resuscitation.  Patient is concerned to have possible GI bleed source.  Patient was scheduled to have a colonoscopy as an outpatient.  At this point giving patient's current situation I will recommend the patient to be prepped and scheduled for colonoscopy tomorrow risk-benefit plan were discussed in detail with hospitalist team and also with the patient.  Plan is to proceed with above-mentioned work-up.  If colonoscopy is negative then proceed with upper GI endoscopy too.  Thank you very much for letting me participate in his care please do not hesitate to contact me for any further questions.                Aristeo Espinoza MD, FACP  [facility].  Office: [phone]  Cell : [phone]

## 2020-02-20 ENCOUNTER — APPOINTMENT (OUTPATIENT)
Dept: PHYSICAL THERAPY | Facility: CLINIC | Age: 65
DRG: 064 | End: 2020-02-20
Payer: COMMERCIAL

## 2020-02-20 ENCOUNTER — APPOINTMENT (OUTPATIENT)
Dept: OCCUPATIONAL THERAPY | Facility: CLINIC | Age: 65
DRG: 064 | End: 2020-02-20
Payer: COMMERCIAL

## 2020-02-20 ENCOUNTER — APPOINTMENT (OUTPATIENT)
Dept: MRI IMAGING | Facility: CLINIC | Age: 65
DRG: 064 | End: 2020-02-20
Attending: PSYCHIATRY & NEUROLOGY
Payer: COMMERCIAL

## 2020-02-20 LAB
ANION GAP SERPL CALCULATED.3IONS-SCNC: 4 MMOL/L (ref 3–14)
BUN SERPL-MCNC: 7 MG/DL (ref 7–30)
CALCIUM SERPL-MCNC: 9.4 MG/DL (ref 8.5–10.1)
CHLORIDE SERPL-SCNC: 109 MMOL/L (ref 94–109)
CO2 SERPL-SCNC: 28 MMOL/L (ref 20–32)
COPATH REPORT: NORMAL
CREAT SERPL-MCNC: 0.55 MG/DL (ref 0.66–1.25)
GFR SERPL CREATININE-BSD FRML MDRD: >90 ML/MIN/{1.73_M2}
GLUCOSE BLDC GLUCOMTR-MCNC: 142 MG/DL (ref 70–99)
GLUCOSE BLDC GLUCOMTR-MCNC: 147 MG/DL (ref 70–99)
GLUCOSE BLDC GLUCOMTR-MCNC: 182 MG/DL (ref 70–99)
GLUCOSE BLDC GLUCOMTR-MCNC: 188 MG/DL (ref 70–99)
GLUCOSE BLDC GLUCOMTR-MCNC: 256 MG/DL (ref 70–99)
GLUCOSE SERPL-MCNC: 190 MG/DL (ref 70–99)
HCT VFR BLD AUTO: 38.2 % (ref 40–53)
HGB BLD-MCNC: 12.9 G/DL (ref 13.3–17.7)
LMWH PPP CHRO-ACNC: 0.25 IU/ML
PLATELET # BLD AUTO: 154 10E9/L (ref 150–450)
POTASSIUM SERPL-SCNC: 3.9 MMOL/L (ref 3.4–5.3)
SODIUM SERPL-SCNC: 141 MMOL/L (ref 133–144)

## 2020-02-20 PROCEDURE — 99232 SBSQ HOSP IP/OBS MODERATE 35: CPT | Performed by: INTERNAL MEDICINE

## 2020-02-20 PROCEDURE — 72158 MRI LUMBAR SPINE W/O & W/DYE: CPT

## 2020-02-20 PROCEDURE — 25000132 ZZH RX MED GY IP 250 OP 250 PS 637: Performed by: INTERNAL MEDICINE

## 2020-02-20 PROCEDURE — 25000132 ZZH RX MED GY IP 250 OP 250 PS 637: Performed by: HOSPITALIST

## 2020-02-20 PROCEDURE — 25500064 ZZH RX 255 OP 636: Performed by: HOSPITALIST

## 2020-02-20 PROCEDURE — 82565 ASSAY OF CREATININE: CPT | Performed by: HOSPITALIST

## 2020-02-20 PROCEDURE — 85027 COMPLETE CBC AUTOMATED: CPT | Performed by: HOSPITALIST

## 2020-02-20 PROCEDURE — 97535 SELF CARE MNGMENT TRAINING: CPT | Mod: GO

## 2020-02-20 PROCEDURE — 12000000 ZZH R&B MED SURG/OB

## 2020-02-20 PROCEDURE — 25000131 ZZH RX MED GY IP 250 OP 636 PS 637: Performed by: HOSPITALIST

## 2020-02-20 PROCEDURE — A9585 GADOBUTROL INJECTION: HCPCS | Performed by: HOSPITALIST

## 2020-02-20 PROCEDURE — 80048 BASIC METABOLIC PNL TOTAL CA: CPT | Performed by: HOSPITALIST

## 2020-02-20 PROCEDURE — 25000128 H RX IP 250 OP 636: Performed by: HOSPITALIST

## 2020-02-20 PROCEDURE — 97110 THERAPEUTIC EXERCISES: CPT | Mod: GP

## 2020-02-20 PROCEDURE — 95912 NRV CNDJ TEST 11-12 STUDIES: CPT

## 2020-02-20 PROCEDURE — 95863 NEEDLE EMG 3 EXTREMITIES: CPT

## 2020-02-20 PROCEDURE — 00000146 ZZHCL STATISTIC GLUCOSE BY METER IP

## 2020-02-20 PROCEDURE — 85520 HEPARIN ASSAY: CPT | Performed by: HOSPITALIST

## 2020-02-20 PROCEDURE — 97116 GAIT TRAINING THERAPY: CPT | Mod: GP

## 2020-02-20 PROCEDURE — 36415 COLL VENOUS BLD VENIPUNCTURE: CPT | Performed by: HOSPITALIST

## 2020-02-20 RX ORDER — GADOBUTROL 604.72 MG/ML
8 INJECTION INTRAVENOUS ONCE
Status: COMPLETED | OUTPATIENT
Start: 2020-02-20 | End: 2020-02-20

## 2020-02-20 RX ADMIN — METOPROLOL TARTRATE 25 MG: 25 TABLET ORAL at 08:52

## 2020-02-20 RX ADMIN — FLUTICASONE PROPIONATE 2 SPRAY: 50 SPRAY, METERED NASAL at 08:52

## 2020-02-20 RX ADMIN — HEPARIN SODIUM 900 UNITS/HR: 10000 INJECTION, SOLUTION INTRAVENOUS at 16:18

## 2020-02-20 RX ADMIN — PANTOPRAZOLE SODIUM 40 MG: 40 TABLET, DELAYED RELEASE ORAL at 06:27

## 2020-02-20 RX ADMIN — FENOFIBRATE 54 MG: 54 TABLET ORAL at 08:52

## 2020-02-20 RX ADMIN — INSULIN ASPART 1 UNITS: 100 INJECTION, SOLUTION INTRAVENOUS; SUBCUTANEOUS at 18:32

## 2020-02-20 RX ADMIN — LISINOPRIL 5 MG: 5 TABLET ORAL at 08:52

## 2020-02-20 RX ADMIN — INSULIN ASPART 1 UNITS: 100 INJECTION, SOLUTION INTRAVENOUS; SUBCUTANEOUS at 08:58

## 2020-02-20 RX ADMIN — INSULIN ASPART 3 UNITS: 100 INJECTION, SOLUTION INTRAVENOUS; SUBCUTANEOUS at 12:57

## 2020-02-20 RX ADMIN — METOPROLOL TARTRATE 25 MG: 25 TABLET ORAL at 23:00

## 2020-02-20 RX ADMIN — INSULIN GLARGINE 18 UNITS: 100 INJECTION, SOLUTION SUBCUTANEOUS at 08:53

## 2020-02-20 RX ADMIN — PANTOPRAZOLE SODIUM 40 MG: 40 TABLET, DELAYED RELEASE ORAL at 16:18

## 2020-02-20 RX ADMIN — GADOBUTROL 8 ML: 604.72 INJECTION INTRAVENOUS at 19:47

## 2020-02-20 ASSESSMENT — ACTIVITIES OF DAILY LIVING (ADL)
ADLS_ACUITY_SCORE: 13

## 2020-02-20 NOTE — CONSULTS
Reviewed the Lumbar MRI on [facility]. Has L1 mild compression fracture. There is no neuro compression anywhere in the lumbar spine to explain any right leg weakness.

## 2020-02-20 NOTE — CONSULTS
Spoke to patient about Eliquis cost, as well as potential insulin costs. Patient is understanding of cost and prefers this medication.    MD: If patient needs a rapid-acting insulin, insurance prefers Humalog instead of Novolog. Will not cover Novolog.    Rosanna Blair [facility] Discharge Pharmacy Liaison  Liaison Cell: [phone]

## 2020-02-20 NOTE — PLAN OF CARE
Discharge Planner OT   Patient plan for discharge: Home  Current status: Completed the MoCA cog screen to further assess safety w/I/ADL's. Pt administered the MoCA to assess safety with I/ADL's. Pt scored a 20/30, with a normative score being 27/30. Pt had difficulties in the areas of visuospatial/executive functioning, delayed recall, attention/language. Pt would benefit from further cognitive screening. Pt educated on OP OT for driving assessment. Pt expressed understanding.       Barriers to return to prior living situation: Current level of assist for I/ADL, lives alone, cognition  Recommendations for discharge: TCU  Rationale for recommendations: Pt lives alone and is below baseline in I/ADLs and functional mobility. Anticipate pt will progress in established IP OT goal areas with continued therapy. If pt returns home, then increased assist in I/ADLs (home management tasks, transportation) and home OT. And OP OT for driving assessment once able.        Entered by: Rosanna Rivera 02/20/2020 9:49 AM

## 2020-02-20 NOTE — PLAN OF CARE
PT:  Discharge Planner PT   Patient plan for discharge: Return home  Current status: Pt required SBA for bed mobility and transfers. Ambulated 550 ft with SEC with CGA using step-through pattern regressing to step-to as pt fatigued. No episodes of knee buckling but weakness noted throughout activities. Participated in LE strengthening with focus on R gross movement.  Barriers to return to prior living situation: Falls risk and has had multiple falls due to knee buckling on the R  Recommendations for discharge: ARU  Rationale for recommendations: Pt would benefit from continued PT while admitted and at discharge to improve strength, balance, mobility to increase independence, reduce falls risk and increase safety before returning home.        Entered by: Yarely Barrios 02/20/2020 3:22 PM

## 2020-02-20 NOTE — PROGRESS NOTES
[facility]  Gastroenterology Progress Note     Anson Moise MRN# [id number]   YOB: 1955 Age: 64 year old          Assessment and Plan:    Update: Patient underwent EGD that noted erythematous erosive gastropathy and duodenopathy with no stigmata of bleeding. Biopsies taken    Anemia  Anson Moise is a very pleasant 64-year-old gentleman with complicated past history for poorly controlled diabetes, significantly elevated blood sugars, electrolyte abnormalities, dehydration leading to significant cardiac arrhythmia, neurological symptoms, weakness of lower extremities, concerning for possible stroke, patient is currently in need for anticoagulation patient dropped hemoglobin significantly after fluid resuscitation.  There was concern for GI bleed. REsults of EGD and colonoscopy above. Hemoglobin stable.    - Ok for patient to start AC  - Daily CBC  - Continue with oral PPI BID  - Regular diet      Active Problems:     Severe sepsis (H)  Lactic acidosis due to diabetes mellitus (H)  Type 2 diabetes mellitus with hyperglycemia, without long-term current use of insulin (H)      Interval History:   no new complaints, doing well, denies chest pain, denies shortness of breath, denies abdominal pain, alert, oriented to person, place and time and doing well; no cp, sob, n/v/d, or abd pain.              Review of Systems:   C: NEGATIVE for fever, chills, change in weight  E/M: NEGATIVE for ear, mouth and throat problems  R: NEGATIVE for significant cough or SOB  CV: NEGATIVE for chest pain, palpitations or peripheral edema             Medications:   I have reviewed this patient's current medications    fenofibrate  54 mg Oral Daily     fluticasone  2 spray Both Nostrils Daily     insulin aspart   Subcutaneous TID AC     insulin aspart  1-7 Units Subcutaneous TID AC     insulin aspart  1-5 Units Subcutaneous At Bedtime     insulin glargine  18 Units Subcutaneous QAM AC     lisinopril  5 mg  Oral Daily     metoprolol tartrate  25 mg Oral BID     pantoprazole  40 mg Oral BID AC     senna-docusate  1 tablet Oral BID    Or     senna-docusate  2 tablet Oral BID     sodium chloride (PF)  3 mL Intracatheter Q8H                  Physical Exam:   Vitals were reviewed  Vital Signs with Ranges  Temp:  [94.7  F (34.8  C)-99.2  F (37.3  C)] 97.6  F (36.4  C)  Pulse:  [64-78] 72  Heart Rate:  [62-82] 74  Resp:  [9-22] 16  BP: (111-183)/() 154/66  SpO2:  [95 %-100 %] 99 %  No intake/output data recorded.  Constitutional: healthy, alert and no distress   Cardiovascular: negative, PMI normal. No lifts, heaves, or thrills. RRR. No murmurs, clicks gallops or rub  Respiratory: negative, Percussion normal. Good diaphragmatic excursion. Lungs clear  Head: Normocephalic. No masses, lesions, tenderness or abnormalities  Neck: Neck supple. No adenopathy. Thyroid symmetric, normal size,, Carotids without bruits.  Abdomen: Abdomen soft, non-tender. BS normal. No masses, organomegaly               Data:   I reviewed the patient's new clinical lab test results.   Recent Labs   Lab Test 02/20/20 0813 02/19/20 0455 02/18/20 2226 02/18/20  0526  02/17/20  1541   WBC  --  7.0  --   --  5.9  --  15.7*   HGB 12.9* 12.5* 12.5*   < > 12.9*  --  17.0   MCV  --  85  --   --  85  --  84    144*  --   --  146*   < > 208    < > = values in this interval not displayed.     Recent Labs   Lab Test 02/20/20  0813 02/19/20 2004 02/19/20 0455 02/18/20  0526   POTASSIUM 3.9 3.8 3.3* 3.6   CHLORIDE 109  --  107 110*   CO2 28  --  26 26   BUN 7  --  11 23   ANIONGAP 4  --  5 4     Recent Labs   Lab Test 02/17/20  1804 02/17/20  1541 02/13/20  0110   ALBUMIN  --  3.9 4.6   BILITOTAL  --  0.9 0.4   ALT  --  13 17   AST  --  9 12   PROTEIN Negative  --   --        I reviewed the patient's new imaging results.    All laboratory data reviewed  All imaging studies reviewed by me.    Nereida Hilton PA-C,  2/20/2020  [facility]  Office : [phone]  Cell: [phone] (Dr. Espinoza)  Cell: [phone] (Nereida Hilton PA-C)

## 2020-02-20 NOTE — PLAN OF CARE
DATE & TIME: 2/20/2020       [id number]           Cognitive Concerns/ Orientation : Alert and Oriented x4   BEHAVIOR & AGGRESSION TOOL COLOR: Green   CIWA SCORE: NA    ABNL VS/O2: VSS on RA  MOBILITY: SBA, some weakness in RLE since brain infarct, pt states this is improving.  PAIN MANAGMENT: Denies   DIET: diabetic, tolerating, needs continuing education on this  BOWEL/BLADDER: Continent B/B  ABNL LAB/BG:  /256. On lantus and sliding scale/carb count  DRAIN/DEVICES: PIV SL x3, IVF infusing heparin Gtt in one on L arm.   TELEMETRY RHYTHM: NSR   SKIN: Intact   TESTS/PROCEDURES: .  EMG  completed today , awaiting results.  Still needs to have MRI  D/C DAY/GOALS/PLACE: 1-2 days pending anticoagulation  OTHER IMPORTANT INFO:  heparin gtt continued for new intermittent a-fib and brain infarct.

## 2020-02-20 NOTE — PLAN OF CARE
DATE & TIME: 2/20/2020     Cognitive Concerns/ Orientation : Alert and Oriented x4   BEHAVIOR & AGGRESSION TOOL COLOR: Green   CIWA SCORE: NA    ABNL VS/O2: VSS on RA  MOBILITY: SBA  PAIN MANAGMENT: Denies   DIET: Mod Carb   BOWEL/BLADDER: Continent/ No BM/ Bowel prepped last night   ABNL LAB/BG: NA  DRAIN/DEVICES: PIV x3, Heparin drip infusing at 9 units/hr.   TELEMETRY RHYTHM: SD  SKIN: Intact   TESTS/PROCEDURES: EGD and Colonoscopy today.   D/C DAY/GOALS/PLACE: Pending, needs to be anticoagulated .  OTHER IMPORTANT INFO: NA

## 2020-02-20 NOTE — PROGRESS NOTES
[facility]    Medicine Progress Note - Hospitalist Service        Date of Admission:  2/17/2020  3:26 PM    Assessment & Plan:   Anson Moise is a 64 years old male with PMH of HTN, ongoing RLE weakness and back pain and recently diagnosed DM2 presented to ER with generalized weakness. Noted to have severe dehydration with SANTHOSH, pseudohyponatremia, lactic acidosis and a new onset of A. fib RVR.  Admitted as IMC on 2/17/2020 for further management.     SIRS:   -Tachycardic, marked leukocytosis, lactic acidosis and hypotensive to 80s (but while in A fib RVR) without clear source of infection. CXR negative, UA unremarkable, ongoing back pain but has improved, no diarrhea or abdominal pain.  Afebrile.  Presumed SIRS due to severe dehydration due to osmotic diuresis from severe hyperglycemia.   -Blood cultures sent from ER, started on vancomycin and Zosyn on admission, cultures are negative so far, and no concern for infection and so IV antibiotics discontinued   -Lactic acidosis and leukocytosis resolved with IV hydration. BP stable.  -Orthospine eval given right leg weakness and back pain in the setting of SIRS, see below.     A. fib with RVR  HTN  Patient had an episode of A. fib RVR but then he converted to sinus with IV fluid resuscitation.  -TSH normal  -ECHO normal LVEF, no WMA.   -Cardiology consulted, started on metoprolol, lisinopril resumed. Recommended anticoagulation given [id number] score of 5 (age, HTN, DM2, stroke). Converted and currently sinus and rate controlled.  -Heparin drip currently, if no any interventions needed from spine and neurology, will transition to warfarin/apixaban. Pharmacy liasion consulted for NOAC cost    DM, type 2- presumed new diagnosis  -Blood sugar was noted to be more than 600 on routine labs as outpatient 5 days PTA.  Was started on metformin.  HbA1c is 11.5.   -continue Lantus at 18 units every morning, and continue aspart per carb counting. ISS.    -Metformin at discharge     Acute kidney injury: Cr at presentation 1.33, normal baseline.  Patient was recently diagnosed with DM 2, was restarted on metformin.  Had blood sugar of up to 600 at the time of diagnosis.  Suspect ongoing hyperosmotic diuresis due to uncontrolled blood sugar, prerenal state and dehydration causing SANTHOSH.  -Cr normal with IV hydration  -UA without proteinuria     Hypokalemia  Pseudo-hyponatremia: Sodium 127 at admission expected, corrected 134 for blood sugar of 396.   -Normal with IV hydration.  -Replace K per protocol     Chronic back pain  RLE weakness  Ongoing low back pain since around Thanksgiving.  Though back pain is improved, progressive right lower extremity noted.  Patient has fallen without any injuries since his right knee joel. Was evaluated by PCP and had MRI of the lumbar spine as outpatient 2/14. Shows mild anterior wedging subacute compression fracture of L1 with 25% loss of vertebral body height.  Minimal retropulsion of the posterior superior corner. Severity of leg weakness not explained by above lumbar spinal MRI findings, and also not by stroke findings on MRI brain.  -Orthospine consulted-, they do not feel that there is any neurocompression to explain for right leg weakness  -general neurology consulted EMG completed today; MRI of the lumbosacral plexus ordered for today    Ischemic stroke  -given a fib with RVR and Rt leg weakness, MRI brain was done. Showed recent right temporal lobe infarct. CTA head and neck done, showed no vascular stenosis or occlusions but Small saccular aneurysm projecting inferiorly off the left carotid siphon, 2.5 mm maximum diameter.  -Neurology consulted, scans as above. A1C as above. LDL at goal -46. PT OT SLP evals completed. TCU at discharge. ECHO showed normal LVEF and no flow across septum based on doppler. Start fibrate given elevated TG.     Anemia, acute, mild.   Erosive gastritis.   Presented with hemoglobin of 17, noted  "same Hb level as outpatient last month.   -Noted drop in Hb down to 12 overnight after admission.  Patient does take low-dose ibuprofen daily but no hematochezia or melena. Was due for colonoscopy as outpatient, has never had one done prior.  - B12 folate normal stool for guaiac negative. No iron deficiency but given drop without other source, and needing AC, GI consulted, s/p EGD and colonoscopy this am, erosive gastropathy on EGD, 3 mm sigmoid rectal polyp on colonoscopy, resected  - OK to continue AC with PPI BID.     Diet: Regular Diet Adult     DVT Prophylaxis: Heparin gtt  Chilel Catheter: not present  Code Status: Full Code     Disposition Plan    Expected discharge: 2 - 3 days, recommended to transitional care unit once  W/u completed    Entered: Etahn Cowart MD 02/20/2020, 1:31 PM        The patient's care was discussed with the Bedside Nurse and Patient.    Ethan Cowart MD  Hospitalist Service  [facility]    ______________________________________________________________________    Interval History   Continues to have weakness of the right lower extremity although slightly better.  Patient completed EMG today.  No fever or chills.    Data reviewed today: I reviewed all medications, new labs and imaging results over the last 24 hours. I personally reviewed no images or EKG's today.    Physical Exam   Vital signs:  Temp: 97.6  F (36.4  C) Temp src: Oral BP: (!) 154/66 Pulse: 72 Heart Rate: 74 Resp: 16 SpO2: 99 % O2 Device: None (Room air) Oxygen Delivery: 2 LPM Height: 175.3 cm (5' 9\") Weight: 79.7 kg (175 lb 12.8 oz)  Estimated body mass index is 25.96 kg/m  as calculated from the following:    Height as of this encounter: 1.753 m (5' 9\").    Weight as of this encounter: 79.7 kg (175 lb 12.8 oz).      Wt Readings from Last 2 Encounters:   02/18/20 79.7 kg (175 lb 12.8 oz)   09/14/06 91.6 kg (202 lb)       Gen: AAOX3, NAD, comfortable  HEENT: Supple neck, moist oral mucosa, no " pallor  Resp: CTA B/L, normal WOB, no crackles, no wheezes  CVS: RRR, no murmur  Abd/GI: Soft, non-tender. BS- normoactive.  No G/R/R  Skin: Warm, dry no rashes  MSK: No joint deformities, no pedal edema  Neuro- CN- intact.  Weakness of the right lower extremity 2+-3/5      Data   Recent Labs   Lab 02/20/20  0813 02/19/20 2004 02/19/20  0455 02/18/20  2226  02/18/20  0526  02/17/20  1541   WBC  --   --  7.0  --   --  5.9  --  15.7*   HGB 12.9*  --  12.5* 12.5*   < > 12.9*  --  17.0   MCV  --   --  85  --   --  85  --  84     --  144*  --   --  146*   < > 208     --  138  --   --  140  --  127*   POTASSIUM 3.9 3.8 3.3*  --   --  3.6  --  3.7   CHLORIDE 109  --  107  --   --  110*  --  94   CO2 28  --  26  --   --  26  --  21   BUN 7  --  11  --   --  23  --  28   CR 0.55*  --  0.71  --   --  0.80  --  1.33*   ANIONGAP 4  --  5  --   --  4  --  12   SUZANNE 9.4  --  8.4*  --   --  8.1*  --  9.8   *  --  193*  --   --  188*  --  396*   ALBUMIN  --   --   --   --   --   --   --  3.9   PROTTOTAL  --   --   --   --   --   --   --  7.6   BILITOTAL  --   --   --   --   --   --   --  0.9   ALKPHOS  --   --   --   --   --   --   --  83   ALT  --   --   --   --   --   --   --  13   AST  --   --   --   --   --   --   --  9    < > = values in this interval not displayed.       No results found for this or any previous visit (from the past 24 hour(s)).  Medications     HEParin 900 Units/hr (02/20/20 0911)       fenofibrate  54 mg Oral Daily     fluticasone  2 spray Both Nostrils Daily     insulin aspart   Subcutaneous TID AC     insulin aspart  1-7 Units Subcutaneous TID AC     insulin aspart  1-5 Units Subcutaneous At Bedtime     insulin glargine  18 Units Subcutaneous QAM AC     lisinopril  5 mg Oral Daily     metoprolol tartrate  25 mg Oral BID     pantoprazole  40 mg Oral BID AC     senna-docusate  1 tablet Oral BID    Or     senna-docusate  2 tablet Oral BID     sodium chloride (PF)  3 mL Intracatheter Q8H

## 2020-02-20 NOTE — PROGRESS NOTES
[facility] [place]  Neuroscience and Spine [place]  Neurology Daily Note      Admission Date:2/17/2020   Date of service: 02/20/2020   Hospital Day: 4                                                   Assessment and Plan:   #.  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  --EMG c/w peripheral neuropathy, active denervation right leg  Mri pelvis/lumbar plexus pending.  Assuming that this is from diabetic amyotrophy, recovery is slow taking weeks to months with continued improvement even up to 18 to 24 months.  For this reason rehabilitation is crucial particularly if there is any bracing or gait of assistive device to help with balance and to minimize fall risk.  Continue to optimize diabetes.     #.  Recent stroke related to atrial fibrillation  -- recommendations per stroke service/cardiology service regarding anticoagulation     #. DVT Prophylaxis  --Mechanical per hospitalist service  #. PT/OT/Speech  --Continue evaluations  #. Nutrition / GI Prophylaxis  --Per recommendations of speech therapy      Interval History:   Right leg weakness about the same       Review of Systems:   The Review of Systems is negative other than noted in the HPI       Medications:   Scheduled Meds:    fenofibrate  54 mg Oral Daily     fluticasone  2 spray Both Nostrils Daily     insulin aspart   Subcutaneous TID AC     insulin aspart  1-7 Units Subcutaneous TID AC     insulin aspart  1-5 Units Subcutaneous At Bedtime     insulin glargine  18 Units Subcutaneous QAM AC     lisinopril  5 mg Oral Daily     metoprolol tartrate  25 mg Oral BID     pantoprazole  40 mg Oral BID AC     senna-docusate  1 tablet Oral BID    Or     senna-docusate  2 tablet Oral BID     sodium chloride (PF)  3 mL Intracatheter Q8H     PRN Meds: acetaminophen, glucose **OR** dextrose **OR** glucagon, lidocaine 4%,  lidocaine (buffered or not buffered), melatonin, naloxone, nitroGLYcerin, ondansetron **OR** ondansetron, oxyCODONE-acetaminophen, potassium chloride, potassium chloride with lidocaine, potassium chloride, potassium chloride, sodium chloride (PF)        Physical Exam:   Vitals: Temp: 99.1  F (37.3  C) Temp src: Oral BP: (!) 151/77   Heart Rate: 70 Resp: 16 SpO2: 96 % O2 Device: None (Room air)    Vital Signs with Ranges: Temp:  [97.6  F (36.4  C)-99.2  F (37.3  C)] 99.1  F (37.3  C)  Heart Rate:  [70-82] 70  Resp:  [16] 16  BP: (142-154)/(66-77) 151/77  SpO2:  [96 %-99 %] 96 %    General Appearance:  No acute distress  Neuro:       Mental Status Exam:    Alert and oriented.  Language and speech intact       Cranial Nerves:   Vision/visual fields intact to finger counting.  Pupils are equal and reactive to light.  Extraocular movements intact.  Facial strength and sensation is normal.  No jaw or tongue deviation.  Shoulder elevation symmetrical.  Hearing intact.  Fundus: Technically difficult          Motor:   Dupuytren's contracture right hand.  Deltoid, triceps, biceps 5/5, digit extension 3/4, interossei 3/4, hip flexion 2/5, hip abduction 5/5, hip abduction 5/5, quadriceps 3-/5, hamstring 3/5, dorsiflexion 3/5, inversion 3/5, eversion 3/5.  Decreased bulk of the right thigh, tone normal x4          Reflexes: Absent right knee and ankle, reduced left knee, absent left ankle, +1 both upper extremities       Sensory: Absent vibration sense at the metatarsal joint and toe, reduced at the ankle, present in the hands.  Pinprick is subjective and inconsistently reduced in the right leg compared to the left                   Coordination:   Decreased coordination in the right leg consistent with weakness       Gait: Not tested due to fall risk concern with the right leg weakness  Neck: no nuchal rigidity, normal thyroid. No carotid bruits.    Cardiovascular: Irregular rate and rhythm, no m/r/g  Extremities: No clubbing,  no cyanosis, no edema       Data:   ROUTINE IP LABS (Last 3results)  CBC RESULTS:     Recent Labs   Lab Test 02/20/20  0813 02/19/20 0455 02/18/20 2226  02/18/20  0526  02/17/20  1541   WBC  --  7.0  --   --  5.9  --  15.7*   RBC  --  4.34*  --   --  4.38*  --  5.76   HGB 12.9* 12.5* 12.5*   < > 12.9*  --  17.0   HCT 38.2* 36.9* 36.4*   < > 37.0*  --  48.2    144*  --   --  146*   < > 208    < > = values in this interval not displayed.     Basic Metabolic Panel:  Recent Labs   Lab Test 02/20/20 0813 02/19/20 2004 02/19/20 0455 02/18/20  0526     --  138 140   POTASSIUM 3.9 3.8 3.3* 3.6   CHLORIDE 109  --  107 110*   CO2 28  --  26 26   BUN 7  --  11 23   CR 0.55*  --  0.71 0.80   *  --  193* 188*   SUZANNE 9.4  --  8.4* 8.1*     Liver panel:  Recent Labs   Lab Test 02/17/20  1541 02/13/20  0110   PROTTOTAL 7.6 8.9*   ALBUMIN 3.9 4.6   BILITOTAL 0.9 0.4   ALKPHOS 83 122   AST 9 12   ALT 13 17     Lipid Profile:  Recent Labs   Lab Test 02/19/20 0455   CHOL 116   HDL 38*   LDL 46   TRIG 160*     Thyroid Panel:  Recent Labs   Lab Test 02/18/20  0526   TSH 0.96      Vitamin B12:   Recent Labs   Lab Test 02/18/20  0526   B12 212      A1C:   Recent Labs   Lab Test 02/17/20 2025   A1C 11.5*   CRP inflammation:   Recent Labs   Lab Test 02/18/20  0526   CRP 5.2     ESR:   Recent Labs   Lab Test 02/18/20  0526   SED 8            IMAGING:   All imaging studies were reviewed personally          TIME     15minutes Evaluation/managment time on floor.

## 2020-02-21 ENCOUNTER — APPOINTMENT (OUTPATIENT)
Dept: OCCUPATIONAL THERAPY | Facility: CLINIC | Age: 65
DRG: 064 | End: 2020-02-21
Payer: COMMERCIAL

## 2020-02-21 ENCOUNTER — APPOINTMENT (OUTPATIENT)
Dept: PHYSICAL THERAPY | Facility: CLINIC | Age: 65
DRG: 064 | End: 2020-02-21
Payer: COMMERCIAL

## 2020-02-21 ENCOUNTER — HOSPITAL ENCOUNTER (INPATIENT)
Facility: CLINIC | Age: 65
End: 2020-02-21
Payer: COMMERCIAL

## 2020-02-21 LAB
CREAT SERPL-MCNC: 0.6 MG/DL (ref 0.66–1.25)
GFR SERPL CREATININE-BSD FRML MDRD: >90 ML/MIN/{1.73_M2}
GLUCOSE BLDC GLUCOMTR-MCNC: 113 MG/DL (ref 70–99)
GLUCOSE BLDC GLUCOMTR-MCNC: 135 MG/DL (ref 70–99)
GLUCOSE BLDC GLUCOMTR-MCNC: 170 MG/DL (ref 70–99)
GLUCOSE BLDC GLUCOMTR-MCNC: 226 MG/DL (ref 70–99)
INTERPRETATION ECG - MUSE: NORMAL
LMWH PPP CHRO-ACNC: 0.24 IU/ML

## 2020-02-21 PROCEDURE — 25000132 ZZH RX MED GY IP 250 OP 250 PS 637: Performed by: INTERNAL MEDICINE

## 2020-02-21 PROCEDURE — 00000146 ZZHCL STATISTIC GLUCOSE BY METER IP

## 2020-02-21 PROCEDURE — 97750 PHYSICAL PERFORMANCE TEST: CPT | Mod: GP | Performed by: PHYSICAL THERAPIST

## 2020-02-21 PROCEDURE — 36415 COLL VENOUS BLD VENIPUNCTURE: CPT | Performed by: HOSPITALIST

## 2020-02-21 PROCEDURE — 85520 HEPARIN ASSAY: CPT | Performed by: HOSPITALIST

## 2020-02-21 PROCEDURE — 82565 ASSAY OF CREATININE: CPT | Performed by: HOSPITALIST

## 2020-02-21 PROCEDURE — 12000000 ZZH R&B MED SURG/OB

## 2020-02-21 PROCEDURE — 25000131 ZZH RX MED GY IP 250 OP 636 PS 637: Performed by: HOSPITALIST

## 2020-02-21 PROCEDURE — 25000132 ZZH RX MED GY IP 250 OP 250 PS 637: Performed by: HOSPITALIST

## 2020-02-21 PROCEDURE — 99232 SBSQ HOSP IP/OBS MODERATE 35: CPT | Performed by: INTERNAL MEDICINE

## 2020-02-21 PROCEDURE — 97535 SELF CARE MNGMENT TRAINING: CPT | Mod: GO

## 2020-02-21 RX ORDER — HEPARIN SODIUM 10000 [USP'U]/100ML
900 INJECTION, SOLUTION INTRAVENOUS CONTINUOUS
Status: DISCONTINUED | OUTPATIENT
Start: 2020-02-21 | End: 2020-02-21

## 2020-02-21 RX ORDER — METFORMIN HCL 500 MG
500 TABLET, EXTENDED RELEASE 24 HR ORAL
Status: DISCONTINUED | OUTPATIENT
Start: 2020-02-21 | End: 2020-02-24 | Stop reason: HOSPADM

## 2020-02-21 RX ADMIN — INSULIN ASPART 1 UNITS: 100 INJECTION, SOLUTION INTRAVENOUS; SUBCUTANEOUS at 13:48

## 2020-02-21 RX ADMIN — APIXABAN 5 MG: 5 TABLET, FILM COATED ORAL at 19:46

## 2020-02-21 RX ADMIN — LISINOPRIL 5 MG: 5 TABLET ORAL at 08:08

## 2020-02-21 RX ADMIN — FLUTICASONE PROPIONATE 2 SPRAY: 50 SPRAY, METERED NASAL at 08:30

## 2020-02-21 RX ADMIN — FENOFIBRATE 54 MG: 54 TABLET ORAL at 08:08

## 2020-02-21 RX ADMIN — PANTOPRAZOLE SODIUM 40 MG: 40 TABLET, DELAYED RELEASE ORAL at 08:07

## 2020-02-21 RX ADMIN — INSULIN GLARGINE 18 UNITS: 100 INJECTION, SOLUTION SUBCUTANEOUS at 08:10

## 2020-02-21 RX ADMIN — PANTOPRAZOLE SODIUM 40 MG: 40 TABLET, DELAYED RELEASE ORAL at 17:08

## 2020-02-21 RX ADMIN — METOPROLOL TARTRATE 25 MG: 25 TABLET ORAL at 08:08

## 2020-02-21 RX ADMIN — METFORMIN HYDROCHLORIDE 500 MG: 500 TABLET, EXTENDED RELEASE ORAL at 17:08

## 2020-02-21 RX ADMIN — METOPROLOL TARTRATE 25 MG: 25 TABLET ORAL at 19:45

## 2020-02-21 ASSESSMENT — ACTIVITIES OF DAILY LIVING (ADL)
ADLS_ACUITY_SCORE: 13
ADLS_ACUITY_SCORE: 13
ADLS_ACUITY_SCORE: 14
ADLS_ACUITY_SCORE: 14
ADLS_ACUITY_SCORE: 12
ADLS_ACUITY_SCORE: 14

## 2020-02-21 NOTE — PLAN OF CARE
"DATE & TIME: 2/21/2020  AM shift  Cognitive Concerns/ Orientation : Alert and Oriented x4   BEHAVIOR & AGGRESSION TOOL COLOR: Green   CIWA SCORE: NA    ABNL VS/O2: VSS on RA except hypertensive BP 150s/80s  MOBILITY: SBA, ambulated in the [place] with nursing staff. Reports some weakness in RLE, describes it as \"it feels like my foot is a sponge\".  PAIN MANAGMENT: Denies   DIET: Mod carb diet, tolerating, carb count  BOWEL/BLADDER: Continent B/B. Reports two BMs today, still loose, colonoscopy done on 2/19/2020  ABNL LAB/BG:  BG /[id number]. On lantus and sliding scale/carb count  DRAIN/DEVICES: PIV infusing with heparin Gtt. Stop drip this evening at 8 pm and start PO Eliquis.  TELEMETRY RHYTHM: NSR   SKIN: Intact   TESTS/PROCEDURES: n/a  D/C DAY/GOALS/PLACE: 1-2 days, ARU is recommended. PT/OT are following.   OTHER IMPORTANT INFO: heparin gtt continued for new intermittent a-fib   "

## 2020-02-21 NOTE — PROGRESS NOTES
[facility]  Neuroscience and Spine [place]  Neurology Daily Note      Admission Date:2/17/2020   Date of service: 02/21/2020   Hospital Day: 5                                                   Assessment and Plan:   #.  Right lower extremity weakness,most c/w diabetic amyotrophy. Other findings c/w generalized peripheral neuropathy--EMG c/w peripheral neuropathy, active denervation right leg  Mri pelvis/lumbar plexus reveals no signficant diseases  Assuming that this is from diabetic amyotrophy, recovery is slow taking weeks to months with continued improvement even up to 18 to 24 months.  For this reason rehabilitation is crucial particularly if there is any bracing or gait of assistive device to help with balance and to minimize fall risk.  Continue to optimize diabetes.  Anecdotally pulse high dose steroids weekly have been associated with improvement of diabetic amyotrophy.  Discussed with patient, as the diabetes has been labile and noticing improvement in strength/walking in last day, inclined to monitor for now and review after rehab.  Will review again tomorrow.     #.  Recent stroke related to atrial fibrillation  -- recommendations per stroke service/cardiology service regarding anticoagulation     #. DVT Prophylaxis  --Mechanical per hospitalist service  #. PT/OT/Speech  --Continue evaluations  #. Nutrition / GI Prophylaxis  --Per recommendations of speech therapy      Interval History:   Right leg weakness considered better today, as able to walk better in PT with use of cane using right leg.       Review of Systems:   The Review of Systems is negative other than noted in the HPI       Medications:   Scheduled Meds:    apixaban ANTICOAGULANT  5 mg Oral BID     fenofibrate  54 mg Oral Daily     fluticasone  2 spray Both Nostrils Daily     insulin aspart   Subcutaneous TID AC     insulin aspart  1-7 Units Subcutaneous TID AC     insulin aspart  1-5 Units Subcutaneous At Bedtime      insulin glargine  18 Units Subcutaneous QAM AC     lisinopril  5 mg Oral Daily     metFORMIN  500 mg Oral Daily with supper     metoprolol tartrate  25 mg Oral BID     pantoprazole  40 mg Oral BID AC     senna-docusate  1 tablet Oral BID    Or     senna-docusate  2 tablet Oral BID     sodium chloride (PF)  3 mL Intracatheter Q8H     PRN Meds: acetaminophen, glucose **OR** dextrose **OR** glucagon, lidocaine 4%, lidocaine (buffered or not buffered), melatonin, naloxone, nitroGLYcerin, ondansetron **OR** ondansetron, oxyCODONE-acetaminophen, potassium chloride, potassium chloride with lidocaine, potassium chloride, potassium chloride, sodium chloride (PF)        Physical Exam:   Vitals: Temp: 98.2  F (36.8  C) Temp src: Axillary BP: (!) 141/77 Pulse: 62 Heart Rate: 76 Resp: 16 SpO2: 96 % O2 Device: None (Room air)    Vital Signs with Ranges: Temp:  [98.2  F (36.8  C)-99  F (37.2  C)] 98.2  F (36.8  C)  Pulse:  [61-64] 62  Heart Rate:  [64-76] 76  Resp:  [16-18] 16  BP: (141-165)/(63-89) 141/77  SpO2:  [96 %-98 %] 96 %    General Appearance:  No acute distress  Neuro:       Mental Status Exam:    Alert and oriented.  Language and speech intact       Cranial Nerves:   Vision/visual fields intact to finger counting.  Pupils are equal and reactive to light.  Extraocular movements intact.  Facial strength and sensation is normal.  No jaw or tongue deviation.  Shoulder elevation symmetrical.  Hearing intact.  Fundus: Technically difficult          Motor:   Dupuytren's contracture right hand.  Deltoid, triceps, biceps 5/5, digit extension 3/4, interossei 3/4, hip flexion 3-/5, hip abduction 5/5, hip abduction 5/5, quadriceps 3-/5, hamstring 3/5, dorsiflexion 3/5, inversion 3/5, eversion 3/5.  Decreased bulk of the right thigh, tone normal x4          Reflexes: Absent right knee and ankle, reduced left knee, absent left ankle, +1 both upper extremities       Sensory: Absent vibration sense at the metatarsal joint and toe,  reduced at the ankle, present in the hands.  Pinprick is subjective and inconsistently reduced in the right leg compared to the left                   Coordination:   Decreased coordination in the right leg consistent with weakness       Gait: Not tested due to fall risk concern with the right leg weakness  Neck: no nuchal rigidity, normal thyroid. No carotid bruits.    Cardiovascular: Irregular rate and rhythm, no m/r/g  Extremities: No clubbing, no cyanosis, no edema       Data:   ROUTINE IP LABS (Last 3results)  CBC RESULTS:     Recent Labs   Lab Test 02/20/20  0813 02/19/20 0455 02/18/20 2226 02/18/20  0526  02/17/20  1541   WBC  --  7.0  --   --  5.9  --  15.7*   RBC  --  4.34*  --   --  4.38*  --  5.76   HGB 12.9* 12.5* 12.5*   < > 12.9*  --  17.0   HCT 38.2* 36.9* 36.4*   < > 37.0*  --  48.2    144*  --   --  146*   < > 208    < > = values in this interval not displayed.     Basic Metabolic Panel:  Recent Labs   Lab Test 02/21/20  0844 02/20/20  0813 02/19/20 2004 02/19/20  0455 02/18/20  0526   NA  --  141  --  138 140   POTASSIUM  --  3.9 3.8 3.3* 3.6   CHLORIDE  --  109  --  107 110*   CO2  --  28  --  26 26   BUN  --  7  --  11 23   CR 0.60* 0.55*  --  0.71 0.80   GLC  --  190*  --  193* 188*   SUZANNE  --  9.4  --  8.4* 8.1*     Liver panel:  Recent Labs   Lab Test 02/17/20  1541 02/13/20  0110   PROTTOTAL 7.6 8.9*   ALBUMIN 3.9 4.6   BILITOTAL 0.9 0.4   ALKPHOS 83 122   AST 9 12   ALT 13 17     Lipid Profile:  Recent Labs   Lab Test 02/19/20  0455   CHOL 116   HDL 38*   LDL 46   TRIG 160*     Thyroid Panel:  Recent Labs   Lab Test 02/18/20  0526   TSH 0.96      Vitamin B12:   Recent Labs   Lab Test 02/18/20  0526   B12 212      A1C:   Recent Labs   Lab Test 02/17/20 2025   A1C 11.5*   CRP inflammation:   Recent Labs   Lab Test 02/18/20 0526   CRP 5.2     ESR:   Recent Labs   Lab Test 02/18/20 0526   SED 8            IMAGING:   All imaging studies were reviewed personally  Mri pelvis  reviewed.          TIME     25minutes Evaluation/managment time.

## 2020-02-21 NOTE — PLAN OF CARE
DATE & TIME: 2/20/2020  NOC     Cognitive Concerns/ Orientation : A&OX4  BEHAVIOR & AGGRESSION TOOL COLOR: Green   CIWA SCORE: N/a    ABNL VS/O2: VSS on RA ex elevated BP and irregular HR  MOBILITY: SBA with gb and cane. Some weakness in RLE   PAIN MANAGMENT: Denies   DIET: Mob card  BOWEL/BLADDER: Continent B/B, No BM  ABNL LAB/BG:  Hgb 12.9   DRAIN/DEVICES: PIV SL x3.   TELEMETRY RHYTHM: SB  SKIN: Intact   TESTS/PROCEDURES: MRI  D/C DAY/GOALS/PLACE: 2-3 days to TCU  OTHER IMPORTANT INFO:  Heparin gtt at 9ml/hr

## 2020-02-21 NOTE — CONSULTS
Care Transition Initial Assessment - RN  Met with: Patient.  DATA   Active Problems:    Sepsis (H)       Cognitive Status: alert and oriented.        Contact information and PCP information verified: Yes  Lives With: alone   Living Arrangements: house(Rambler style home with lower level )                 Insurance concerns: No Insurance issues identified  ASSESSMENT  Patient currently receives the following services:  None     Identified issues/concerns regarding health management:  Discussed discharge plans with patient. Patient stating he lives alone has been independent prior to admission however the last 2 weeks he has become significantly weak . Discussed therapy recommendations to ARU. Patient hesitant initially as he would have preferred returning to his home with   home care. Discussed the benefits of ARU and patient is in agreement. Information sent to ARU via dod. Patient voiced concerns regarding insurance coverage. Discussed that the liaison from  ARU will meet with patient.  PLAN  Financial costs for the patient include  None .  Patient given options and choices for discharge yes .  Patient/family is agreeable to the plan?  Yes:   Patient anticipates discharging to home .   Patient anticipates needs for home equipment: Yes  Transportation/person available to transport on day of discharge  is  [facility]  Plan/Disposition: ARF   Care  (CTS) will continue to follow as needed.

## 2020-02-21 NOTE — PROGRESS NOTES
Rehab Admissions:  Met with the pt at the request of care transitions who states therapies are recommending ARC placement. Educated the pt in benefits of ARC including intensive therapies, close medical management, and rehabilitative nursing care. The patient reports his primary goal is to return home from the hospital, however states he is open to initiating authorization through his insurance in case he is not safe and independent by the time he is medically ready to leave the hospital. Will initiate auth through his insurance. CM notified of content of conversation.     Thank you for the referral, we will continue to follow this patient for post acute placement.     Determination of admission is based upon the patient's need for an intensive, interdisciplinary approach to rehabilitation, their ability to progress, their ability to tolerate intensive therapies, their need for daily physician supervision, their need for twenty four hour nursing assistance, and their ability and willingness to participate in such a program.    Norman Butler CM  Rehab Liaison/  [facility]  2/21/2020    11:22 AM

## 2020-02-21 NOTE — PROGRESS NOTES
02/21/20 1000   Signing Clinician's Name / Credentials   Signing clinician's name / credentials Yarely Vasquez, KINA   Luna Balance Scale (LUNA K, MIRACLE S, FRANCISCA ROMO, GUEVARA DUMONT: MEASURING BALANCE IN THE ELDERLY: VALIDATION OF AN INSTRUMENT. CAN. J. PUB. HEALTH, JULY/AUGUST SUPPLEMENT 2:S7-11, 1992.)   Sit To Stand 3   Standing Unsupported 4   Sitting Unsupported 4   Stand to Sit 3   Transfers 2   Standing with Eyes Closed 3   Standing Unsupported, Feet Together 4   Reach Forward With Outstretched Arm 1   Retrieve Object From Floor 3   Turning to Look Behind 4   Turn 360 Degrees 1   Placing Alternate Foot on Stool (4-6 inches) 0   Unsupported Tandem Stand (Demonstrate to Subject) 1   One Leg Stand 0   Total Score (A score of 45 or less has been correlated with an increased risk of falls)   Total Score (out of 56) 33

## 2020-02-21 NOTE — PROGRESS NOTES
[facility]    Medicine Progress Note - Hospitalist Service        Date of Admission:  2/17/2020  3:26 PM    Assessment & Plan:   Anson Moise is a 64 years old male with PMH of HTN, ongoing RLE weakness and back pain and recently diagnosed DM2 presented to ER with generalized weakness. Noted to have severe dehydration with SANTHOSH, pseudohyponatremia, lactic acidosis and a new onset of A. fib RVR.       Chronic back pain  RLE weakness most likely due to diabetic neuropathy  Ongoing low back pain since around Thanksgiving.  Though back pain is improved, progressive right lower extremity noted.  Patient has fallen without any injuries since his right knee joel. Was evaluated by PCP and had MRI of the lumbar spine as outpatient 2/14. Shows mild anterior wedging subacute compression fracture of L1 with 25% loss of vertebral body height.  Minimal retropulsion of the posterior superior corner. Severity of leg weakness not explained by above lumbar spinal MRI findings, and also not by stroke findings on MRI brain.  -Orthospine consulted-, they do not feel that there is any neurocompression to explain for right leg weakness  -general neurology consulted EMG completed on 2/20 which was consistent with peripheral neuropathy, active denervation of right leg, MRI of the lumbosacral plexus did not show any acute findings.  Will await further recommendation from neurology  -Continue physical therapy, likely will need ARU at discharge    Acute Ischemic stroke  -MRI brain showed recent right temporal lobe infarct. CTA head and neck done, showed no vascular stenosis or occlusions but small saccular aneurysm projecting inferiorly off the left carotid siphon, 2.5 mm maximum diameter.  -Neurology consulted  -ARU at discharge.   - will stop heparin drip today and transition to Eliquis for anticoagulation.  -Started on fibrate given elevated TG.     SIRS:   -Tachycardic, marked leukocytosis, lactic acidosis and  hypotensive to 80s (but while in A fib RVR) without clear source of infection. CXR negative, UA unremarkable, ongoing back pain but has improved, no diarrhea or abdominal pain.  Afebrile.  Presumed SIRS due to severe dehydration due to osmotic diuresis from severe hyperglycemia.   -Blood cultures sent from ER, started on vancomycin and Zosyn on admission, cultures are negative so far, and no concern for infection and so IV antibiotics discontinued   -Lactic acidosis and leukocytosis resolved with IV hydration. BP stable.  -Orthospine eval given right leg weakness and back pain in the setting of SIRS, see below.     A. fib with RVR  HTN  Patient had an episode of A. fib RVR but then he converted to sinus with IV fluid resuscitation.  -TSH normal  -ECHO normal LVEF, no WMA.   -Cardiology consulted, started on metoprolol, lisinopril resumed. Recommended anticoagulation given [id number] score of 5 (age, HTN, DM2, stroke). Converted and currently sinus and rate controlled.  -Heparin drip to be transitioned to Eliquis this evening.    DM, type 2- presumed new diagnosis  -Blood sugar was noted to be more than 600 on routine labs as outpatient 5 days PTA.  Was started on metformin.  HbA1c is 11.5.   -continue Lantus at 18 units every morning, and continue aspart per carb counting. ISS.   -Resume prior to admission metformin extended release 500 mg daily, will increase to 1000 mg at discharge.    Acute kidney injury: Cr at presentation 1.33, normal baseline.  Patient was recently diagnosed with DM 2, was restarted on metformin.  Had blood sugar of up to 600 at the time of diagnosis.  Suspect ongoing hyperosmotic diuresis due to uncontrolled blood sugar, prerenal state and dehydration causing SANTHOSH.  -Cr normal with IV hydration  -UA without proteinuria     Hypokalemia  Pseudo-hyponatremia:   -Sodium 127 at admission expected, corrected 134 for blood sugar of 396.   -Normal with IV hydration.  -Replace K per  "protocol     Anemia, acute, mild.   Erosive gastritis.   Presented with hemoglobin of 17, noted same Hb level as outpatient last month.   -Noted drop in Hb down to 12 overnight after admission.  Patient does take low-dose ibuprofen daily but no hematochezia or melena. Was due for colonoscopy as outpatient, has never had one done prior.  - B12 folate normal stool for guaiac negative. No iron deficiency but given drop without other source, and needing AC, GI consulted, s/p EGD and colonoscopy, erosive gastropathy on EGD, 3 mm sigmoid rectal polyp on colonoscopy, resected  - OK to continue AC with PPI BID.     Diet: Moderate Consistent CHO Diet     DVT Prophylaxis: Heparin gtt  Chilel Catheter: not present  Code Status: Full Code     Disposition Plan    Expected discharge: 1-2 days, recommended to ARU  Entered: Ethan Cowart MD 02/21/2020, 9:04 AM        The patient's care was discussed with the Bedside Nurse and Patient.    Ethan Cowart MD  Hospitalist Service  [facility]    ______________________________________________________________________    Interval History   Right lower extremity weakness appears to be improving.  Denies new neurological changes.  No nausea or vomiting.  Blood sugars adequately controlled.    Data reviewed today: I reviewed all medications, new labs and imaging results over the last 24 hours. I personally reviewed no images or EKG's today.    Physical Exam   Vital signs:  Temp: 98.9  F (37.2  C) Temp src: Oral BP: (!) 156/81 Pulse: 62 Heart Rate: 64 Resp: 18 SpO2: 97 % O2 Device: None (Room air) Oxygen Delivery: 2 LPM Height: 175.3 cm (5' 9\") Weight: 79.7 kg (175 lb 12.8 oz)  Estimated body mass index is 25.96 kg/m  as calculated from the following:    Height as of this encounter: 1.753 m (5' 9\").    Weight as of this encounter: 79.7 kg (175 lb 12.8 oz).      Wt Readings from Last 2 Encounters:   02/18/20 79.7 kg (175 lb 12.8 oz)   09/14/06 91.6 kg (202 lb)       Gen: " AAOX3, NAD, comfortable  HEENT: no pallor  Resp: CTA B/L, normal WOB  CVS: RRR, no murmur  Abd/GI: Soft, non-tender. BS- normoactive.    Skin: Warm, dry no rashes  MSK: No joint deformities, no pedal edema  Neuro- CN- intact.  Right lower extremity strength 3/5      Data   Recent Labs   Lab 02/20/20  0813 02/19/20 2004 02/19/20  0455 02/18/20  2226  02/18/20  0526  02/17/20  1541   WBC  --   --  7.0  --   --  5.9  --  15.7*   HGB 12.9*  --  12.5* 12.5*   < > 12.9*  --  17.0   MCV  --   --  85  --   --  85  --  84     --  144*  --   --  146*   < > 208     --  138  --   --  140  --  127*   POTASSIUM 3.9 3.8 3.3*  --   --  3.6  --  3.7   CHLORIDE 109  --  107  --   --  110*  --  94   CO2 28  --  26  --   --  26  --  21   BUN 7  --  11  --   --  23  --  28   CR 0.55*  --  0.71  --   --  0.80  --  1.33*   ANIONGAP 4  --  5  --   --  4  --  12   SUZANNE 9.4  --  8.4*  --   --  8.1*  --  9.8   *  --  193*  --   --  188*  --  396*   ALBUMIN  --   --   --   --   --   --   --  3.9   PROTTOTAL  --   --   --   --   --   --   --  7.6   BILITOTAL  --   --   --   --   --   --   --  0.9   ALKPHOS  --   --   --   --   --   --   --  83   ALT  --   --   --   --   --   --   --  13   AST  --   --   --   --   --   --   --  9    < > = values in this interval not displayed.       Recent Results (from the past 24 hour(s))   MR Lumbosacral Plexus wwo Contrast    Narrative    EXAM: MR LUMBOSACRAL PLEXUS WWO CONTRAST  LOCATION: [facility]  DATE/TIME: 2/20/2020 7:43 PM    INDICATION: Right leg weakness.  COMPARISON: None.  TECHNIQUE: Routine multiplanar multisequence lumbosacral plexus MRI without intravenous contrast.    FINDINGS:  LUMBOSACRAL PLEXUS: Normal size and signal intensity of lumbosacral plexus components from the paraspinal regions through the sciatic notch bilaterally.     VISUALIZED SPINE: Diffuse bone marrow edema and loss of height along the superior endplate at the L1 level. Remainder of the  lumbar vertebra are grossly normal in height. Normal position of the conus medullaris. Normal cauda equina nerve roots. There is   nonspecific edema underlying the iliacus muscles bilaterally. No bone marrow edema underlying this.      Impression    IMPRESSION:  1.  Normal lumbosacral plexus MRI.  2.  Evidence of a recent mild superior endplate compression fracture at the L1 level.  3.  There is nonspecific edema coursing deep to the iliacus muscles bilaterally. No underlying bone marrow edema.       Medications     HEParin 900 Units/hr (02/20/20 1618)       fenofibrate  54 mg Oral Daily     fluticasone  2 spray Both Nostrils Daily     insulin aspart   Subcutaneous TID AC     insulin aspart  1-7 Units Subcutaneous TID AC     insulin aspart  1-5 Units Subcutaneous At Bedtime     insulin glargine  18 Units Subcutaneous QAM AC     lisinopril  5 mg Oral Daily     metoprolol tartrate  25 mg Oral BID     pantoprazole  40 mg Oral BID AC     senna-docusate  1 tablet Oral BID    Or     senna-docusate  2 tablet Oral BID     sodium chloride (PF)  3 mL Intracatheter Q8H

## 2020-02-21 NOTE — PLAN OF CARE
Discharge Planner OT   Patient plan for discharge: Home  Current status: Pt ambulated to the bathroom with CGA. Pt transferred to/from the toilet with SBA. While standing at the sink with SBA/CGA and set up, pt completed 2 grooming and hygiene tasks. Pt completed LE dressing with CGA and set up.   Barriers to return to prior living situation: Current level of assist for I/ADL, lives alone, cognition  Recommendations for discharge: Updated- ARU  Rationale for recommendations: Pt lives alone and is below baseline in I/ADLs and functional mobility. Anticipate pt will progress in established IP OT goal areas with continued therapy. If pt returns home, then increased assist in I/ADLs (home management tasks, transportation) and home OT. Anticipate pt will tolerate 3 hours of therapy daily.        Entered by: Adebayo Almanza 02/21/2020 9:50 AM

## 2020-02-21 NOTE — PLAN OF CARE
Cognitive Concerns/ Orientation : A&Ox4    BEHAVIOR & AGGRESSION TOOL COLOR: Green   CIWA SCORE: NA    ABNL VS/O2: VSS with BP in the 140-150's. On RA   MOBILITY: SBA   PAIN MANAGMENT: Denies   DIET: Mod-carb   BOWEL/BLADDER: WDL   ABNL LAB/BG:  for dinner.   DRAIN/DEVICES: 3 PIV. Heparin running at 900units/hr.   TELEMETRY RHYTHM: NSR   SKIN: WDL   TESTS/PROCEDURES: MRI completed late this evening.   D/C DAY/GOALS/PLACE: Pending workup   OTHER IMPORTANT INFO: Calls as needed.

## 2020-02-21 NOTE — PLAN OF CARE
Discharge Planner PT   Patient plan for discharge: Pt prefers going home and getting OP therapy vs ARU  Current status: Pt completed LUNA balance test scoring 33/56 indicating a fall risk. Pt educated on benefit of continued rehab vs returning home and receiving OP therapy. Sit to stands during session with SBA.   Barriers to return to prior living situation: fall risk, decreased strength, lives alone, decreased balance  Recommendations for discharge: ARU  Rationale for recommendations: Pt lives alone and is a fall risk as indicated by LUNA balance score. Pt would benefit from continued skilled therapy to improve balance and strength to progress in safety and mobility for functional mobility and be safe at home. Pt demonstrating ability to tolerate 3 hours of therapy needed at ARU.        Entered by: Yarely Vasquez 02/21/2020 1:22 PM

## 2020-02-22 ENCOUNTER — APPOINTMENT (OUTPATIENT)
Dept: PHYSICAL THERAPY | Facility: CLINIC | Age: 65
DRG: 064 | End: 2020-02-22
Payer: COMMERCIAL

## 2020-02-22 ENCOUNTER — APPOINTMENT (OUTPATIENT)
Dept: OCCUPATIONAL THERAPY | Facility: CLINIC | Age: 65
DRG: 064 | End: 2020-02-22
Payer: COMMERCIAL

## 2020-02-22 LAB
CREAT SERPL-MCNC: 0.64 MG/DL (ref 0.66–1.25)
GFR SERPL CREATININE-BSD FRML MDRD: >90 ML/MIN/{1.73_M2}
GLUCOSE BLDC GLUCOMTR-MCNC: 120 MG/DL (ref 70–99)
GLUCOSE BLDC GLUCOMTR-MCNC: 173 MG/DL (ref 70–99)
GLUCOSE BLDC GLUCOMTR-MCNC: 199 MG/DL (ref 70–99)
GLUCOSE BLDC GLUCOMTR-MCNC: 201 MG/DL (ref 70–99)
GLUCOSE BLDC GLUCOMTR-MCNC: 91 MG/DL (ref 70–99)

## 2020-02-22 PROCEDURE — 36415 COLL VENOUS BLD VENIPUNCTURE: CPT | Performed by: HOSPITALIST

## 2020-02-22 PROCEDURE — 25000131 ZZH RX MED GY IP 250 OP 636 PS 637: Performed by: HOSPITALIST

## 2020-02-22 PROCEDURE — 99232 SBSQ HOSP IP/OBS MODERATE 35: CPT | Performed by: INTERNAL MEDICINE

## 2020-02-22 PROCEDURE — 00000146 ZZHCL STATISTIC GLUCOSE BY METER IP

## 2020-02-22 PROCEDURE — 25000132 ZZH RX MED GY IP 250 OP 250 PS 637: Performed by: INTERNAL MEDICINE

## 2020-02-22 PROCEDURE — 97535 SELF CARE MNGMENT TRAINING: CPT | Mod: GO

## 2020-02-22 PROCEDURE — 97116 GAIT TRAINING THERAPY: CPT | Mod: GP

## 2020-02-22 PROCEDURE — 25000132 ZZH RX MED GY IP 250 OP 250 PS 637: Performed by: HOSPITALIST

## 2020-02-22 PROCEDURE — 82565 ASSAY OF CREATININE: CPT | Performed by: HOSPITALIST

## 2020-02-22 PROCEDURE — 12000000 ZZH R&B MED SURG/OB

## 2020-02-22 RX ORDER — HYDRALAZINE HYDROCHLORIDE 10 MG/1
10 TABLET, FILM COATED ORAL EVERY 6 HOURS PRN
Status: DISCONTINUED | OUTPATIENT
Start: 2020-02-22 | End: 2020-02-24 | Stop reason: HOSPADM

## 2020-02-22 RX ADMIN — APIXABAN 5 MG: 5 TABLET, FILM COATED ORAL at 20:15

## 2020-02-22 RX ADMIN — INSULIN ASPART 2 UNITS: 100 INJECTION, SOLUTION INTRAVENOUS; SUBCUTANEOUS at 18:00

## 2020-02-22 RX ADMIN — FENOFIBRATE 54 MG: 54 TABLET ORAL at 08:44

## 2020-02-22 RX ADMIN — PANTOPRAZOLE SODIUM 40 MG: 40 TABLET, DELAYED RELEASE ORAL at 16:25

## 2020-02-22 RX ADMIN — METOPROLOL TARTRATE 25 MG: 25 TABLET ORAL at 08:44

## 2020-02-22 RX ADMIN — METOPROLOL TARTRATE 25 MG: 25 TABLET ORAL at 20:15

## 2020-02-22 RX ADMIN — INSULIN ASPART 2 UNITS: 100 INJECTION, SOLUTION INTRAVENOUS; SUBCUTANEOUS at 13:11

## 2020-02-22 RX ADMIN — METFORMIN HYDROCHLORIDE 500 MG: 500 TABLET, EXTENDED RELEASE ORAL at 17:59

## 2020-02-22 RX ADMIN — APIXABAN 5 MG: 5 TABLET, FILM COATED ORAL at 08:44

## 2020-02-22 RX ADMIN — INSULIN GLARGINE 18 UNITS: 100 INJECTION, SOLUTION SUBCUTANEOUS at 09:34

## 2020-02-22 RX ADMIN — LISINOPRIL 5 MG: 5 TABLET ORAL at 08:44

## 2020-02-22 RX ADMIN — PANTOPRAZOLE SODIUM 40 MG: 40 TABLET, DELAYED RELEASE ORAL at 07:24

## 2020-02-22 RX ADMIN — FLUTICASONE PROPIONATE 2 SPRAY: 50 SPRAY, METERED NASAL at 08:47

## 2020-02-22 ASSESSMENT — ACTIVITIES OF DAILY LIVING (ADL)
ADLS_ACUITY_SCORE: 12
ADLS_ACUITY_SCORE: 14

## 2020-02-22 NOTE — PLAN OF CARE
Discharge Planner PT   Patient plan for discharge: ARU  Current status: Greeted patient sitting up in chair and agreeable to therapy. VSS on RA. Engaged patient in sit <> stand with single point cane at SBA. Engaged patient in ambulation with single point cane at CGA-SBA for a total distance of 450ft. Education provided on ambulation technique with single point cane. Educated patient on technique for stair negotiation. Engaged patient in ascending/descending 3+3+12 steps with single railing at CGA. Concluded session with patient sitting up in chair, all needs in reach, alarm on.   Barriers to return to prior living situation: high fall risk (LUNA 33/56), lives alone, decreased strength & activity tolerance  Recommendations for discharge: ARU  Rationale for recommendations: Patient demonstrates motivation for participating in therapy and has potential to regain prior level of function to baseline independence. Patient appropriate for ARU discharge and would benefit from intense physical therapy in order to improve functional mobility to baseline, increase lower extremity strength and improve balance for decreasing fall risk. Anticipate that patient will be able to tolerate 3hrs of therapy per day.          Entered by: Julienne Bell 02/22/2020 5:11 PM

## 2020-02-22 NOTE — PLAN OF CARE
DATE & TIME: 2/21/2020 [id number]    Cognitive Concerns/ Orientation : A+Ox4   BEHAVIOR & AGGRESSION TOOL COLOR: Green; calm & cooperative  CIWA SCORE: NA   ABNL VS/O2: RA  MOBILITY: Standby with cane  PAIN MANAGMENT: Denies  DIET: Mod carb  BOWEL/BLADDER: WDL  ABNL LAB/BG: WDL  DRAIN/DEVICES: None  TELEMETRY RHYTHM: NSR  SKIN: Intact  TESTS/PROCEDURES: OT/PT following  D/C DAY/GOALS/PLACE: Acute Rehab tomorrow(?)  OTHER IMPORTANT INFO: Continues with new diabetic teaching; needs more work on poking himself and injections.

## 2020-02-22 NOTE — PLAN OF CARE
Discharge Planner OT   Patient plan for discharge: Rehab  Current status: Pt sup>sit mod I, pt sat EOB to complete medication management tasks to assess safety w/prescribed medications upon discharge. Pt required cues to follow instructions on the bottle to correctly set up 2/3 medications using am/pm  Pill boxes. Pt able to set up 1x/day medications correctly, however, required extra cues to properly set up 2x/day medications   Barriers to return to prior living situation:Current level of assist for I/ADL, lives alone, cognition  Recommendations for discharge: Updated- ARU  Rationale for recommendations: Pt lives alone and is below baseline in I/ADLs and functional mobility. Anticipate pt will progress in established IP OT goal areas with continued therapy. If pt returns home, then increased assist in I/ADLs (home management tasks, transportation) and home OT. Anticipate pt will tolerate 3 hours of therapy daily.        Entered by: Rosanna Rivera 02/22/2020 9:00 AM

## 2020-02-22 NOTE — PROGRESS NOTES
SPIRITUAL HEALTH SERVICES Progress Note  [facility] 619-02    Visited pt per LOS. Pt described how he ended up at ER. Pt said that he lives and works in this area and pt's sister is his main support. Pt mentioned that his sister and another friend will come to visit him today and he will possibly go to rehab center at [place] tomorrow. Pt is Rastafari but doesn't have any Cheondo[employer] connection now, pt is aware of [facility], we will remain available for any future needs.      Josh Sol  Chaplain Resident

## 2020-02-22 NOTE — PROGRESS NOTES
[facility]  [facility] [place]  Neurology Daily Note      Admission Date:2/17/2020   Date of service: 02/22/2020   Hospital Day: 6                                                   Assessment and Plan:   #.  Right lower extremity weakness,most c/w diabetic amyotrophy. Other findings c/w generalized peripheral neuropathy--EMG c/w peripheral neuropathy, active denervation right leg  Mri pelvis/lumbar plexus reveals no signficant disease  Assuming that this is from diabetic amyotrophy, recovery is slow taking weeks to months with continued improvement even up to 18 to 24 months.  For this reason rehabilitation is necessary-will need walker for stability.  Determine if bracing would be helpful  Continue to optimize diabetes.  Anecdotally pulse high dose steroids weekly have been associated with improvement of diabetic amyotrophy.  Discussed with patient, as the diabetes has been labile and noticing improvement in strength/walking in last day, inclined to monitor for now and review after rehab.  Follow up with me as out patient in 3-4 weeks.      #.  Recent stroke related to atrial fibrillation  -- recommendations per stroke service/cardiology service regarding anticoagulation     #. DVT Prophylaxis  --Mechanical per hospitalist service  #. PT/OT/Speech  --Continue evaluations  #. Nutrition / GI Prophylaxis  --Per recommendations of speech therapy    Will sign off, please contact general neurology service if questions/need for follow up.      Interval History:   Right leg weakness considered better today, as able to walk better in PT with use of cane using right leg.       Review of Systems:   The Review of Systems is negative other than noted in the HPI       Medications:   Scheduled Meds:    apixaban ANTICOAGULANT  5 mg Oral BID     fenofibrate  54 mg Oral Daily     fluticasone  2 spray Both Nostrils Daily     insulin aspart   Subcutaneous TID AC     insulin aspart  1-7 Units Subcutaneous  TID AC     insulin aspart  1-5 Units Subcutaneous At Bedtime     insulin glargine  18 Units Subcutaneous QAM AC     lisinopril  5 mg Oral Daily     metFORMIN  500 mg Oral Daily with supper     metoprolol tartrate  25 mg Oral BID     pantoprazole  40 mg Oral BID AC     senna-docusate  1 tablet Oral BID    Or     senna-docusate  2 tablet Oral BID     sodium chloride (PF)  3 mL Intracatheter Q8H     PRN Meds: acetaminophen, glucose **OR** dextrose **OR** glucagon, lidocaine 4%, lidocaine (buffered or not buffered), melatonin, naloxone, nitroGLYcerin, ondansetron **OR** ondansetron, oxyCODONE-acetaminophen, potassium chloride, potassium chloride with lidocaine, potassium chloride, potassium chloride, sodium chloride (PF)        Physical Exam:   Vitals: Temp: 97.9  F (36.6  C) Temp src: Oral BP: (!) 174/80   Heart Rate: 62 Resp: 18 SpO2: 98 % O2 Device: None (Room air)    Vital Signs with Ranges: Temp:  [97.9  F (36.6  C)-98.9  F (37.2  C)] 97.9  F (36.6  C)  Heart Rate:  [62-76] 62  Resp:  [16-18] 18  BP: (130-174)/(69-80) 174/80  SpO2:  [94 %-99 %] 98 %    General Appearance:  No acute distress  Neuro:       Mental Status Exam:    Alert and oriented.  Language and speech intact       Cranial Nerves:   Vision/visual fields intact to finger counting.  Pupils are equal and reactive to light.  Extraocular movements intact.  Facial strength and sensation is normal.  No jaw or tongue deviation.  Shoulder elevation symmetrical.  Hearing intact.  Fundus: Technically difficult          Motor:   Dupuytren's contracture right hand.  Deltoid, triceps, biceps 5/5, digit extension 3/4, interossei 3/4, hip flexion 3-/5, hip abduction 5/5, hip abduction 5/5, quadriceps 3-/5, hamstring 3/5, dorsiflexion 3/5, inversion 3/5, eversion 3/5.  Decreased bulk of the right thigh, tone normal x4          Reflexes: Absent right knee and ankle, reduced left knee, absent left ankle, +1 both upper extremities       Sensory: Absent vibration sense  at the metatarsal joint and toe, reduced at the ankle, present in the hands.  Pinprick is subjective and inconsistently reduced in the right leg compared to the left                   Coordination:   Decreased coordination in the right leg consistent with weakness       Gait: stable w use of walker, puts majority of weight on left leg/  Neck: no nuchal rigidity, normal thyroid. No carotid bruits.    Cardiovascular: Irregular rate and rhythm, no m/r/g  Extremities: No clubbing, no cyanosis, no edema       Data:   ROUTINE IP LABS (Last 3results)  CBC RESULTS:     Recent Labs   Lab Test 02/20/20  0813 02/19/20 0455 02/18/20 2226 02/18/20  0526  02/17/20  1541   WBC  --  7.0  --   --  5.9  --  15.7*   RBC  --  4.34*  --   --  4.38*  --  5.76   HGB 12.9* 12.5* 12.5*   < > 12.9*  --  17.0   HCT 38.2* 36.9* 36.4*   < > 37.0*  --  48.2    144*  --   --  146*   < > 208    < > = values in this interval not displayed.     Basic Metabolic Panel:  Recent Labs   Lab Test 02/22/20  0942 02/21/20  0844 02/20/20  0813 02/19/20 2004 02/19/20 0455 02/18/20  0526   NA  --   --  141  --  138 140   POTASSIUM  --   --  3.9 3.8 3.3* 3.6   CHLORIDE  --   --  109  --  107 110*   CO2  --   --  28  --  26 26   BUN  --   --  7  --  11 23   CR 0.64* 0.60* 0.55*  --  0.71 0.80   GLC  --   --  190*  --  193* 188*   SUZANNE  --   --  9.4  --  8.4* 8.1*     Liver panel:  Recent Labs   Lab Test 02/17/20  1541 02/13/20  0110   PROTTOTAL 7.6 8.9*   ALBUMIN 3.9 4.6   BILITOTAL 0.9 0.4   ALKPHOS 83 122   AST 9 12   ALT 13 17     Lipid Profile:  Recent Labs   Lab Test 02/19/20 0455   CHOL 116   HDL 38*   LDL 46   TRIG 160*     Thyroid Panel:  Recent Labs   Lab Test 02/18/20  0526   TSH 0.96      Vitamin B12:   Recent Labs   Lab Test 02/18/20  0526   B12 212      A1C:   Recent Labs   Lab Test 02/17/20 2025   A1C 11.5*   CRP inflammation:   Recent Labs   Lab Test 02/18/20 0526   CRP 5.2     ESR:   Recent Labs   Lab Test 02/18/20 0526   SED 8             IMAGING:   All imaging studies were reviewed personally  Mri pelvis reviewed.          TIME     15minutes Evaluation/managment time.

## 2020-02-22 NOTE — PLAN OF CARE
"DATE & TIME: 2/22/2020  7 am-7 pm  Cognitive Concerns/ Orientation : Alert and Oriented x4   BEHAVIOR & AGGRESSION TOOL COLOR: Green   CIWA SCORE: NA    ABNL VS/O2: VSS on RA except hypertensive BP 150s/90s  MOBILITY: SBA with cane, ambulated in the [place] with nursing staff. Reports some weakness in RLE. Otherwise, pt states \"walking feels better\".   PAIN MANAGMENT: Denies   DIET: Mod carb diet, tolerating, carb count  BOWEL/BLADDER: Continent B/B. Reports two BMs today, soft, colonoscopy done on 2/19/2020  ABNL LAB/BG:  /199/201. On lantus and sliding scale/carb count. Metformin for bed time  DRAIN/DEVICES: PIV SL, was started on PO Eliquis yesterday evening.   TELEMETRY RHYTHM: NSR   SKIN: Intact   TESTS/PROCEDURES: n/a  D/C DAY/GOALS/PLACE:ARU is recommended; neurology is following. Probably tomorrow ones bed is available. PT/OT are following.   OTHER IMPORTANT INFO:   "

## 2020-02-22 NOTE — PLAN OF CARE
DATE & TIME: 2/21/20, 3630 - 6101   Cognitive Concerns/ Orientation : A&O x 4   BEHAVIOR & AGGRESSION TOOL COLOR: Green  CIWA SCORE: N/a   ABNL VS/O2: VSS on room air  MOBILITY: SBA, GB and cane  PAIN MANAGMENT: Denied  DIET: Mod CHO  BOWEL/BLADDER: Continent B/B  ABNL LAB/BG: N/a  DRAIN/DEVICES: PIV SL  TELEMETRY RHYTHM: NSR  SKIN: Intact  TESTS/PROCEDURES: N/a  D/C DAY/GOALS/PLACE: Possible discharge today

## 2020-02-22 NOTE — PROGRESS NOTES
[facility]    Medicine Progress Note - Hospitalist Service        Date of Admission:  2/17/2020  3:26 PM    Assessment & Plan:   Anson Moise is a 64 years old male with PMH of HTN, ongoing RLE weakness and back pain and recently diagnosed DM2 presented to ER with generalized weakness. Noted to have severe dehydration with SANTHOSH, pseudohyponatremia, lactic acidosis and a new onset of A. fib RVR.       Chronic back pain  RLE weakness most likely due to diabetic neuropathy  Ongoing low back pain since around Thanksgiving.  Though back pain is improved, progressive right lower extremity noted.  Patient has fallen without any injuries since his right knee joel. Was evaluated by PCP and had MRI of the lumbar spine as outpatient 2/14. Shows mild anterior wedging subacute compression fracture of L1 with 25% loss of vertebral body height.  Minimal retropulsion of the posterior superior corner. Severity of leg weakness not explained by above lumbar spinal MRI findings, and also not by stroke findings on MRI brain.  -Orthospine consulted, they do not feel that there is any neurocompression to explain for right leg weakness  -general neurology consulted; EMG completed on 2/20 which was consistent with peripheral neuropathy, active denervation of right leg, MRI of the lumbosacral plexus did not show any acute findings.    -Neurology discussed pulse high-dose steroids for diabetic amyotrophy, since he has been noticing improvement in strength he is more inclined to monitor for now and defer steroid.  -Continue physical therapy, likely will need ARU at discharge  -Outpatient neurology follow-up with Dr. Pearce in 3 to 4 weeks    Acute Ischemic stroke  -MRI brain showed recent right temporal lobe infarct. CTA head and neck done, showed no vascular stenosis or occlusions but small saccular aneurysm projecting inferiorly off the left carotid siphon, 2.5 mm maximum diameter.  -Neurology consulted  -ARU at  discharge.   - will stop heparin drip today and transition to Eliquis for anticoagulation.  -Started on fibrate given elevated TG.     SIRS:   -Tachycardic, marked leukocytosis, lactic acidosis and hypotensive to 80s (but while in A fib RVR) without clear source of infection. CXR negative, UA unremarkable, ongoing back pain but has improved, no diarrhea or abdominal pain.  Afebrile.  Presumed SIRS due to severe dehydration due to osmotic diuresis from severe hyperglycemia.   -Blood cultures sent from ER, started on vancomycin and Zosyn on admission, cultures are negative so far, and no concern for infection and so IV antibiotics discontinued   -Lactic acidosis and leukocytosis resolved with IV hydration. BP stable.     A. fib with RVR  HTN  Patient had an episode of A. fib RVR but then he converted to sinus with IV fluid resuscitation.  -TSH normal  -ECHO normal LVEF, no WMA.   -Cardiology consulted, started on metoprolol, lisinopril resumed. Recommended anticoagulation given [id number] score of 5 (age, HTN, DM2, stroke). Converted and currently sinus and rate controlled.  -Continue Eliquis    DM, type 2- presumed new diagnosis  -Blood sugar was noted to be more than 600 on routine labs as outpatient 5 days PTA.  Was started on metformin.  HbA1c is 11.5.   -continue Lantus at 18 units every morning, and continue aspart per carb counting. ISS.   -Continue prior to admission metformin extended release 500 mg daily, will increase to 1000 mg at discharge.    Acute kidney injury: Cr at presentation 1.33, normal baseline.  Patient was recently diagnosed with DM 2, was restarted on metformin.  Had blood sugar of up to 600 at the time of diagnosis.  Suspect ongoing hyperosmotic diuresis due to uncontrolled blood sugar, prerenal state and dehydration causing SANTHOSH.  -Cr normal with IV hydration  -UA without proteinuria     Hypokalemia  Pseudo-hyponatremia:   -Sodium 127 at admission expected, corrected 134 for blood sugar of  "396.   -Normal with IV hydration.  -Replace K per protocol     Anemia, acute, mild.   Erosive gastritis.   Presented with hemoglobin of 17, noted same Hb level as outpatient last month.   -Noted drop in Hb down to 12 overnight after admission.  Patient does take low-dose ibuprofen daily but no hematochezia or melena. Was due for colonoscopy as outpatient, has never had one done prior.  - B12 folate normal stool for guaiac negative. No iron deficiency but given drop without other source, and needing AC, GI consulted, s/p EGD and colonoscopy, erosive gastropathy on EGD, 3 mm sigmoid rectal polyp on colonoscopy, resected  - OK to continue AC with PPI BID.     Diet: Moderate Consistent CHO Diet     DVT Prophylaxis: Heparin gtt  Chilel Catheter: not present  Code Status: Full Code     Disposition Plan    Expected discharge: Likely tomorrow, to ARU  Entered: Ethan Cowart MD 02/22/2020, 10:33 AM        The patient's care was discussed with the Bedside Nurse and Patient.    Ethan Cowart MD  Hospitalist Service  [facility]    ______________________________________________________________________    Interval History   Pt mentions that he continues to have small incremental improvement in right lower extremity weakness.  But sugars acceptable for the most part.  Denies new complaints.    Data reviewed today: I reviewed all medications, new labs and imaging results over the last 24 hours. I personally reviewed no images or EKG's today.    Physical Exam   Vital signs:  Temp: 98.5  F (36.9  C) Temp src: Oral BP: (!) 155/78   Heart Rate: 68 Resp: 18 SpO2: 94 % O2 Device: None (Room air) Oxygen Delivery: 2 LPM Height: 175.3 cm (5' 9\") Weight: 79.7 kg (175 lb 12.8 oz)  Estimated body mass index is 25.96 kg/m  as calculated from the following:    Height as of this encounter: 1.753 m (5' 9\").    Weight as of this encounter: 79.7 kg (175 lb 12.8 oz).      Wt Readings from Last 2 Encounters:   02/18/20 79.7 kg " (175 lb 12.8 oz)   09/14/06 91.6 kg (202 lb)       Gen: AAOX3, NAD, comfortable  HEENT: no pallor  Resp: CTA B/L, normal WOB  CVS: RRR, no murmur  Abd/GI: Soft, non-tender. BS- normoactive.    Skin: Warm, dry no rashes  MSK: No joint deformities, no pedal edema  Neuro- CN- intact.  Right lower extremity strength 3/5      Data   Recent Labs   Lab 02/22/20  0942 02/21/20  0844 02/20/20  0813 02/19/20 2004 02/19/20  0455 02/18/20  2226  02/18/20  0526  02/17/20  1541   WBC  --   --   --   --  7.0  --   --  5.9  --  15.7*   HGB  --   --  12.9*  --  12.5* 12.5*   < > 12.9*  --  17.0   MCV  --   --   --   --  85  --   --  85  --  84   PLT  --   --  154  --  144*  --   --  146*   < > 208   NA  --   --  141  --  138  --   --  140  --  127*   POTASSIUM  --   --  3.9 3.8 3.3*  --   --  3.6  --  3.7   CHLORIDE  --   --  109  --  107  --   --  110*  --  94   CO2  --   --  28  --  26  --   --  26  --  21   BUN  --   --  7  --  11  --   --  23  --  28   CR 0.64* 0.60* 0.55*  --  0.71  --   --  0.80  --  1.33*   ANIONGAP  --   --  4  --  5  --   --  4  --  12   SUZANNE  --   --  9.4  --  8.4*  --   --  8.1*  --  9.8   GLC  --   --  190*  --  193*  --   --  188*  --  396*   ALBUMIN  --   --   --   --   --   --   --   --   --  3.9   PROTTOTAL  --   --   --   --   --   --   --   --   --  7.6   BILITOTAL  --   --   --   --   --   --   --   --   --  0.9   ALKPHOS  --   --   --   --   --   --   --   --   --  83   ALT  --   --   --   --   --   --   --   --   --  13   AST  --   --   --   --   --   --   --   --   --  9    < > = values in this interval not displayed.       No results found for this or any previous visit (from the past 24 hour(s)).  Medications       apixaban ANTICOAGULANT  5 mg Oral BID     fenofibrate  54 mg Oral Daily     fluticasone  2 spray Both Nostrils Daily     insulin aspart   Subcutaneous TID AC     insulin aspart  1-7 Units Subcutaneous TID AC     insulin aspart  1-5 Units Subcutaneous At Bedtime     insulin glargine   18 Units Subcutaneous QAM AC     lisinopril  5 mg Oral Daily     metFORMIN  500 mg Oral Daily with supper     metoprolol tartrate  25 mg Oral BID     pantoprazole  40 mg Oral BID AC     senna-docusate  1 tablet Oral BID    Or     senna-docusate  2 tablet Oral BID     sodium chloride (PF)  3 mL Intracatheter Q8H

## 2020-02-23 ENCOUNTER — APPOINTMENT (OUTPATIENT)
Dept: OCCUPATIONAL THERAPY | Facility: CLINIC | Age: 65
DRG: 064 | End: 2020-02-23
Payer: COMMERCIAL

## 2020-02-23 ENCOUNTER — APPOINTMENT (OUTPATIENT)
Dept: PHYSICAL THERAPY | Facility: CLINIC | Age: 65
DRG: 064 | End: 2020-02-23
Payer: COMMERCIAL

## 2020-02-23 LAB
BACTERIA SPEC CULT: NO GROWTH
BACTERIA SPEC CULT: NO GROWTH
CREAT SERPL-MCNC: 0.67 MG/DL (ref 0.66–1.25)
GFR SERPL CREATININE-BSD FRML MDRD: >90 ML/MIN/{1.73_M2}
GLUCOSE BLDC GLUCOMTR-MCNC: 104 MG/DL (ref 70–99)
GLUCOSE BLDC GLUCOMTR-MCNC: 136 MG/DL (ref 70–99)
GLUCOSE BLDC GLUCOMTR-MCNC: 160 MG/DL (ref 70–99)
GLUCOSE BLDC GLUCOMTR-MCNC: 163 MG/DL (ref 70–99)
GLUCOSE BLDC GLUCOMTR-MCNC: 98 MG/DL (ref 70–99)
GLUCOSE SERPL-MCNC: 243 MG/DL (ref 70–99)
SPECIMEN SOURCE: NORMAL
SPECIMEN SOURCE: NORMAL

## 2020-02-23 PROCEDURE — 25000131 ZZH RX MED GY IP 250 OP 636 PS 637: Performed by: HOSPITALIST

## 2020-02-23 PROCEDURE — 97116 GAIT TRAINING THERAPY: CPT | Mod: GP

## 2020-02-23 PROCEDURE — 99207 ZZC CDG-MDM COMPONENT: MEETS MODERATE - UP CODED: CPT | Performed by: INTERNAL MEDICINE

## 2020-02-23 PROCEDURE — 00000146 ZZHCL STATISTIC GLUCOSE BY METER IP

## 2020-02-23 PROCEDURE — 97530 THERAPEUTIC ACTIVITIES: CPT | Mod: GP

## 2020-02-23 PROCEDURE — 12000000 ZZH R&B MED SURG/OB

## 2020-02-23 PROCEDURE — 82565 ASSAY OF CREATININE: CPT | Performed by: HOSPITALIST

## 2020-02-23 PROCEDURE — 36415 COLL VENOUS BLD VENIPUNCTURE: CPT | Performed by: HOSPITALIST

## 2020-02-23 PROCEDURE — 99232 SBSQ HOSP IP/OBS MODERATE 35: CPT | Performed by: INTERNAL MEDICINE

## 2020-02-23 PROCEDURE — 82947 ASSAY GLUCOSE BLOOD QUANT: CPT | Performed by: HOSPITALIST

## 2020-02-23 PROCEDURE — 25000132 ZZH RX MED GY IP 250 OP 250 PS 637: Performed by: HOSPITALIST

## 2020-02-23 PROCEDURE — 25000132 ZZH RX MED GY IP 250 OP 250 PS 637: Performed by: INTERNAL MEDICINE

## 2020-02-23 PROCEDURE — 97530 THERAPEUTIC ACTIVITIES: CPT | Mod: GO

## 2020-02-23 RX ORDER — LISINOPRIL 20 MG/1
20 TABLET ORAL DAILY
Status: DISCONTINUED | OUTPATIENT
Start: 2020-02-23 | End: 2020-02-24 | Stop reason: HOSPADM

## 2020-02-23 RX ORDER — PANTOPRAZOLE SODIUM 40 MG/1
40 TABLET, DELAYED RELEASE ORAL
Qty: 60 TABLET | Refills: 0 | Status: SHIPPED | OUTPATIENT
Start: 2020-02-23

## 2020-02-23 RX ORDER — METFORMIN HCL 500 MG
1000 TABLET, EXTENDED RELEASE 24 HR ORAL
Qty: 30 TABLET | Refills: 0 | Status: SHIPPED | OUTPATIENT
Start: 2020-02-23 | End: 2020-02-24

## 2020-02-23 RX ORDER — HYDROCHLOROTHIAZIDE 25 MG/1
25 TABLET ORAL DAILY
Status: DISCONTINUED | OUTPATIENT
Start: 2020-02-23 | End: 2020-02-24 | Stop reason: HOSPADM

## 2020-02-23 RX ADMIN — APIXABAN 5 MG: 5 TABLET, FILM COATED ORAL at 22:10

## 2020-02-23 RX ADMIN — PANTOPRAZOLE SODIUM 40 MG: 40 TABLET, DELAYED RELEASE ORAL at 16:32

## 2020-02-23 RX ADMIN — METFORMIN HYDROCHLORIDE 500 MG: 500 TABLET, EXTENDED RELEASE ORAL at 17:48

## 2020-02-23 RX ADMIN — HYDROCHLOROTHIAZIDE 25 MG: 25 TABLET ORAL at 10:27

## 2020-02-23 RX ADMIN — LISINOPRIL 20 MG: 20 TABLET ORAL at 10:27

## 2020-02-23 RX ADMIN — METOPROLOL TARTRATE 25 MG: 25 TABLET ORAL at 08:25

## 2020-02-23 RX ADMIN — FLUTICASONE PROPIONATE 2 SPRAY: 50 SPRAY, METERED NASAL at 08:29

## 2020-02-23 RX ADMIN — INSULIN ASPART 1 UNITS: 100 INJECTION, SOLUTION INTRAVENOUS; SUBCUTANEOUS at 17:48

## 2020-02-23 RX ADMIN — FENOFIBRATE 54 MG: 54 TABLET ORAL at 08:25

## 2020-02-23 RX ADMIN — INSULIN GLARGINE 18 UNITS: 100 INJECTION, SOLUTION SUBCUTANEOUS at 08:29

## 2020-02-23 RX ADMIN — APIXABAN 5 MG: 5 TABLET, FILM COATED ORAL at 08:25

## 2020-02-23 RX ADMIN — LISINOPRIL 5 MG: 5 TABLET ORAL at 08:25

## 2020-02-23 RX ADMIN — PANTOPRAZOLE SODIUM 40 MG: 40 TABLET, DELAYED RELEASE ORAL at 06:31

## 2020-02-23 ASSESSMENT — ACTIVITIES OF DAILY LIVING (ADL)
ADLS_ACUITY_SCORE: 12

## 2020-02-23 NOTE — PLAN OF CARE
DATE & TIME: 2/23/2020  7 am-7 pm  Cognitive Concerns/ Orientation : Alert and Oriented x4   BEHAVIOR & AGGRESSION TOOL COLOR: Green   CIWA SCORE: NA    ABNL VS/O2: VSS on RA except hypertensive; highest BP 160s/60s, started on Hydrochlorothiazide and dose of lisinopril was increased to 20 mg from 5 mg. Metoprolol stopped.    MOBILITY: SBA with cane when ambulating in corado, Independent in the room. Ambulated in the corado with nursing staff and therapy. RLE weakness continues to gradually improve   PAIN MANAGMENT: Denies   DIET: Mod carb diet, tolerating; carb count  BOWEL/BLADDER: Continent B/B. Reports one BMs today, stool is getting softer, colonoscopy done on 2/19/2020  ABNL LAB/BG:  /136/163. On lantus and sliding scale/carb count. Metformin for bed time  DRAIN/DEVICES: no PIV  TELEMETRY RHYTHM: tele d/c  SKIN: Intact with some bruising.   TESTS/PROCEDURES: n/a  D/C DAY/GOALS/PLACE:Home tomorrow with home care, once referrals are sent. PT/OT are following.   OTHER IMPORTANT INFO: Continue to educate pt on insuline injection administration.

## 2020-02-23 NOTE — PLAN OF CARE
Discharge Planner PT   Patient plan for discharge: reports wants to go home, reports would be interested in home care.   Current status: IND with bed mobility. IND with EOB to recliner. IND with sit to stand with and without his single point cane.  IND with static standing balance without assistive device. Mod IND with SPC in hallway 400ft, no LOB with head turns, turning, amb backwards x 8ft. Up/down 1 flight of stairs utilizing side stepping method with right railing, IND. Pt was able to relocate his room without difficulty. No knee buckling noted during encounter. Pt able to demonstrate IND with functional reaching in his room at multiple heights, appropriately holding onto furniture in the room for increased safety as needed, also pt sat in the chair to  an object off the ground for increased safety. Good tolerance with mobility during encounter.    Barriers to return to prior living situation: none anticipated from a mobility standpoint.   Recommendations for discharge: home with continued use of his cane for balance, home PT,OT and RN.   Rationale for recommendations: Pt currently mobilizing mod INDly with his cane for balance due to ongoing right LE weakness. Pt would recommend home care for continued right LE strengthening.        Entered by: Lee Ann Mayberry 02/23/2020 3:16 PM

## 2020-02-23 NOTE — PROVIDER NOTIFICATION
MD Notification    Notified Person: MD    Notified Person Name: Dr. Martin    Notification Date/Time: 2/22 2110    Notification Interaction: amcom page    Purpose of Notification: 619-2 P.AFUA GOEL pts /89. given scheduled metoprolol, now down to 157/93.    *[id number] BRITNEY Cespedes    Orders Received: PRN hyralazine    Comments:

## 2020-02-23 NOTE — PROGRESS NOTES
[facility]    Medicine Progress Note - Hospitalist Service        Date of Admission:  2/17/2020  3:26 PM    Assessment & Plan:   Anson Moise is a 64 years old male with PMH of HTN, ongoing RLE weakness and back pain and recently diagnosed DM2 presented to ER with generalized weakness. Noted to have severe dehydration with SANTHOSH, pseudohyponatremia, lactic acidosis and a new onset of A. fib RVR.       Chronic back pain  RLE weakness most likely due to diabetic neuropathy  Ongoing low back pain since around Thanksgiving.  Though back pain is improved, progressive right lower extremity noted.  Patient has fallen without any injuries since his right knee joel. Was evaluated by PCP and had MRI of the lumbar spine as outpatient 2/14. Shows mild anterior wedging subacute compression fracture of L1 with 25% loss of vertebral body height.  Minimal retropulsion of the posterior superior corner. Severity of leg weakness not explained by above lumbar spinal MRI findings, and also not by stroke findings on MRI brain.  -Orthospine consulted, they do not feel that there is any neurocompression to explain for right leg weakness  -general neurology consulted; EMG completed on 2/20 which was consistent with peripheral neuropathy, active denervation of right leg, MRI of the lumbosacral plexus did not show any acute findings.    -Neurology discussed pulse high-dose steroids for diabetic amyotrophy, since he has been noticing improvement in strength he is more inclined to monitor for now and defer steroid.  -Continue physical therapy, will need ARU at discharge.  Awaiting prior authorization  -Outpatient neurology follow-up with Dr. Pearce in 3 to 4 weeks    Acute Ischemic stroke  -MRI brain showed recent right temporal lobe infarct. CTA head and neck done, showed no vascular stenosis or occlusions but small saccular aneurysm projecting inferiorly off the left carotid siphon, 2.5 mm maximum diameter.  -Neurology  consulted  -ARU at discharge.   -Continue Eliquis  -Started on fibrate given elevated TG.     SIRS:   -Tachycardic, marked leukocytosis, lactic acidosis and hypotensive to 80s (but while in A fib RVR) without clear source of infection. CXR negative, UA unremarkable, ongoing back pain but has improved, no diarrhea or abdominal pain.  Afebrile.  Presumed SIRS due to severe dehydration due to osmotic diuresis from severe hyperglycemia.   -Blood cultures sent from ER, started on vancomycin and Zosyn on admission, cultures are negative so far, and no concern for infection and so IV antibiotics discontinued   -Lactic acidosis and leukocytosis resolved with IV hydration. BP stable.     A. fib with RVR  HTN  Patient had an episode of A. fib RVR but then he converted to sinus with IV fluid resuscitation.  -TSH normal  -ECHO normal LVEF, no WMA.   -Cardiology consulted, started on metoprolol, lisinopril resumed. Recommended anticoagulation given [id number] score of 5 (age, HTN, DM2, stroke). Converted and currently sinus and rate controlled.  -Continue Eliquis    DM, type 2- presumed new diagnosis  -Blood sugar was noted to be more than 600 on routine labs as outpatient 5 days PTA.  Was started on metformin.  HbA1c is 11.5.   -continue Lantus at 18 units every morning, and continue aspart per carb counting. ISS.   -Continue prior to admission metformin extended release 500 mg daily, will increase to 1000 mg at discharge.    Acute kidney injury: Cr at presentation 1.33, normal baseline.  Patient was recently diagnosed with DM 2, was restarted on metformin.  Had blood sugar of up to 600 at the time of diagnosis.  Suspect ongoing hyperosmotic diuresis due to uncontrolled blood sugar, prerenal state and dehydration causing SANTHOSH.  -Cr normal with IV hydration  -UA without proteinuria     Hypokalemia  Pseudo-hyponatremia:   -Sodium 127 at admission expected, corrected 134 for blood sugar of 396.   -Normal with IV  "hydration.  -Replace K per protocol     Anemia, acute, mild.   Erosive gastritis.   Presented with hemoglobin of 17, noted same Hb level as outpatient last month.   -Noted drop in Hb down to 12 overnight after admission.  Patient does take low-dose ibuprofen daily but no hematochezia or melena. Was due for colonoscopy as outpatient, has never had one done prior.  - B12 folate normal stool for guaiac negative. No iron deficiency but given drop without other source, and needing AC, GI consulted, s/p EGD and colonoscopy, erosive gastropathy on EGD, 3 mm sigmoid rectal polyp on colonoscopy, resected  - OK to continue AC with PPI BID.     Diet: Moderate Consistent CHO Diet     DVT Prophylaxis: Heparin gtt  Chilel Catheter: not present  Code Status: Full Code     Disposition Plan    Expected discharge: Likely tomorrow, to ARU  Entered: Ethan Cowart MD 02/23/2020, 12:34 PM        The patient's care was discussed with the Bedside Nurse and Patient.    Ethan Cowart MD  Hospitalist Service  [facility]    ______________________________________________________________________    Interval History   Right lower extremity weakness continues to gradually improve.  Denies new complaints.  No nausea vomiting.  No new neurological change.    Data reviewed today: I reviewed all medications, new labs and imaging results over the last 24 hours. I personally reviewed no images or EKG's today.    Physical Exam   Vital signs:  Temp: 98.3  F (36.8  C) Temp src: Oral BP: (!) 144/82   Heart Rate: 60 Resp: 18 SpO2: 98 % O2 Device: None (Room air) Oxygen Delivery: 2 LPM Height: 175.3 cm (5' 9\") Weight: 79.7 kg (175 lb 12.8 oz)  Estimated body mass index is 25.96 kg/m  as calculated from the following:    Height as of this encounter: 1.753 m (5' 9\").    Weight as of this encounter: 79.7 kg (175 lb 12.8 oz).      Wt Readings from Last 2 Encounters:   02/18/20 79.7 kg (175 lb 12.8 oz)   09/14/06 91.6 kg (202 lb)       Gen: " AAOX3, NAD, comfortable  Resp: CTA B/L, normal WOB  CVS: RRR, no murmur  Abd/GI: Soft, non-tender. BS- normoactive.    Skin: Warm, dry no rashes  MSK: No joint deformities, no pedal edema  Neuro- CN- intact.  Right lower extremity strength 3+/5      Data   Recent Labs   Lab 02/23/20  0837 02/22/20  0942 02/21/20  0844 02/20/20  0813 02/19/20 2004 02/19/20  0455 02/18/20 2226  02/18/20  0526  02/17/20  1541   WBC  --   --   --   --   --  7.0  --   --  5.9  --  15.7*   HGB  --   --   --  12.9*  --  12.5* 12.5*   < > 12.9*  --  17.0   MCV  --   --   --   --   --  85  --   --  85  --  84   PLT  --   --   --  154  --  144*  --   --  146*   < > 208   NA  --   --   --  141  --  138  --   --  140  --  127*   POTASSIUM  --   --   --  3.9 3.8 3.3*  --   --  3.6  --  3.7   CHLORIDE  --   --   --  109  --  107  --   --  110*  --  94   CO2  --   --   --  28  --  26  --   --  26  --  21   BUN  --   --   --  7  --  11  --   --  23  --  28   CR 0.67 0.64* 0.60* 0.55*  --  0.71  --   --  0.80  --  1.33*   ANIONGAP  --   --   --  4  --  5  --   --  4  --  12   SUZANNE  --   --   --  9.4  --  8.4*  --   --  8.1*  --  9.8   *  --   --  190*  --  193*  --   --  188*  --  396*   ALBUMIN  --   --   --   --   --   --   --   --   --   --  3.9   PROTTOTAL  --   --   --   --   --   --   --   --   --   --  7.6   BILITOTAL  --   --   --   --   --   --   --   --   --   --  0.9   ALKPHOS  --   --   --   --   --   --   --   --   --   --  83   ALT  --   --   --   --   --   --   --   --   --   --  13   AST  --   --   --   --   --   --   --   --   --   --  9    < > = values in this interval not displayed.       No results found for this or any previous visit (from the past 24 hour(s)).  Medications       apixaban ANTICOAGULANT  5 mg Oral BID     fenofibrate  54 mg Oral Daily     fluticasone  2 spray Both Nostrils Daily     hydrochlorothiazide  25 mg Oral Daily     insulin aspart   Subcutaneous TID AC     insulin aspart  1-7 Units Subcutaneous  TID AC     insulin aspart  1-5 Units Subcutaneous At Bedtime     insulin glargine  18 Units Subcutaneous QAM AC     lisinopril  20 mg Oral Daily     metFORMIN  500 mg Oral Daily with supper     pantoprazole  40 mg Oral BID AC     senna-docusate  1 tablet Oral BID    Or     senna-docusate  2 tablet Oral BID     sodium chloride (PF)  3 mL Intracatheter Q8H

## 2020-02-23 NOTE — PLAN OF CARE
DATE & TIME: 2/22/2020 nights  Cognitive Concerns/ Orientation : Alert and Oriented x4   BEHAVIOR & AGGRESSION TOOL COLOR: Green   CIWA SCORE: NA    ABNL VS/O2: VSS on RA except hypertensive BP 150s/90s; PRN hydralazine ordered for SBP >180  MOBILITY: SBA with cane, ambulated in the corado with nursing staff. Reports some weakness in RLE.   PAIN MANAGMENT: Denies   DIET: Mod carb diet, tolerating, carb count  BOWEL/BLADDER: Continent B/B. colonoscopy done on 2/19/2020  ABNL LAB/BG:  , 90. On lantus and sliding scale/carb count. Metformin for bed time  DRAIN/DEVICES: PIV SL, was started on PO Eliquis yesterday evening.   TELEMETRY RHYTHM: NSR   SKIN: Intact   TESTS/PROCEDURES: n/a  D/C DAY/GOALS/PLACE:ARU is recommended; neurology is following. Probably tomorrow ones bed is available. PT/OT are following.   OTHER IMPORTANT INFO:

## 2020-02-23 NOTE — PLAN OF CARE
Discharge Planner OT    Patient plan for discharge: ARC  Current status: Pt Sit<>stand from chair SBA w/SEC. Pt declining g/h tasks as he already completed g/h for the morning.  Toileting SBA, Pt ambulated in hallway ~150 ft w/SEC and SBA.   Barriers to return to prior living situation: Current level of assist for I/ADL, lives alone, cognition  Recommendations for discharge: Updated- ARU  Rationale for recommendations: Pt lives alone and is below baseline in I/ADLs and functional mobility. Anticipate pt will progress in established IP OT goal areas with continued therapy. If pt returns home, then increased assist in I/ADLs (home management tasks, transportation) and home OT. Anticipate pt will tolerate 3 hours of therapy daily.        Entered by: Rosanna Rivera 02/23/2020 10:49 AM

## 2020-02-24 ENCOUNTER — APPOINTMENT (OUTPATIENT)
Dept: OCCUPATIONAL THERAPY | Facility: CLINIC | Age: 65
DRG: 064 | End: 2020-02-24
Payer: COMMERCIAL

## 2020-02-24 VITALS
HEART RATE: 62 BPM | BODY MASS INDEX: 26.04 KG/M2 | DIASTOLIC BLOOD PRESSURE: 66 MMHG | SYSTOLIC BLOOD PRESSURE: 145 MMHG | WEIGHT: 175.8 LBS | HEIGHT: 69 IN | OXYGEN SATURATION: 94 % | RESPIRATION RATE: 18 BRPM | TEMPERATURE: 98.3 F

## 2020-02-24 LAB
CREAT SERPL-MCNC: 0.71 MG/DL (ref 0.66–1.25)
GFR SERPL CREATININE-BSD FRML MDRD: >90 ML/MIN/{1.73_M2}
GLUCOSE BLDC GLUCOMTR-MCNC: 101 MG/DL (ref 70–99)
GLUCOSE BLDC GLUCOMTR-MCNC: 114 MG/DL (ref 70–99)

## 2020-02-24 PROCEDURE — 25000132 ZZH RX MED GY IP 250 OP 250 PS 637: Performed by: INTERNAL MEDICINE

## 2020-02-24 PROCEDURE — 25000131 ZZH RX MED GY IP 250 OP 636 PS 637: Performed by: HOSPITALIST

## 2020-02-24 PROCEDURE — 25000132 ZZH RX MED GY IP 250 OP 250 PS 637: Performed by: HOSPITALIST

## 2020-02-24 PROCEDURE — 36415 COLL VENOUS BLD VENIPUNCTURE: CPT | Performed by: HOSPITALIST

## 2020-02-24 PROCEDURE — 97535 SELF CARE MNGMENT TRAINING: CPT | Mod: GO | Performed by: OCCUPATIONAL THERAPY ASSISTANT

## 2020-02-24 PROCEDURE — 99239 HOSP IP/OBS DSCHRG MGMT >30: CPT | Performed by: INTERNAL MEDICINE

## 2020-02-24 PROCEDURE — 00000146 ZZHCL STATISTIC GLUCOSE BY METER IP

## 2020-02-24 PROCEDURE — 82565 ASSAY OF CREATININE: CPT | Performed by: HOSPITALIST

## 2020-02-24 PROCEDURE — 97530 THERAPEUTIC ACTIVITIES: CPT | Mod: GO | Performed by: OCCUPATIONAL THERAPY ASSISTANT

## 2020-02-24 RX ORDER — METOPROLOL TARTRATE 25 MG/1
25 TABLET, FILM COATED ORAL 2 TIMES DAILY
Qty: 60 TABLET | Refills: 0 | Status: SHIPPED | OUTPATIENT
Start: 2020-02-24

## 2020-02-24 RX ORDER — METFORMIN HCL 500 MG
1000 TABLET, EXTENDED RELEASE 24 HR ORAL
Qty: 30 TABLET | Refills: 0 | Status: SHIPPED | OUTPATIENT
Start: 2020-02-24

## 2020-02-24 RX ORDER — GLIPIZIDE 5 MG/1
5 TABLET, FILM COATED, EXTENDED RELEASE ORAL
Qty: 30 TABLET | Refills: 0 | Status: SHIPPED | OUTPATIENT
Start: 2020-02-24

## 2020-02-24 RX ADMIN — HYDROCHLOROTHIAZIDE 25 MG: 25 TABLET ORAL at 09:07

## 2020-02-24 RX ADMIN — PANTOPRAZOLE SODIUM 40 MG: 40 TABLET, DELAYED RELEASE ORAL at 07:38

## 2020-02-24 RX ADMIN — APIXABAN 5 MG: 5 TABLET, FILM COATED ORAL at 09:07

## 2020-02-24 RX ADMIN — LISINOPRIL 20 MG: 20 TABLET ORAL at 09:07

## 2020-02-24 RX ADMIN — INSULIN GLARGINE 18 UNITS: 100 INJECTION, SOLUTION SUBCUTANEOUS at 07:38

## 2020-02-24 ASSESSMENT — ACTIVITIES OF DAILY LIVING (ADL)
ADLS_ACUITY_SCORE: 12

## 2020-02-24 NOTE — PLAN OF CARE
Patient received discharge instructions and education, verbalized understanding of medication changes and follow-up appointments. Medications to be picked up at outside pharmacy, patient verbalized understanding. New diabetes education complete. All belongings sent with patient. Patient to discharge home.

## 2020-02-24 NOTE — PLAN OF CARE
Discharge Planner OT   Patient plan for discharge: home  Current status: Pt in chair upon arrival, fully dressed stating he will be discharging home, pt stated he has already participated in g/h tasks however willing to participate. Pt completed sit to stand with SEC and proper hand placement, independent. Pt amb to/from the bathroom with SEC and supervision, pt completed 2 g/h tasks at EOS with supervision, pt completed toilet on/off transfer with use or grab bars and commode over toilet with supervision. Pt stated he has a sink and ledge next to his toilet at home to assist with transfers on/off toilet. Pt amb in the hallway for 200' with SEC and SBA with no LOB noted.   Barriers to return to prior living situation: Lives alone, cognition  Recommendations for discharge: After collaboration with the OTR, recommend to home.   Rationale for recommendations: Pt lives alone but currently mobilizing with supervision and using cane for balance, pt able to complete BADLs with supervision, PT stating pt is IND with mobility, will anticipate independence with ADLS/IADLS       Entered by: Demetria Call 02/24/2020 8:41 AM        Pt discharged to home, see discharge summary, GOALS NOT MET      Occupational Therapy Discharge Summary    Reason for therapy discharge:    Discharged to home.    Progress towards therapy goal(s). See goals on Care Plan in [facility] electronic health record for goal details.  Goals not met.  Barriers to achieving goals:   discharge from facility.    Therapy recommendation(s):    No further therapy is recommended.

## 2020-02-24 NOTE — PLAN OF CARE
DATE & TIME: 02/24/20   0000    Cognitive Concerns/ Orientation : A+O x 4   BEHAVIOR & AGGRESSION TOOL COLOR: green  CIWA SCORE: NA   ABNL VS/O2: VSS on RA, slight hypertension  MOBILITY: independent in the room  PAIN MANAGMENT: denies pain  DIET: mod carb diet  BOWEL/BLADDER: continent  ABNL LAB/BG: none, bg stable  DRAIN/DEVICES: none  TELEMETRY RHYTHM: NA  SKIN: CDI  TESTS/PROCEDURES: none  D/C DAY/GOALS/PLACE: to home today when home care is set up  OTHER IMPORTANT INFO: new onset of Afib found this stay.

## 2020-02-24 NOTE — PROGRESS NOTES
Discussed discharge plans with patient. Noted patient is now returning to his  Home with home care instead of TCU . Pt/family was given the Medicare Compare list for Home Care, with associated star ratings to assist with choice for referrals/discharge planning Yes    Education was given to pt/family that star ratings are updated/maintained by Medicare and can be reviewed by visiting www.medicare.gov Yes  Patient chose to have [facility] services.  Appointment with primary MD is on 2/28 at 11:15 am

## 2020-02-24 NOTE — PLAN OF CARE
Physical Therapy Discharge Summary    Reason for therapy discharge:    Discharged to home with home therapy.    Progress towards therapy goal(s). See goals on Care Plan in [facility] electronic health record for goal details.  Goals partially met.  Barriers to achieving goals:   discharge from facility.    Therapy recommendation(s):    Continued therapy is recommended.  Rationale/Recommendations:  will benefit from home PT for continued progress towards safety and independence with functional mobility.

## 2020-02-24 NOTE — DISCHARGE SUMMARY
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 fib with RVR  HTN  DM, type 2- presumed new diagnosis  Acute kidney injury   Hypokalemia  Pseudo-hyponatremia   Anemia, acute, mild.   Erosive gastritis.     Hospital Course:    Anson Moise is a 64 years old male with PMH of HTN, ongoing RLE weakness and back pain and recently diagnosed DM2 presented to ER with generalized weakness. Noted to have severe dehydration with SANTHOSH, pseudohyponatremia, lactic acidosis and a new onset of A. fib RVR.       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  Though back pain is improved, progressive right lower extremity noted.  Patient has fallen without any injuries since his right knee joel. Was evaluated by PCP and had MRI of the lumbar spine as outpatient 2/14. Shows mild anterior wedging subacute compression fracture of L1 with 25% loss of vertebral body height.  Minimal retropulsion of the posterior superior corner. Severity of leg weakness not explained by above lumbar spinal MRI findings, and also not by stroke findings on MRI brain.  -Orthospine consulted, they do not feel that there is any neurocompression to explain for right leg weakness  -general neurology consulted; EMG completed on 2/20 which was consistent with diabetic amyotrophy, other findings consistent with generalized peripheral neuropathy. MRI of the lumbosacral plexus did not show any acute findings.    -Neurology discussed pulse high-dose steroids for diabetic amyotrophy, since he has been noticing improvement in strength he is more inclined to monitor for now and defer steroid.  -Patient continues to have ongoing improvement  in his left lower extremity.  Initial plan was for placement at rehab however due to significant improvement, physical therapy and Occupational Therapy recommended home PT/OT   -Outpatient neurology follow-up with Dr. Pearce in 3 to 4 weeks     Acute Ischemic stroke  -MRI brain showed recent right temporal lobe infarct. CTA head and neck done, showed no vascular stenosis or occlusions but small saccular aneurysm projecting inferiorly off the left carotid siphon, 2.5 mm maximum diameter.  -Neurology consulted  -Continue Eliquis  -Statin deferred due to normal LDL and A. fib as suspected etiology for stroke  -Triglycerides high, anticipate will improve with better control of his diabetes.  Defer medical treatment of triglyceride to PCP     SIRS:   -Tachycardic, marked leukocytosis, lactic acidosis and hypotensive to 80s (but while in A fib RVR) without clear source of infection. CXR negative, UA unremarkable, ongoing back pain but has improved, no diarrhea or abdominal pain.  Afebrile.  Presumed SIRS due to severe dehydration due to osmotic diuresis from severe hyperglycemia.   -Blood cultures sent from ER, started on vancomycin and Zosyn on admission, cultures are negative so far, and no concern for infection and so IV antibiotics discontinued   -Lactic acidosis and leukocytosis resolved with IV hydration. BP stable.     A. fib with RVR  HTN  Patient had an episode of A. fib RVR but then he converted to sinus with IV fluid resuscitation.  -TSH normal  -ECHO normal LVEF, no WMA.   -Cardiology consulted, Recommended anticoagulation given [id number] score of 5 (age, HTN, DM2, stroke).   -Continue Eliquis  -Continue prior to admission hydrochlorothiazide and lisinopril.  -Metoprolol 25 mg p.o. twice daily added.    DM, type 2- presumed new diagnosis  -Blood sugar was noted to be more than 600 on routine labs as outpatient 5 days PTA.  Was started on metformin.  HbA1c is 11.5.   -Started on Lantus in the hospital.   Patient suggested that he would feel more comfortable with oral antidiabetic regimen.  -Given his wishes at this time I discontinued Lantus.  We will increase his metformin to thousand milligrams extended release daily.  Glipizide 5 mg daily added to his regimen.  Will need close follow-up with PCP for ongoing medication adjustment.     Acute kidney injury: Cr at presentation 1.33, normal baseline.    -Cr normal with IV hydration     Hypokalemia  Pseudo-hyponatremia:   -Sodium 127 at admission expected, corrected 134 for blood sugar of 396.   -Normal with IV hydration.  -Replace K per protocol     Anemia, acute, mild.   Erosive gastritis.   Presented with hemoglobin of 17, noted same Hb level as outpatient last month.   -Noted drop in Hb down to 12 overnight after admission.  Patient does take low-dose ibuprofen daily but no hematochezia or melena. Was due for colonoscopy as outpatient, has never had one done prior.  - B12 folate normal stool for guaiac negative. No iron deficiency but given drop without other source, and needing AC, GI consulted, s/p EGD and colonoscopy, erosive gastropathy on EGD, 3 mm sigmoid rectal polyp on colonoscopy, resected  - OK to continue AC with PPI BID.   -Outpatient follow-up with GI in 1 to 2 weeks    Ethan Cowart MD    Significant Results and Procedures   See below    Pending Results     Unresulted Labs Ordered in the Past 30 Days of this Admission     No orders found from 1/18/2020 to 2/18/2020.          Code Status   Full code       Primary Care Physician   [facility]    Physical Exam   Temp: 98.3  F (36.8  C) Temp src: Oral BP: (!) 145/66   Heart Rate: 76 Resp: 18 SpO2: 94 % O2 Device: None (Room air)      Constitutional: AAOX3, NAD  Respiratory: CTA B/L, Normal WOB  Cardiovascular: RRR, No murmur  GI: Soft, Non- tender, BS- normoactive  Skin/Integument: Warm and dry, no rashes  Neuro: CN- grossly intact, Rt LE strength 3+; improving    Discharge Disposition    Discharged to home  Condition at discharge: Stable    Consultations This Hospital Stay   PHARMACY TO DOSE VANCO  CARDIOLOGY IP CONSULT  PHARMACY TO DOSE VANCO  SPINE SURGERY ADULT IP CONSULT  PHARMACY LIAISON FOR MEDICATION COVERAGE CONSULT  PHYSICAL THERAPY ADULT IP CONSULT  OCCUPATIONAL THERAPY ADULT IP CONSULT  SPEECH LANGUAGE PATH ADULT IP CONSULT  NEUROLOGY IP CONSULT  GASTROENTEROLOGY IP CONSULT  NEUROLOGY IP CONSULT  PHARMACY TO DOSE HEPARIN  PHARMACY TO DOSE HEPARIN  PHARMACY LIAISON FOR MEDICATION COVERAGE CONSULT  DIABETES EDUCATION IP CONSULT  CARE COORDINATOR IP CONSULT    Time Spent on this Encounter   IEthan MD, personally saw the patient today and spent greater than 30 minutes discharging this patient.    Discharge Orders      Home care nursing referral      Home Care PT Referral for Hospital Discharge      Home Care OT Referral for Hospital Discharge      Follow-up and recommended labs and tests    Follow up with primary care provider, Francisco Syed, within 7 days for hospital follow- up.    GI in 1-2 weeks  Dr Pearce in 3-4 weeks     Activity    Your activity upon discharge: activity as tolerated     MD face to face encounter    Documentation of Face to Face and Certification for Home Health Services    I certify that patient: Anson Moise is under my care and that I, or a nurse practitioner or physician's assistant working with me, had a face-to-face encounter that meets the physician face-to-face encounter requirements with this patient on: 2/23/2020.    This encounter with the patient was in whole, or in part, for the following medical condition, which is the primary reason for home health care: Deconditioning.    I certify that, based on my findings, the following services are medically necessary home health services: Nursing.    My clinical findings support the need for the above services because: Nurse is needed: To provide assessment and oversight required in the  home to assure adherence to the medical plan due to: Deconditioning.    Further, I certify that my clinical findings support that this patient is homebound (i.e. absences from home require considerable and taxing effort and are for medical reasons or [facility] services or infrequently or of short duration when for other reasons) because: Requires assistance of another person or specialized equipment to access medical services because patient:  Deconditioning.    Based on the above findings. I certify that this patient is confined to the home and needs intermittent skilled nursing care, physical therapy and/or speech therapy.  The patient is under my care, and I have initiated the establishment of the plan of care.  This patient will be followed by a physician who will periodically review the plan of care.  Physician/Provider to provide follow up care: Francisco Syed    Attending hospital physician (the Medicare certified [place] provider): Ethan Cowart MD  Physician Signature: See electronic signature associated with these discharge orders.  Date: 2/23/2020     Discharge Instructions    NO driving until OP OT evaluation     Full Code     Diet    Follow this diet upon discharge: Orders Placed This Encounter      Moderate Consistent CHO Diet       Discharge Medications   Current Discharge Medication List      START taking these medications    Details   apixaban ANTICOAGULANT (ELIQUIS) 5 MG tablet Take 1 tablet (5 mg) by mouth 2 times daily  Qty: 30 tablet, Refills: 1    Comments: Future refills by PCP Dr. Francisco Syed with phone number [phone].  Associated Diagnoses: Cerebrovascular accident (CVA), unspecified mechanism (H)      glipiZIDE (GLUCOTROL XL) 5 MG 24 hr tablet Take 1 tablet (5 mg) by mouth every morning (before breakfast)  Qty: 30 tablet, Refills: 0    Comments: Future refills by PCP Dr. Francisco Syed with phone number [phone].  Associated Diagnoses: Type 2 diabetes mellitus  with hyperglycemia, without long-term current use of insulin (H)      pantoprazole (PROTONIX) 40 MG EC tablet Take 1 tablet (40 mg) by mouth 2 times daily (before meals)  Qty: 60 tablet, Refills: 0    Comments: Future refills by PCP Dr. [facility] with phone number [phone].  Associated Diagnoses: Gastroesophageal reflux disease without esophagitis         CONTINUE these medications which have CHANGED    Details   metFORMIN (GLUCOPHAGE-XR) 500 MG 24 hr tablet Take 2 tablets (1,000 mg) by mouth daily (with dinner)  Qty: 30 tablet, Refills: 0    Associated Diagnoses: Type 2 diabetes mellitus with hyperglycemia, without long-term current use of insulin (H)         CONTINUE these medications which have NOT CHANGED    Details   fish oil-omega-3 fatty acids 1000 MG capsule Take 1 g by mouth daily      fluticasone (FLONASE) 50 MCG/ACT nasal spray Spray 2 sprays into both nostrils daily      hydrochlorothiazide (HYDRODIURIL) 25 MG tablet Take 25 mg by mouth daily      lisinopril (PRINIVIL/ZESTRIL) 20 MG tablet Take 20 mg by mouth daily          STOP taking these medications       naproxen sodium (ANAPROX) 220 MG tablet Comments:   Reason for Stopping:             Allergies   No Known Allergies  Data   Most Recent 3 CBC's:  Recent Labs   Lab Test 02/20/20  0813 02/19/20 0455 02/18/20 2226 02/18/20  0526  02/17/20  1541   WBC  --  7.0  --   --  5.9  --  15.7*   HGB 12.9* 12.5* 12.5*   < > 12.9*  --  17.0   MCV  --  85  --   --  85  --  84    144*  --   --  146*   < > 208    < > = values in this interval not displayed.      Most Recent 3 BMP's:  Recent Labs   Lab Test 02/23/20  0837 02/22/20  0942 02/21/20  0844 02/20/20  0813 02/19/20 2004 02/19/20 0455 02/18/20  0526   NA  --   --   --  141  --  138 140   POTASSIUM  --   --   --  3.9 3.8 3.3* 3.6   CHLORIDE  --   --   --  109  --  107 110*   CO2  --   --   --  28  --  26 26   BUN  --   --   --  7  --  11 23   CR 0.67 0.64* 0.60* 0.55*  --  0.71 0.80    ANIONGAP  --   --   --  4  --  5 4   SUZANNE  --   --   --  9.4  --  8.4* 8.1*   *  --   --  190*  --  193* 188*     Most Recent 2 LFT's:  Recent Labs   Lab Test 02/17/20  1541 02/13/20  0110   AST 9 12   ALT 13 17   ALKPHOS 83 122   BILITOTAL 0.9 0.4     Most Recent INR's and Anticoagulation Dosing History:  Anticoagulation Dose History     There is no flowsheet data to display.        Most Recent 3 Troponin's:No lab results found.  Most Recent Cholesterol Panel:  Recent Labs   Lab Test 02/19/20  0455   CHOL 116   LDL 46   HDL 38*   TRIG 160*     Most Recent 6 Bacteria Isolates From Any Culture (See EPIC Reports for Culture Details):  Recent Labs   Lab Test 02/17/20 2024 02/17/20  1804 02/17/20  1541   CULT No growth No growth No growth     Most Recent TSH, T4 and A1c Labs:  Recent Labs   Lab Test 02/18/20  0526 02/17/20 2025   TSH 0.96  --    A1C  --  11.5*       Results for orders placed or performed during the hospital encounter of 02/17/20   XR Chest 2 Views    Narrative    XR CHEST TWO VIEWS   2/17/2020 5:06 PM     HISTORY: Hypotension and tachycardia with shoulder pain, check for  pneumonia.    COMPARISON: None available      Impression    IMPRESSION: No acute airspace disease.    ZINA DALEY MD   MR Brain w/o & w Contrast    Narrative    MRI BRAIN WITHOUT AND WITH CONTRAST  2/18/2020 12:44 PM    HISTORY:  recent spell of A fib RVR, RLE weakness, r.o CVA     TECHNIQUE:  Multiplanar, multisequence MRI of the brain without and  with 8 mL Gadavist    COMPARISON: None.    FINDINGS:     Intracranial contents: There are areas of restricted diffusion in the  right temporal lobe. The largest or restricted diffusion is just  lateral to the trigone of the right lateral ventricle. These areas of  restricted diffusion are consistent with recent areas of infarction.  There is no evidence for any hemorrhage or mass effect.  There are no  gadolinium enhancing lesions. The arteries at the base of the brain  and  the dural venous sinuses appear patent.     Sella:  No significant abnormality accounting for technique.     Orbit: No significant abnormality accounting for technique.      Sinus/mastoids: No significant paranasal sinus mucosal disease. No  significant middle ear or mastoid effusion.    Bones/soft tissues: No aggressive osseous lesion involving the  calvarium, skull base, or visualized upper cervical spine.       Impression    IMPRESSION:  Areas of restricted diffusion in the right temporal lobe.  These are consistent with recent infarcts.  No bleed or mass effect.      JOHNNY ELIZONDO MD   CTA Head Neck with Contrast    Narrative    CT ANGIOGRAM OF THE HEAD AND NECK WITHOUT AND WITH CONTRAST  2/18/2020  8:44 PM     HISTORY: Right temporal infarct on MRI.    TECHNIQUE:  Precontrast localizing scans were followed by CT  angiography with an injection of 70mL [id number]-[id number] IV with scans through  the head and neck.  3D post processing was performed, images were  archived to PACS and used in interpretation of this study.  Estimates  of carotid stenoses are made relative to the distal internal carotid  artery diameters except as noted. Radiation dose for this scan was  reduced using automated exposure control, adjustment of the mA and/or  kV according to patient size, or iterative reconstruction technique.    COMPARISON: MRI 2/18/2020    CT HEAD FINDINGS:  No contrast enhancing lesions.   Cerebral blood  flow is grossly normal.    CT ANGIOGRAM HEAD FINDINGS: Calcified atherosclerotic plaque is seen  in the carotid siphons. This causes at most mild stenosis. There is  fetal origin of the right posterior cerebral artery from the internal  carotid, a normal variant. Arteries are widely patent. There is a  small saccular aneurysm projecting inferiorly off the distal left  carotid siphon about 2.5 mm maximum diameter. No other aneurysm.   Venous circulation is unremarkable.     CT ANGIOGRAM NECK FINDINGS:   Right carotid artery:  Calcified plaque in the bifurcation.  No  significant stenosis.      Left carotid artery: Calcified plaque in the bifurcation.  No  significant stenosis.      Vertebral arteries:   No significant stenosis.      Other findings: None.      Impression    IMPRESSION:   1. No evidence of arterial occlusion or significant stenosis to  account for the infarct.  2. Small saccular aneurysm projecting inferiorly off the left carotid  siphon, 2.5 mm maximum diameter.  3. Calcified atherosclerotic plaque in the carotid bifurcations and  siphons without significant stenosis.    ALBERTO LUCAS MD   MR Lumbosacral Plexus wwo Contrast    Narrative    EXAM: MR LUMBOSACRAL PLEXUS WWO CONTRAST  LOCATION: [facility]  DATE/TIME: 2/20/2020 7:43 PM    INDICATION: Right leg weakness.  COMPARISON: None.  TECHNIQUE: Routine multiplanar multisequence lumbosacral plexus MRI without intravenous contrast.    FINDINGS:  LUMBOSACRAL PLEXUS: Normal size and signal intensity of lumbosacral plexus components from the paraspinal regions through the sciatic notch bilaterally.     VISUALIZED SPINE: Diffuse bone marrow edema and loss of height along the superior endplate at the L1 level. Remainder of the lumbar vertebra are grossly normal in height. Normal position of the conus medullaris. Normal cauda equina nerve roots. There is   nonspecific edema underlying the iliacus muscles bilaterally. No bone marrow edema underlying this.      Impression    IMPRESSION:  1.  Normal lumbosacral plexus MRI.  2.  Evidence of a recent mild superior endplate compression fracture at the L1 level.  3.  There is nonspecific edema coursing deep to the iliacus muscles bilaterally. No underlying bone marrow edema.     Echocardiogram Complete    Narrative    [id number]  [id number]  [id number]  [id number]^LINDA^MONAE^CELI           [facility]  Echocardiography Laboratory  [address] [place] [address]        Name: DIANA MACKENZIE  MRN:  [id number]  : 1955  Study Date: 2020 10:28 AM  Age: 64 yrs  Gender: Male  Patient Location: [facility]  Reason For Study: Abn EKG  Ordering Physician: MONAE MCKEON  Performed By: Renata Garzon     BSA: 2.0 m2  Height: 69 in  Weight: 175 lb  BP: 162/84 mmHg  _____________________________________________________________________________  __        Procedure  Complete Portable Echo Adult. Optison (NDC #0199-2404) given intravenously.  _____________________________________________________________________________  __        Interpretation Summary     The left ventricle is normal in size. Proximal septal thickening is noted.  Left ventricular systolic function is normal. The visual ejection fraction is  estimated at 60-65%. Left ventricular diastolic function is normal. No  regional wall motion abnormalities noted. There is no thrombus seen in the  left ventricle.  The right ventricle is normal size. The right ventricular systolic function is  normal.  Trace to mild mitral and tricuspid regurgitation.  No pericardial effusion.  No previous study for comparison.  _____________________________________________________________________________  __        Left Ventricle  The left ventricle is normal in size. Proximal septal thickening is noted.  Left ventricular systolic function is normal. The visual ejection fraction is  estimated at 60-65%. Left ventricular diastolic function is normal. No  regional wall motion abnormalities noted. There is no thrombus seen in the  left ventricle.     Right Ventricle  The right ventricle is normal size. The right ventricular systolic function is  normal.     Atria  Normal left atrial size. Right atrial size is normal. There is no color  Doppler evidence of an atrial shunt.        Mitral Valve  There is trace mitral regurgitation.     Tricuspid Valve  There is mild (1+) tricuspid regurgitation. The right ventricular systolic  pressure is approximated at 31.1 mmHg plus the right  atrial pressure. IVC  diameter <2.1 cm collapsing >50% with sniff suggests a normal RA pressure of 3  mmHg. Doppler findings do not suggest pulmonary hypertension.     Aortic Valve  There is mild trileaflet aortic sclerosis. No aortic regurgitation is present.  No aortic stenosis is present.     Pulmonic Valve  There is no pulmonic valvular stenosis.     Vessels  The aortic root is normal size. Normal size ascending aorta. The inferior vena  cava is normal.     Pericardium  There is no pericardial effusion.        Rhythm  Sinus rhythm was noted.  _____________________________________________________________________________  __     MMode/2D Measurements & Calculations  IVSd: 1.1 cm  LVIDd: 4.2 cm  LVIDs: 2.0 cm  LVPWd: 1.2 cm  FS: 52.8 %  LV mass(C)d: 170.6 grams  LV mass(C)dI: 87.4 grams/m2  Ao root diam: 3.5 cm  LA dimension: 3.1 cm  asc Aorta Diam: 3.3 cm  LA/Ao: 0.89  LA Volume (BP): 45.0 ml  LA Volume Index (BP): 23.1 ml/m2     RWT: 0.57        Doppler Measurements & Calculations  MV E max teofilo: 83.1 cm/sec  MV A max teofilo: 70.6 cm/sec  MV E/A: 1.2  MV dec time: 0.19 sec  PA acc time: 0.05 sec  TR max teofilo: 279.0 cm/sec  TR max P.1 mmHg  E/E' avg: 10.3  Lateral E/e': 10.3  Medial E/e': 10.4              _____________________________________________________________________________  __        Report approved by: Carlo Shea 2020 01:02 PM

## 2020-02-25 ENCOUNTER — TELEPHONE (OUTPATIENT)
Dept: CARDIOLOGY | Facility: CLINIC | Age: 65
End: 2020-02-25

## 2020-02-25 NOTE — TELEPHONE ENCOUNTER
Patient was evaluated by cardiology while inpatient for new onset PAF in presence of dehydration and possible sepsis. Converted spontaneously to NSR. Started on low dose BB and Eliquis. Called patient to discuss any post hospital d/c questions he may have, review medication changes, and confirm f/u appts. Patient denied any questions regarding new medications or changes to PTA medications. Patient denied any SOB, chest pain, or light headedness. RN confirmed with patient that he has an apt scheduled on 3/4/20 with Dr. Hughes. Patient advised to call clinic with any cardiac related questions or concerns prior to this nida't. Patient verbalized understanding and agreed with plan. TITA Gutiérrez RN.

## 2020-03-04 ENCOUNTER — OFFICE VISIT (OUTPATIENT)
Dept: CARDIOLOGY | Facility: CLINIC | Age: 65
End: 2020-03-04
Payer: COMMERCIAL

## 2020-03-04 VITALS
BODY MASS INDEX: 25.62 KG/M2 | HEIGHT: 69 IN | HEART RATE: 76 BPM | DIASTOLIC BLOOD PRESSURE: 79 MMHG | WEIGHT: 173 LBS | SYSTOLIC BLOOD PRESSURE: 130 MMHG

## 2020-03-04 DIAGNOSIS — I48.0 PAROXYSMAL ATRIAL FIBRILLATION (H): Primary | ICD-10-CM

## 2020-03-04 PROCEDURE — 99204 OFFICE O/P NEW MOD 45 MIN: CPT | Performed by: INTERNAL MEDICINE

## 2020-03-04 ASSESSMENT — MIFFLIN-ST. JEOR: SCORE: 1560.1

## 2020-03-04 NOTE — PROGRESS NOTES
"Electrophysiology/ Clinic Note         H&P and Plan:     REASON FOR VISIT: Electrophysiology evaluation.      HISTORY OF PRESENT ILLNESS: Mr. Moise is a pleasant 64-year-old gentleman with a history hypertension and diabetes, who was recently been in the hospital with A. fib with RVR.  He is here for evaluation.    Patient was admitted in the hospital in the beginning of February with complaints of fatigue and leg weakness.  During hospital stay he was found to be in A. fib with RVR, which was an incidental finding.  Apparently he was asymptomatic and self converted back to normal rhythm.      Due to lower extremity weakness, he underwent extensive neuro work-up and a brain MRI showed a right temporal lobe lesion compatible with a recent stroke.  He was then started recommendation of metoprolol and Eliquis.    Today, he informs he is doing well.  Has no complaints during this visit.  Denies any sense of chest pain, shortness of breath, lightheadedness, near-syncope or syncope.    He is in normal rhythm on physical exam.  Previous EKGs were compatible with A. fib.  EKG post conversion showed normal sinus rhythm without signs of preexcitation.    Echocardiogram (02/18/2020) showed normal LV function.  EF estimated 60-65%.  No significant valve disease was noticed.    ASSESSMENT AND PLAN:   1.  Paroxysmal atrial fibrillation.  Responded well to metoprolol.  Continue current medical therapy.  2.  Embolic prevention.  CHADS-VASc score of 5.  Continue anticoagulation with Eliquis indefinitely.   3.  Followup care.  Follow up in clinic with an JENNY in 6 months.  If he continues to do well.  We will follow-up with him on a yearly basis.      Evelio Hughes MD    Physical Exam:  Vitals: /79   Pulse 76   Ht 1.753 m (5' 9\")   Wt 78.5 kg (173 lb)   BMI 25.55 kg/m      Constitutional:  AAO x3.  Pt is in NAD.  HEAD: normocephalic.  SKIN: Skin normal color, texture and turgor with no lesions or eruptions.  Eyes: PERRL, " EOMI.  ENT:  Supple, normal JVP. No lymphadenopathy or thyroid enlargement.  Chest:  CTAB.  Cardiac:   RRR, normal  S1 and S2.  No murmurs rubs or gallop.   Abdomen:  Normal BS.  Soft, non-tender and non-distended.  No rebound or guarding.    Extremities:  Pedious pulses palpable B/L.  No LE edema noticed.   Neurological: Strength and sensation grossly symmetric and intact throughout.       CURRENT MEDICATIONS:  Current Outpatient Medications   Medication Sig Dispense Refill     apixaban ANTICOAGULANT (ELIQUIS) 5 MG tablet Take 1 tablet (5 mg) by mouth 2 times daily 30 tablet 1     fish oil-omega-3 fatty acids 1000 MG capsule Take 1 g by mouth daily       fluticasone (FLONASE) 50 MCG/ACT nasal spray Spray 2 sprays into both nostrils daily       glipiZIDE (GLUCOTROL XL) 5 MG 24 hr tablet Take 1 tablet (5 mg) by mouth every morning (before breakfast) 30 tablet 0     hydrochlorothiazide (HYDRODIURIL) 25 MG tablet Take 25 mg by mouth daily       lisinopril (PRINIVIL/ZESTRIL) 20 MG tablet Take 20 mg by mouth daily        metFORMIN (GLUCOPHAGE-XR) 500 MG 24 hr tablet Take 2 tablets (1,000 mg) by mouth daily (with dinner) 30 tablet 0     metoprolol tartrate (LOPRESSOR) 25 MG tablet Take 1 tablet (25 mg) by mouth 2 times daily 60 tablet 0     pantoprazole (PROTONIX) 40 MG EC tablet Take 1 tablet (40 mg) by mouth 2 times daily (before meals) 60 tablet 0       ALLERGIES   No Known Allergies    PAST MEDICAL HISTORY:  Past Medical History:   Diagnosis Date     Cerebral infarction (H)      Diabetes (H)      Hypertension        PAST SURGICAL HISTORY:  Past Surgical History:   Procedure Laterality Date     COLONOSCOPY N/A 2/19/2020    Procedure: COLONOSCOPY, WITH POLYPECTOMY AND BIOPSY;  Surgeon: Mj Gamino MD;  Location:  GI     ESOPHAGOSCOPY, GASTROSCOPY, DUODENOSCOPY (EGD), COMBINED N/A 2/19/2020    Procedure: ESOPHAGOGASTRODUODENOSCOPY, WITH BIOPSY;  Surgeon: Mj Gamino MD;  Location:  GI       FAMILY  HISTORY:  History reviewed. No pertinent family history.    SOCIAL HISTORY:  Social History     Socioeconomic History     Marital status:      Spouse name: None     Number of children: None     Years of education: None     Highest education level: None   Occupational History     None   Social Needs     Financial resource strain: None     Food insecurity:     Worry: None     Inability: None     Transportation needs:     Medical: None     Non-medical: None   Tobacco Use     Smoking status: Current Every Day Smoker     Packs/day: 0.25     Types: Cigarettes     Smokeless tobacco: Never Used     Tobacco comment: trying to quit   Substance and Sexual Activity     Alcohol use: Yes     Comment: few drinks a night     Drug use: Never     Sexual activity: None   Lifestyle     Physical activity:     Days per week: None     Minutes per session: None     Stress: None   Relationships     Social connections:     Talks on phone: None     Gets together: None     Attends Anglican service: None     Active member of club or organization: None     Attends meetings of clubs or organizations: None     Relationship status: None     Intimate partner violence:     Fear of current or ex partner: None     Emotionally abused: None     Physically abused: None     Forced sexual activity: None   Other Topics Concern     Parent/sibling w/ CABG, MI or angioplasty before 65F 55M? Not Asked   Social History Narrative     None       Review of Systems:  Skin:  Negative     Eyes:  Positive for glasses  ENT:  Negative    Respiratory:  Negative    Cardiovascular:  Negative for;chest pain;dizziness;lightheadedness Positive for;edema  Gastroenterology: Negative    Genitourinary:  Negative    Musculoskeletal:  Negative    Neurologic:  Negative    Psychiatric:  Negative    Heme/Lymph/Imm:  Negative    Endocrine:  Positive for diabetes      Recent Lab Results:  LIPID RESULTS:  Lab Results   Component Value Date    CHOL 116 02/19/2020    HDL 38 (L)  02/19/2020    LDL 46 02/19/2020    TRIG 160 (H) 02/19/2020       LIVER ENZYME RESULTS:  Lab Results   Component Value Date    AST 9 02/17/2020    ALT 13 02/17/2020       CBC RESULTS:  Lab Results   Component Value Date    WBC 7.0 02/19/2020    RBC 4.34 (L) 02/19/2020    HGB 12.9 (L) 02/20/2020    HCT 38.2 (L) 02/20/2020    MCV 85 02/19/2020    MCH 28.8 02/19/2020    MCHC 33.9 02/19/2020    RDW 11.5 02/19/2020     02/20/2020       BMP RESULTS:  Lab Results   Component Value Date     02/20/2020    POTASSIUM 3.9 02/20/2020    CHLORIDE 109 02/20/2020    CO2 28 02/20/2020    ANIONGAP 4 02/20/2020     (H) 02/23/2020    BUN 7 02/20/2020    CR 0.71 02/24/2020    GFRESTIMATED >90 02/24/2020    GFRESTBLACK >90 02/24/2020    SUZANNE 9.4 02/20/2020        A1C RESULTS:  Lab Results   Component Value Date    A1C 11.5 (H) 02/17/2020       INR RESULTS:  No results found for: INR      ECHOCARDIOGRAM  No results found for this or any previous visit (from the past 8760 hour(s)).      No orders of the defined types were placed in this encounter.    No orders of the defined types were placed in this encounter.    There are no discontinued medications.      No diagnosis found.      CC  No referring provider defined for this encounter.

## 2020-03-04 NOTE — LETTER
3/4/2020    [address] [address]    RE: Anson JOSH Moise       Dear Colleague,    I had the pleasure of seeing Anson Moise in the [facility].    Electrophysiology/ Clinic Note         H&P and Plan:     REASON FOR VISIT: Electrophysiology evaluation.      HISTORY OF PRESENT ILLNESS: Mr. Moise is a pleasant 64-year-old gentleman with a history hypertension and diabetes, who was recently been in the hospital with A. fib with RVR.  He is here for evaluation.    Patient was admitted in the hospital in the beginning of February with complaints of fatigue and leg weakness.  During hospital stay he was found to be in A. fib with RVR, which was an incidental finding.  Apparently he was asymptomatic and self converted back to normal rhythm.      Due to lower extremity weakness, he underwent extensive neuro work-up and a brain MRI showed a right temporal lobe lesion compatible with a recent stroke.  He was then started recommendation of metoprolol and Eliquis.    Today, he informs he is doing well.  Has no complaints during this visit.  Denies any sense of chest pain, shortness of breath, lightheadedness, near-syncope or syncope.    He is in normal rhythm on physical exam.  Previous EKGs were compatible with A. fib.  EKG post conversion showed normal sinus rhythm without signs of preexcitation.    Echocardiogram (02/18/2020) showed normal LV function.  EF estimated 60-65%.  No significant valve disease was noticed.    ASSESSMENT AND PLAN:   1.  Paroxysmal atrial fibrillation.  Responded well to metoprolol.  Continue current medical therapy.  2.  Embolic prevention.  CHADS-VASc score of 5.  Continue anticoagulation with Eliquis indefinitely.   3.  Followup care.  Follow up in clinic with an JENNY in 6 months.  If he continues to do well.  We will follow-up with him on a yearly basis.      Evelio Hughes MD    Physical Exam:  Vitals: /79   Pulse  "76   Ht 1.753 m (5' 9\")   Wt 78.5 kg (173 lb)   BMI 25.55 kg/m      Constitutional:  AAO x3.  Pt is in NAD.  HEAD: normocephalic.  SKIN: Skin normal color, texture and turgor with no lesions or eruptions.  Eyes: PERRL, EOMI.  ENT:  Supple, normal JVP. No lymphadenopathy or thyroid enlargement.  Chest:  CTAB.  Cardiac:   RRR, normal  S1 and S2.  No murmurs rubs or gallop.   Abdomen:  Normal BS.  Soft, non-tender and non-distended.  No rebound or guarding.    Extremities:  Pedious pulses palpable B/L.  No LE edema noticed.   Neurological: Strength and sensation grossly symmetric and intact throughout.       CURRENT MEDICATIONS:  Current Outpatient Medications   Medication Sig Dispense Refill     apixaban ANTICOAGULANT (ELIQUIS) 5 MG tablet Take 1 tablet (5 mg) by mouth 2 times daily 30 tablet 1     fish oil-omega-3 fatty acids 1000 MG capsule Take 1 g by mouth daily       fluticasone (FLONASE) 50 MCG/ACT nasal spray Spray 2 sprays into both nostrils daily       glipiZIDE (GLUCOTROL XL) 5 MG 24 hr tablet Take 1 tablet (5 mg) by mouth every morning (before breakfast) 30 tablet 0     hydrochlorothiazide (HYDRODIURIL) 25 MG tablet Take 25 mg by mouth daily       lisinopril (PRINIVIL/ZESTRIL) 20 MG tablet Take 20 mg by mouth daily        metFORMIN (GLUCOPHAGE-XR) 500 MG 24 hr tablet Take 2 tablets (1,000 mg) by mouth daily (with dinner) 30 tablet 0     metoprolol tartrate (LOPRESSOR) 25 MG tablet Take 1 tablet (25 mg) by mouth 2 times daily 60 tablet 0     pantoprazole (PROTONIX) 40 MG EC tablet Take 1 tablet (40 mg) by mouth 2 times daily (before meals) 60 tablet 0       ALLERGIES   No Known Allergies    PAST MEDICAL HISTORY:  Past Medical History:   Diagnosis Date     Cerebral infarction (H)      Diabetes (H)      Hypertension        PAST SURGICAL HISTORY:  Past Surgical History:   Procedure Laterality Date     COLONOSCOPY N/A 2/19/2020    Procedure: COLONOSCOPY, WITH POLYPECTOMY AND BIOPSY;  Surgeon: Mj Gamino, " MD;  Location:  GI     ESOPHAGOSCOPY, GASTROSCOPY, DUODENOSCOPY (EGD), COMBINED N/A 2/19/2020    Procedure: ESOPHAGOGASTRODUODENOSCOPY, WITH BIOPSY;  Surgeon: Mj Gamino MD;  Location:  GI       FAMILY HISTORY:  History reviewed. No pertinent family history.    SOCIAL HISTORY:  Social History     Socioeconomic History     Marital status:      Spouse name: None     Number of children: None     Years of education: None     Highest education level: None   Occupational History     None   Social Needs     Financial resource strain: None     Food insecurity:     Worry: None     Inability: None     Transportation needs:     Medical: None     Non-medical: None   Tobacco Use     Smoking status: Current Every Day Smoker     Packs/day: 0.25     Types: Cigarettes     Smokeless tobacco: Never Used     Tobacco comment: trying to quit   Substance and Sexual Activity     Alcohol use: Yes     Comment: few drinks a night     Drug use: Never     Sexual activity: None   Lifestyle     Physical activity:     Days per week: None     Minutes per session: None     Stress: None   Relationships     Social connections:     Talks on phone: None     Gets together: None     Attends Latter day service: None     Active member of club or organization: None     Attends meetings of clubs or organizations: None     Relationship status: None     Intimate partner violence:     Fear of current or ex partner: None     Emotionally abused: None     Physically abused: None     Forced sexual activity: None   Other Topics Concern     Parent/sibling w/ CABG, MI or angioplasty before 65F 55M? Not Asked   Social History Narrative     None       Review of Systems:  Skin:  Negative     Eyes:  Positive for glasses  ENT:  Negative    Respiratory:  Negative    Cardiovascular:  Negative for;chest pain;dizziness;lightheadedness Positive for;edema  Gastroenterology: Negative    Genitourinary:  Negative    Musculoskeletal:  Negative    Neurologic:   Negative    Psychiatric:  Negative    Heme/Lymph/Imm:  Negative    Endocrine:  Positive for diabetes      Recent Lab Results:  LIPID RESULTS:  Lab Results   Component Value Date    CHOL 116 02/19/2020    HDL 38 (L) 02/19/2020    LDL 46 02/19/2020    TRIG 160 (H) 02/19/2020       LIVER ENZYME RESULTS:  Lab Results   Component Value Date    AST 9 02/17/2020    ALT 13 02/17/2020       CBC RESULTS:  Lab Results   Component Value Date    WBC 7.0 02/19/2020    RBC 4.34 (L) 02/19/2020    HGB 12.9 (L) 02/20/2020    HCT 38.2 (L) 02/20/2020    MCV 85 02/19/2020    MCH 28.8 02/19/2020    MCHC 33.9 02/19/2020    RDW 11.5 02/19/2020     02/20/2020       BMP RESULTS:  Lab Results   Component Value Date     02/20/2020    POTASSIUM 3.9 02/20/2020    CHLORIDE 109 02/20/2020    CO2 28 02/20/2020    ANIONGAP 4 02/20/2020     (H) 02/23/2020    BUN 7 02/20/2020    CR 0.71 02/24/2020    GFRESTIMATED >90 02/24/2020    GFRESTBLACK >90 02/24/2020    SUZANNE 9.4 02/20/2020        A1C RESULTS:  Lab Results   Component Value Date    A1C 11.5 (H) 02/17/2020       INR RESULTS:  No results found for: INR      ECHOCARDIOGRAM  No results found for this or any previous visit (from the past 8760 hour(s)).      No orders of the defined types were placed in this encounter.    No orders of the defined types were placed in this encounter.    There are no discontinued medications.      No diagnosis found.      Thank you for allowing me to participate in the care of your patient.    Sincerely,     Evelio Hughes MD     [facility]

## 2020-11-29 ENCOUNTER — HEALTH MAINTENANCE LETTER (OUTPATIENT)
Age: 65
End: 2020-11-29

## 2021-01-05 PROBLEM — I10 ESSENTIAL HYPERTENSION: Status: ACTIVE | Noted: 2020-02-12

## 2021-01-05 PROBLEM — G62.9 PERIPHERAL NEUROPATHY: Status: ACTIVE | Noted: 2020-02-28

## 2021-01-05 PROBLEM — E11.44: Status: ACTIVE | Noted: 2020-02-12

## 2021-01-05 PROBLEM — E11.44 DIABETIC AMYOTROPHY (H): Status: ACTIVE | Noted: 2020-02-28

## 2021-01-05 PROBLEM — Z86.73 HISTORY OF STROKE: Status: ACTIVE | Noted: 2021-01-05

## 2021-01-05 PROBLEM — K31.89 EROSIVE GASTROPATHY: Status: ACTIVE | Noted: 2021-01-05

## 2021-01-05 PROBLEM — I48.91 ATRIAL FIBRILLATION WITH RVR (H): Status: ACTIVE | Noted: 2020-02-28

## 2021-01-05 PROBLEM — I63.9 ISCHEMIC STROKE (H): Status: ACTIVE | Noted: 2020-02-28

## 2021-04-10 ENCOUNTER — HEALTH MAINTENANCE LETTER (OUTPATIENT)
Age: 66
End: 2021-04-10

## 2021-07-31 ENCOUNTER — HEALTH MAINTENANCE LETTER (OUTPATIENT)
Age: 66
End: 2021-07-31

## 2021-09-24 ENCOUNTER — HOSPITAL ENCOUNTER (INPATIENT)
Facility: CLINIC | Age: 66
LOS: 3 days | Discharge: HOME | DRG: 066 | End: 2021-09-27
Attending: EMERGENCY MEDICINE | Admitting: INTERNAL MEDICINE
Payer: COMMERCIAL

## 2021-09-24 ENCOUNTER — APPOINTMENT (OUTPATIENT)
Dept: CT IMAGING | Facility: CLINIC | Age: 66
DRG: 066 | End: 2021-09-24
Attending: EMERGENCY MEDICINE
Payer: COMMERCIAL

## 2021-09-24 DIAGNOSIS — M48.02 CERVICAL STENOSIS OF SPINAL CANAL: ICD-10-CM

## 2021-09-24 DIAGNOSIS — I63.9 ACUTE ISCHEMIC STROKE (H): ICD-10-CM

## 2021-09-24 DIAGNOSIS — R20.0 LEFT SIDED NUMBNESS: ICD-10-CM

## 2021-09-24 DIAGNOSIS — Z86.73 HISTORY OF STROKE: ICD-10-CM

## 2021-09-24 DIAGNOSIS — R53.1 LEFT-SIDED WEAKNESS: ICD-10-CM

## 2021-09-24 DIAGNOSIS — R26.89 BALANCE PROBLEMS: Primary | ICD-10-CM

## 2021-09-24 DIAGNOSIS — I48.0 PAROXYSMAL ATRIAL FIBRILLATION (H): ICD-10-CM

## 2021-09-24 LAB
ANION GAP SERPL CALCULATED.3IONS-SCNC: 7 MMOL/L (ref 3–14)
ATRIAL RATE - MUSE: 77 BPM
BASOPHILS # BLD AUTO: 0 10E3/UL (ref 0–0.2)
BASOPHILS NFR BLD AUTO: 0 %
BUN SERPL-MCNC: 23 MG/DL (ref 7–30)
CALCIUM SERPL-MCNC: 9.2 MG/DL (ref 8.5–10.1)
CHLORIDE BLD-SCNC: 102 MMOL/L (ref 94–109)
CO2 SERPL-SCNC: 26 MMOL/L (ref 20–32)
CREAT SERPL-MCNC: 1.43 MG/DL (ref 0.66–1.25)
DIASTOLIC BLOOD PRESSURE - MUSE: NORMAL MMHG
EOSINOPHIL # BLD AUTO: 0.1 10E3/UL (ref 0–0.7)
EOSINOPHIL NFR BLD AUTO: 1 %
ERYTHROCYTE [DISTWIDTH] IN BLOOD BY AUTOMATED COUNT: 12.3 % (ref 10–15)
GFR SERPL CREATININE-BSD FRML MDRD: 51 ML/MIN/1.73M2
GLUCOSE BLD-MCNC: 101 MG/DL (ref 70–99)
HCT VFR BLD AUTO: 44.2 % (ref 40–53)
HGB BLD-MCNC: 14.7 G/DL (ref 13.3–17.7)
IMM GRANULOCYTES # BLD: 0 10E3/UL
IMM GRANULOCYTES NFR BLD: 1 %
INTERPRETATION ECG - MUSE: NORMAL
LYMPHOCYTES # BLD AUTO: 2 10E3/UL (ref 0.8–5.3)
LYMPHOCYTES NFR BLD AUTO: 28 %
MCH RBC QN AUTO: 29.8 PG (ref 26.5–33)
MCHC RBC AUTO-ENTMCNC: 33.3 G/DL (ref 31.5–36.5)
MCV RBC AUTO: 90 FL (ref 78–100)
MONOCYTES # BLD AUTO: 0.8 10E3/UL (ref 0–1.3)
MONOCYTES NFR BLD AUTO: 12 %
NEUTROPHILS # BLD AUTO: 4.2 10E3/UL (ref 1.6–8.3)
NEUTROPHILS NFR BLD AUTO: 58 %
NRBC # BLD AUTO: 0 10E3/UL
NRBC BLD AUTO-RTO: 0 /100
P AXIS - MUSE: 48 DEGREES
PLATELET # BLD AUTO: 208 10E3/UL (ref 150–450)
POTASSIUM BLD-SCNC: 4.3 MMOL/L (ref 3.4–5.3)
PR INTERVAL - MUSE: 174 MS
QRS DURATION - MUSE: 84 MS
QT - MUSE: 382 MS
QTC - MUSE: 432 MS
R AXIS - MUSE: 53 DEGREES
RBC # BLD AUTO: 4.93 10E6/UL (ref 4.4–5.9)
SARS-COV-2 RNA RESP QL NAA+PROBE: NEGATIVE
SODIUM SERPL-SCNC: 135 MMOL/L (ref 133–144)
SYSTOLIC BLOOD PRESSURE - MUSE: NORMAL MMHG
T AXIS - MUSE: 55 DEGREES
VENTRICULAR RATE- MUSE: 77 BPM
WBC # BLD AUTO: 7.2 10E3/UL (ref 4–11)

## 2021-09-24 PROCEDURE — 84484 ASSAY OF TROPONIN QUANT: CPT | Performed by: INTERNAL MEDICINE

## 2021-09-24 PROCEDURE — 93005 ELECTROCARDIOGRAM TRACING: CPT

## 2021-09-24 PROCEDURE — 120N000001 HC R&B MED SURG/OB

## 2021-09-24 PROCEDURE — 99207 PR NO BILLABLE SERVICE THIS VISIT: CPT | Performed by: STUDENT IN AN ORGANIZED HEALTH CARE EDUCATION/TRAINING PROGRAM

## 2021-09-24 PROCEDURE — 99223 1ST HOSP IP/OBS HIGH 75: CPT | Mod: AI | Performed by: INTERNAL MEDICINE

## 2021-09-24 PROCEDURE — 99285 EMERGENCY DEPT VISIT HI MDM: CPT | Mod: 25

## 2021-09-24 PROCEDURE — 85025 COMPLETE CBC W/AUTO DIFF WBC: CPT | Performed by: EMERGENCY MEDICINE

## 2021-09-24 PROCEDURE — 36415 COLL VENOUS BLD VENIPUNCTURE: CPT | Performed by: EMERGENCY MEDICINE

## 2021-09-24 PROCEDURE — 80048 BASIC METABOLIC PNL TOTAL CA: CPT | Performed by: EMERGENCY MEDICINE

## 2021-09-24 PROCEDURE — 87635 SARS-COV-2 COVID-19 AMP PRB: CPT | Performed by: EMERGENCY MEDICINE

## 2021-09-24 PROCEDURE — 70498 CT ANGIOGRAPHY NECK: CPT

## 2021-09-24 PROCEDURE — C9803 HOPD COVID-19 SPEC COLLECT: HCPCS

## 2021-09-24 PROCEDURE — 250N000009 HC RX 250: Performed by: EMERGENCY MEDICINE

## 2021-09-24 PROCEDURE — 96365 THER/PROPH/DIAG IV INF INIT: CPT | Mod: 59

## 2021-09-24 PROCEDURE — 250N000011 HC RX IP 250 OP 636: Performed by: EMERGENCY MEDICINE

## 2021-09-24 PROCEDURE — 83735 ASSAY OF MAGNESIUM: CPT | Performed by: INTERNAL MEDICINE

## 2021-09-24 RX ORDER — ROSUVASTATIN CALCIUM 5 MG/1
5 TABLET, COATED ORAL DAILY
COMMUNITY

## 2021-09-24 RX ORDER — LISINOPRIL 40 MG/1
40 TABLET ORAL DAILY
Status: ON HOLD | COMMUNITY
End: 2021-09-27

## 2021-09-24 RX ORDER — IOPAMIDOL 755 MG/ML
70 INJECTION, SOLUTION INTRAVASCULAR ONCE
Status: COMPLETED | OUTPATIENT
Start: 2021-09-24 | End: 2021-09-24

## 2021-09-24 RX ORDER — AMLODIPINE BESYLATE 5 MG/1
5 TABLET ORAL DAILY
Status: ON HOLD | COMMUNITY
End: 2021-09-27

## 2021-09-24 RX ADMIN — SODIUM CHLORIDE 90 ML: 9 INJECTION, SOLUTION INTRAVENOUS at 21:33

## 2021-09-24 RX ADMIN — IOPAMIDOL 70 ML: 755 INJECTION, SOLUTION INTRAVENOUS at 21:33

## 2021-09-24 ASSESSMENT — MIFFLIN-ST. JEOR: SCORE: 1716.99

## 2021-09-24 ASSESSMENT — ENCOUNTER SYMPTOMS
SPEECH DIFFICULTY: 0
NUMBNESS: 1
WEAKNESS: 1
HEADACHES: 0

## 2021-09-24 NOTE — ED TRIAGE NOTES
Pt had an MRI today and neurologist called and told him to come to ED as he has blockages. Pt. Had been feeling numbness in left arm for 7 days. Pt. Has had a stroke in the past.

## 2021-09-24 NOTE — ED PROVIDER NOTES
History   Chief Complaint:  Stroke Symptoms       The history is provided by the patient and medical records.      Anson Moise is a 65 year old male with history of diabetes mellitus type II, stroke and atrial fibrillation (on Eliquis) who presents with left-sided weakness and numbness with gait instability for the last week. Reports he has had issues with balance and left hand  strength, but symptoms have been improving since onset. He saw his PCP today and MRIs were performed (see below), and PCP prompted him to present to the ED for an acute-subacute ischemic stroke with occlusion of the ICA, per the provider's note. He reports his symptoms are similar to what he experienced with his first stroke last year. He denies a history of atrial fibrillation. Denies any recent illness or exposure to COVID-19. He is vaccinated for COVID-19.    Imaging from [facility] 09/24/2021:  MR Spine Cervical WO Contrast:  1. Severe spinal canal narrowing at C4-5 and C5-6. Flattening of the cord with subtle cord myelopathic signal abnormality at these levels.   2. Severe right neural foraminal narrowing at C4-5 with likely impingement of the exiting C5 nerve root.  As per radiology.    MR Head Brain Stroke w/o Contrast, Angio Head w/o Contrast, Neck w and w/o Contrast:  MRI Head:  1. Acute/subacute right occipital, frontal and parietal lobe infarcts.     MRA Head:   1. Focal short segment occlusion of the right ICA petrous portion with   distal reconstitution.   2. Patient has right fetal PCA, a normal anatomic variant.     MRA Neck:   1. No evidence for hemodynamically significant ICA stenosis by NASCET   criteria.  2. Moderate short segment narrowing of the left V4 segment of the   vertebral artery.   As per radiology.    Review of Systems   Musculoskeletal: Positive for gait problem.   Neurological: Positive for weakness (right-sided) and numbness (left extremities). Negative for speech difficulty and  "headaches.   All other systems reviewed and are negative.    Allergies:  The patient has no known allergies.     Medications:  Eliquis  Hydrodiuril  Zestril  Norvasc  Crestor    Past Medical History:    Stroke  Diabetes mellitus type II  Hypertension  Atrial fibrillation  Peripheral neuropathy  Sepsis     Past Surgical History:    EGD combined    Social History:  Presents to the ED alone.  PCP: Dr. Olivier Quintanilla MD at [facility] in [place].    Physical Exam     Patient Vitals for the past 24 hrs:   BP Temp Temp src Pulse Resp SpO2 Height Weight   09/24/21 2100 -- -- -- -- -- -- 1.76 m (5' 9.29\") 94.2 kg (207 lb 10.8 oz)   09/24/21 1930 -- -- -- -- -- 98 % -- --   09/24/21 1843 (!) 154/77 -- -- -- -- -- -- --   09/24/21 1842 -- 97.9  F (36.6  C) Temporal 96 18 96 % -- --       Physical Exam  General: Well-nourished, appears to be resting comfortably when I enter the room  Eyes: PERRL, conjunctivae pink no scleral icterus or conjunctival injection  ENT:  Moist mucus membranes, posterior oropharynx clear without erythema or exudates  Respiratory:  Lungs clear to auscultation bilaterally, no crackles/rubs/wheezes.  Good air movement  CV: Normal rate and rhythm, no murmurs/rubs/gallops  GI:  Abdomen soft and non-distended.  Normoactive BS.  No tenderness, guarding or rebound  Skin: Warm, dry.  No rashes or petechiae  Musculoskeletal: No peripheral edema or calf tenderness  Neuro: Alert and oriented to person/place/time. PERRL, EOMI no nystagmus, no aphasia/facial droop/dysarthria, tongue midline, symmetric palatal elevation, ?decreased strength at left upper and left lower extremities vs right. normal coordination to FNF at BUE, gait deferred, negative romberg, decreased sensation intact to LT over LUE/LLE  Psychiatric: Normal affect    Emergency Department Course   ECG  ECG taken at 2006, ECG read at 2023  Normal sinus rhythm.  Normal ECG.   Rate 77 bpm. AZ interval 174 ms. QRS duration 84 ms. QT/QTc 382/432 ms. " P-R-T axes 48 53 55.     Imaging:  CTA Head Neck w/ Contrast:   HEAD CTA:   1.  No branch occlusion or aneurysm. 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   2.  Moderate focal stenosis at the proximal cavernous left ICA, progressed from prior.   3.  Multifocal moderate to severe stenosis of the left V4 segment, progressed in the interval.     NECK CTA:   1.  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 There is high-grade stenosis at origin of this vessel on 2/18/2020. Attenuated reconstitution at the mid V2 segment with a patent V3 segment.   2.  Mild to moderate eccentric narrowing of the distal left ICA just below the skull base.  As per radiology.    Laboratory:  CBC: WBC 7.2, HGB 14.7,     BMP: Glucose: 101 (H), Creatinine: 1.43 (H), GFR 51 (L) o/w WNL    Asymptomatic COVID19 Virus PCR by nasopharyngeal swab: Negative.    Emergency Department Course:    Reviewed:  I reviewed nursing notes, vitals, past medical history and care everywhere    Assessments:  1900 I obtained history and examined the patient as noted above.     Consults:   1927 I spoke with Essie Humphries, stroke neurology, regarding patient's presentation.  1932 I spoke with Essie Humphries, stroke neurology.  2011 I spoke with Essie Humphries, stroke neurology, and a CT scan was ordered.    Disposition:  The patient will board in the emergency department pending bed placement. Care was signed out to Dr. Haines.       Impression & Plan     CMS Diagnoses: The patient has stroke symptoms:         ED Stroke specific documentation           NIHSS PDF     Patient last known well time: 7 days ago  ED Provider first to bedside at: 1900  CT Results received at: NA    Thrombolytics:   Not given due to unclear or unfavorable risk-benefit profile for extended window thrombolysis beyond the conventional 4.5 hour time window.    If treating with thrombolytics: Ensure SBP<180 and DBP<105  prior to treatment with thrombolytics.  Administering thrombolytics after treatment with IV labetalol, hydralazine, or nicardipine is reasonable once BP control is established.    Endovascular Retrieval:  Not initiated due to absence of proximal vessel occlusion    National Institutes of Health Stroke Scale (Baseline)  Time Performed: 1852     Score    Level of consciousness: (0)   Alert, keenly responsive    LOC questions: (0)   Answers both questions correctly    LOC commands: (0)   Performs both tasks correctly    Best gaze: (0)   Normal    Visual: (0)   No visual loss    Facial palsy: (0)   Normal symmetrical movements    Motor arm (left): (0)   No drift    Motor arm (right): (0)   No drift    Motor leg (left): (0)   No drift    Motor leg (right): (0)   No drift    Limb ataxia: (0)   Absent    Sensory: (1)   Mild to moderate sensory loss    Best language: (0)   Normal- no aphasia    Dysarthria: (0)   Normal    Extinction and inattention: (0)   No abnormality        Total Score:  1        Stroke Mimics were considered (including migraine headache, seizure disorder, hypoglycemia (or hyperglycemia), head or spinal trauma, CNS infection, Toxin ingestion and shock state (e.g. sepsis) .    Medical Decision Making:  Anson Moise is a 65 year old male who comes after outpatient MRI showed new strokes in multiple distributions on the left as well as right ICA segment occlusion and left-sided V4 segment stenosis as well as severe cervical spine stenosis.  He is already on Eliquis.  I discussed with Dr. Humphries with stroke neurology who recommended holding the Eliquis and repeating the CTA to see if the occlusion seen on MRI imaging is artifact versus a progression or new occlusion.  If this is a new occlusion she would recommend starting on heparin.  Recommended admission to the hospital for further stroke work-up and neurology consultation.  The patient is not a TPA candidate given the symptoms started over 7 days ago.   The patient was in agreement with the plan.  Dr. Haines graciously agreed to admit the patient.    Covid-19  Anson Moise was evaluated during a global COVID-19 pandemic, which necessitated consideration that the patient might be at risk for infection with the SARS-CoV-2 virus that causes COVID-19.   Applicable protocols for evaluation were followed during the patient's care.   COVID-19 was considered as part of the patient's evaluation. The plan for testing is:  a test was obtained during this visit.    Diagnosis:    ICD-10-CM    1. Acute ischemic stroke (H)  I63.9    2. Cervical stenosis of spinal canal  M48.02    3. Left sided numbness  R20.0    4. Left-sided weakness  R53.1    5. Balance problems  R26.89    6. Paroxysmal atrial fibrillation (H)  I48.0        Scribe Disclosure:  Gem DOZIER, am serving as a scribe at 6:52 PM on 9/24/2021 to document services personally performed by Giuliana Dorantes MD based on my observations and the provider's statements to me.          Giuliana Dorantes MD  09/25/21 0141

## 2021-09-25 ENCOUNTER — APPOINTMENT (OUTPATIENT)
Dept: SPEECH THERAPY | Facility: CLINIC | Age: 66
DRG: 066 | End: 2021-09-25
Attending: INTERNAL MEDICINE
Payer: COMMERCIAL

## 2021-09-25 LAB
ANION GAP SERPL CALCULATED.3IONS-SCNC: 8 MMOL/L (ref 3–14)
APTT PPP: 54 SECONDS (ref 22–38)
BUN SERPL-MCNC: 20 MG/DL (ref 7–30)
CALCIUM SERPL-MCNC: 9.4 MG/DL (ref 8.5–10.1)
CHLORIDE BLD-SCNC: 102 MMOL/L (ref 94–109)
CHOLEST SERPL-MCNC: 145 MG/DL
CO2 SERPL-SCNC: 25 MMOL/L (ref 20–32)
CREAT SERPL-MCNC: 1.23 MG/DL (ref 0.66–1.25)
ERYTHROCYTE [DISTWIDTH] IN BLOOD BY AUTOMATED COUNT: 12.2 % (ref 10–15)
GFR SERPL CREATININE-BSD FRML MDRD: 61 ML/MIN/1.73M2
GLUCOSE BLD-MCNC: 95 MG/DL (ref 70–99)
GLUCOSE BLDC GLUCOMTR-MCNC: 146 MG/DL (ref 70–99)
GLUCOSE BLDC GLUCOMTR-MCNC: 94 MG/DL (ref 70–99)
HCT VFR BLD AUTO: 44.6 % (ref 40–53)
HDLC SERPL-MCNC: 47 MG/DL
HGB BLD-MCNC: 14.9 G/DL (ref 13.3–17.7)
LDLC SERPL CALC-MCNC: 73 MG/DL
MAGNESIUM SERPL-MCNC: 2.2 MG/DL (ref 1.6–2.3)
MCH RBC QN AUTO: 30 PG (ref 26.5–33)
MCHC RBC AUTO-ENTMCNC: 33.4 G/DL (ref 31.5–36.5)
MCV RBC AUTO: 90 FL (ref 78–100)
NONHDLC SERPL-MCNC: 98 MG/DL
PLATELET # BLD AUTO: 207 10E3/UL (ref 150–450)
POTASSIUM BLD-SCNC: 4 MMOL/L (ref 3.4–5.3)
RBC # BLD AUTO: 4.96 10E6/UL (ref 4.4–5.9)
SODIUM SERPL-SCNC: 135 MMOL/L (ref 133–144)
TRIGL SERPL-MCNC: 124 MG/DL
TROPONIN I SERPL-MCNC: <0.015 UG/L (ref 0–0.04)
TROPONIN I SERPL-MCNC: <0.015 UG/L (ref 0–0.04)
UFH PPP CHRO-ACNC: >1.1 IU/ML
WBC # BLD AUTO: 6.3 10E3/UL (ref 4–11)

## 2021-09-25 PROCEDURE — 99232 SBSQ HOSP IP/OBS MODERATE 35: CPT | Performed by: INTERNAL MEDICINE

## 2021-09-25 PROCEDURE — 120N000001 HC R&B MED SURG/OB

## 2021-09-25 PROCEDURE — 36415 COLL VENOUS BLD VENIPUNCTURE: CPT | Performed by: INTERNAL MEDICINE

## 2021-09-25 PROCEDURE — 84484 ASSAY OF TROPONIN QUANT: CPT | Performed by: INTERNAL MEDICINE

## 2021-09-25 PROCEDURE — 80048 BASIC METABOLIC PNL TOTAL CA: CPT | Performed by: INTERNAL MEDICINE

## 2021-09-25 PROCEDURE — 250N000013 HC RX MED GY IP 250 OP 250 PS 637: Performed by: INTERNAL MEDICINE

## 2021-09-25 PROCEDURE — 85730 THROMBOPLASTIN TIME PARTIAL: CPT

## 2021-09-25 PROCEDURE — 85027 COMPLETE CBC AUTOMATED: CPT | Performed by: INTERNAL MEDICINE

## 2021-09-25 PROCEDURE — 250N000011 HC RX IP 250 OP 636: Performed by: INTERNAL MEDICINE

## 2021-09-25 PROCEDURE — 85520 HEPARIN ASSAY: CPT | Performed by: INTERNAL MEDICINE

## 2021-09-25 PROCEDURE — 92526 ORAL FUNCTION THERAPY: CPT | Mod: GN | Performed by: SPEECH-LANGUAGE PATHOLOGIST

## 2021-09-25 PROCEDURE — 99207 PR CONSULT E&M CHANGED TO INITIAL LEVEL: CPT | Performed by: PHYSICIAN ASSISTANT

## 2021-09-25 PROCEDURE — 99221 1ST HOSP IP/OBS SF/LOW 40: CPT | Performed by: PHYSICIAN ASSISTANT

## 2021-09-25 PROCEDURE — 85730 THROMBOPLASTIN TIME PARTIAL: CPT | Performed by: INTERNAL MEDICINE

## 2021-09-25 PROCEDURE — 80061 LIPID PANEL: CPT | Performed by: INTERNAL MEDICINE

## 2021-09-25 PROCEDURE — 36415 COLL VENOUS BLD VENIPUNCTURE: CPT

## 2021-09-25 PROCEDURE — 250N000011 HC RX IP 250 OP 636

## 2021-09-25 PROCEDURE — 92610 EVALUATE SWALLOWING FUNCTION: CPT | Mod: GN | Performed by: SPEECH-LANGUAGE PATHOLOGIST

## 2021-09-25 RX ORDER — AMOXICILLIN 250 MG
1-2 CAPSULE ORAL 2 TIMES DAILY
Status: DISCONTINUED | OUTPATIENT
Start: 2021-09-25 | End: 2021-09-27 | Stop reason: HOSPADM

## 2021-09-25 RX ORDER — SODIUM CHLORIDE 9 MG/ML
INJECTION, SOLUTION INTRAVENOUS CONTINUOUS PRN
Status: DISCONTINUED | OUTPATIENT
Start: 2021-09-25 | End: 2021-09-27 | Stop reason: HOSPADM

## 2021-09-25 RX ORDER — LIDOCAINE 40 MG/G
CREAM TOPICAL
Status: DISCONTINUED | OUTPATIENT
Start: 2021-09-25 | End: 2021-09-27 | Stop reason: HOSPADM

## 2021-09-25 RX ORDER — HEPARIN SODIUM 10000 [USP'U]/100ML
0-5000 INJECTION, SOLUTION INTRAVENOUS CONTINUOUS
Status: DISCONTINUED | OUTPATIENT
Start: 2021-09-25 | End: 2021-09-25

## 2021-09-25 RX ORDER — ONDANSETRON 4 MG/1
4 TABLET, ORALLY DISINTEGRATING ORAL EVERY 6 HOURS PRN
Status: DISCONTINUED | OUTPATIENT
Start: 2021-09-25 | End: 2021-09-27 | Stop reason: HOSPADM

## 2021-09-25 RX ORDER — ONDANSETRON 2 MG/ML
4 INJECTION INTRAMUSCULAR; INTRAVENOUS EVERY 6 HOURS PRN
Status: DISCONTINUED | OUTPATIENT
Start: 2021-09-25 | End: 2021-09-27 | Stop reason: HOSPADM

## 2021-09-25 RX ORDER — HEPARIN SODIUM 10000 [USP'U]/100ML
0-5000 INJECTION, SOLUTION INTRAVENOUS CONTINUOUS
Status: DISCONTINUED | OUTPATIENT
Start: 2021-09-25 | End: 2021-09-27 | Stop reason: HOSPADM

## 2021-09-25 RX ORDER — METOPROLOL TARTRATE 25 MG/1
25 TABLET, FILM COATED ORAL 2 TIMES DAILY
Status: DISCONTINUED | OUTPATIENT
Start: 2021-09-25 | End: 2021-09-27 | Stop reason: HOSPADM

## 2021-09-25 RX ORDER — ROSUVASTATIN CALCIUM 5 MG/1
5 TABLET, COATED ORAL DAILY
Status: DISCONTINUED | OUTPATIENT
Start: 2021-09-25 | End: 2021-09-27

## 2021-09-25 RX ORDER — PANTOPRAZOLE SODIUM 40 MG/1
40 TABLET, DELAYED RELEASE ORAL
Status: DISCONTINUED | OUTPATIENT
Start: 2021-09-25 | End: 2021-09-27 | Stop reason: HOSPADM

## 2021-09-25 RX ADMIN — HEPARIN SODIUM 1150 UNITS/HR: 10000 INJECTION, SOLUTION INTRAVENOUS at 06:03

## 2021-09-25 RX ADMIN — METOPROLOL TARTRATE 25 MG: 25 TABLET, FILM COATED ORAL at 08:52

## 2021-09-25 RX ADMIN — METOPROLOL TARTRATE 25 MG: 25 TABLET, FILM COATED ORAL at 20:31

## 2021-09-25 RX ADMIN — HEPARIN SODIUM 1150 UNITS/HR: 10000 INJECTION, SOLUTION INTRAVENOUS at 08:25

## 2021-09-25 RX ADMIN — ROSUVASTATIN CALCIUM 5 MG: 5 TABLET, FILM COATED ORAL at 14:22

## 2021-09-25 RX ADMIN — PANTOPRAZOLE SODIUM 40 MG: 40 TABLET, DELAYED RELEASE ORAL at 08:52

## 2021-09-25 RX ADMIN — PANTOPRAZOLE SODIUM 40 MG: 40 TABLET, DELAYED RELEASE ORAL at 16:17

## 2021-09-25 ASSESSMENT — ACTIVITIES OF DAILY LIVING (ADL)
ADLS_ACUITY_SCORE: 5
ADLS_ACUITY_SCORE: 5

## 2021-09-25 ASSESSMENT — MIFFLIN-ST. JEOR: SCORE: 1666.23

## 2021-09-25 NOTE — CONSULTS
"    [facility]    Stroke Telephone Note    I was called by Giuliana Dorantes on 09/24/21 regarding patient Anson Moise. The patient is a 65 year old male with history of stroke and a fib on apixaban who presents for one week of left arm sensory and motor deficits who has multiple R hemispheric territories of restricted diffusion demonstrated on outpatient MRI from today. MRA suggests R ICA short segment occlusion (3 mm) in the petrous segment with robust distal reconstitution.    Imaging Findings   Images from [facility] pushed to exceptions lookup, reviewed independently  R MCA and PCA territory scattered embolic infarcts with no hemorrhagic component  R ICA occlusion per above  Fetal PCA on the R    Impression  Acute ischemic stroke of R hemisphere due to undetermined etiology   R ICA total/near occlusion vs intraluminal thrombus    Recommendations   - Use orderset: \"Ischemic Stroke Routine Admission\" or \"Ischemic Stroke No Thrombolytics/No Thrombectomy ICU Admission\"  - Neurochecks and Vital Signs every 2 hours   - Permissive HTN; goal SBP < 180 mmHg  - Hold PTA apixaban until CTA resulted, possibly heparin gtt pending results  - Statin: continue PTA crestor 5, adjustment pending LDL, goal 40-70  - CTA head and neck now  - TTE (with Bubble Study if age 60 yrs or less)  - Telemetry, EKG  - Bedside Glucose Monitoring  - Nutrition: pending swallow screen  - A1c, Lipid Panel, Troponin x 3  - PT/OT/SLP  - Stroke Education  - Euthermia, Euglycemia    My recommendations are based on the information provided over the phone by Anson Moise's in-person providers. They are not intended to replace the clinical judgment of his in-person providers. I was not requested to personally see or examine the patient at this time.    Essie Humphries MD  Vascular Neurology  To page me or covering stroke neurology team member, click here: AMCOM  Choose \"On Call\" tab at top, then search dropdown box for \"Neurology " "Adult\", select location, press Enter, then look for stroke/neuro ICU/Telestroke.  "

## 2021-09-25 NOTE — CONSULTS
"      [facility]    Stroke Consult Note    Reason for Consult: \"stroke\"    Chief Complaint: Stroke Symptoms     HPI  Anson Moise is a 65 year old male with PMH of atrial fibrillation on Eliquis, hx of stroke, T2DM, HTN, who presented to the emergency department after an outpatient abnormal MRI.  Patient reports new left-sided weakness/numbness with associated gait instability that started last week.  He initially made an appointment with his [facility]CP [facility]  MRI at outside hospital revealed right cerebral hemisphere infarcts involving the frontal and parietal lobes.  MRI head and neck notable for focal short segment occlusion of right ICA.  Repeat vessel imaging was obtained upon arrival to [facility].  Patient managed for further medical management.  PTA held, patient started on heparin drip.  Today on exam patient reports continued incoordination/weakness in left upper extremity, and decreased sensation in left lower extremity.  He does not endorse any current visual disturbances.  Patient denies history of obstructive sleep apnea, but states that he does snore at night.  He has not been evaluated by sleep medicine.  Patient also reports history of smoking, but states he quit about a month or so ago with the assistance of medications.  He does not endorse current cravings and has declined the patch/gum/etc.     Stroke Evaluation Summarized    MRI/Head CT per HPI   Intracranial Vasculature CTA head with moderate focal stenosis at the proximal cavernous left ICA, severe multifocal stenosis of left V4. MRA head from OSH demonstrated short segment right ICA occlusion in petrous segment. 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 There is high-grade stenosis at origin of this vessel on 2/18/2020. Attenuated reconstitution at the mid V2 segment with a patent V3 segment.     Echocardiogram " "Pending    EKG/Telemetry SR    Other Testing Not Applicable     LDL  9/25/2021: 73 mg/dL   A1C  6.2   Troponin 9/25/2021: <0.015 ug/L     Impression  65 year old male with history of stroke and atrial fibrillation on Eliquis PTA with new left sided weakness/numbness. MRI brain reveals acute/subacute infarcts within the right cerebral hemisphere.     Recommendations [ADDENDUM on 9/27/2021]  -Q4 Neuro checks   -Continue heparin gtt, will discuss restarting AC tomorrow  -. Permissive HTN; goal SBP < 180 mmHg  -A1c within goal range <7.0  -Continue PTA statin   -Continued smoking cessation   -Therapies as needed   -PLC  -Likely will need outpatient Sleep Study for evaluation of BRENDA     Patient Follow-up    - final recommendation pending work-up    Thank you for this consult. We will continue to follow.     Tamika Frank PA-C   Neurology  To page me or covering stroke neurology team member, click here: AMCOM   Choose \"On Call\" tab at top, then search dropdown box for \"Neurology Adult\", select location, press Enter, then look for stroke/neuro ICU/telestroke.  _____________________________________________________    Clinically Significant Risk Factors Present on Admission            # Coagulation Defect: home medication list includes an anticoagulant medication          Past Medical History   Past Medical History:   Diagnosis Date     Cerebral infarction (H)      Diabetes (H)      Hypertension      Past Surgical History   Past Surgical History:   Procedure Laterality Date     COLONOSCOPY N/A 2/19/2020    Procedure: COLONOSCOPY, WITH POLYPECTOMY AND BIOPSY;  Surgeon: Mj Gamino MD;  Location:  GI     ESOPHAGOSCOPY, GASTROSCOPY, DUODENOSCOPY (EGD), COMBINED N/A 2/19/2020    Procedure: ESOPHAGOGASTRODUODENOSCOPY, WITH BIOPSY;  Surgeon: Mj Gamino MD;  Location:  GI     Medications   Home Meds  Prior to Admission medications    Medication Sig Start Date End Date Taking? Authorizing Provider   amLODIPine " (NORVASC) 5 MG tablet Take 5 mg by mouth daily   Yes Unknown, Entered By History   apixaban ANTICOAGULANT (ELIQUIS) 5 MG tablet Take 1 tablet (5 mg) by mouth 2 times daily 2/23/20  Yes Ethan Cowart MD   glipiZIDE (GLUCOTROL XL) 5 MG 24 hr tablet Take 1 tablet (5 mg) by mouth every morning (before breakfast) 2/24/20  Yes Ethan Cowart MD   hydrochlorothiazide (HYDRODIURIL) 25 MG tablet Take 25 mg by mouth daily   Yes Unknown, Entered By History   lisinopril (ZESTRIL) 40 MG tablet Take 40 mg by mouth daily   Yes Unknown, Entered By History   metFORMIN (GLUCOPHAGE-XR) 500 MG 24 hr tablet Take 2 tablets (1,000 mg) by mouth daily (with dinner) 2/24/20  Yes Ethan Cowart MD   metoprolol tartrate (LOPRESSOR) 25 MG tablet Take 1 tablet (25 mg) by mouth 2 times daily 2/24/20  Yes Ethan Cowart MD   pantoprazole (PROTONIX) 40 MG EC tablet Take 1 tablet (40 mg) by mouth 2 times daily (before meals) 2/23/20  Yes Ethan Cowart MD   rosuvastatin (CRESTOR) 5 MG tablet Take 5 mg by mouth daily   Yes Unknown, Entered By History       Scheduled Meds    metoprolol tartrate  25 mg Oral BID     pantoprazole  40 mg Oral BID AC       Infusion Meds    heparin 1,150 Units/hr (09/25/21 0825)     - MEDICATION INSTRUCTIONS -       - MEDICATION INSTRUCTIONS -       sodium chloride         PRN Meds  - MEDICATION INSTRUCTIONS -, - MEDICATION INSTRUCTIONS -, ondansetron **OR** ondansetron, sodium chloride    Allergies   No Known Allergies  Family History   No family history on file.  Social History   Social History     Tobacco Use     Smoking status: Current Every Day Smoker     Packs/day: 0.25     Types: Cigarettes     Smokeless tobacco: Never Used     Tobacco comment: trying to quit   Substance Use Topics     Alcohol use: Yes     Comment: few drinks a night     Drug use: Never       Review of Systems   The 10 point Review of Systems is negative other than noted in the HPI or here.      PHYSICAL EXAMINATION   Temp:   [97.9  F (36.6  C)] 97.9  F (36.6  C)  Pulse:  [75-96] 75  Resp:  [10-18] 10  BP: (141-163)/(66-86) 152/76  SpO2:  [88 %-99 %] 96 %    Neurologic  Mental Status:  alert, oriented x 3, follows commands, subtle dysarthria (not noticed by patient)   Cranial Nerves:  visual fields intact, EOMI with normal smooth pursuit, facial sensation intact and symmetric, facial movements symmetric, hearing not formally tested but intact to conversation  Motor:  normal muscle tone and bulk, no abnormal movements, able to move all limbs spontaneously, + drift in LUE  Reflexes:  deferred  Sensory:  decreased sensation in LLE  Coordination:  ataxic in LUE/LLE, difficulty with rapid movements in LUE   Station/Gait:  deferred    Dysphagia Screen  Per Nursing    Stroke Scales    NIHSS  Interval baseline (09/25/21 1300)   Interval Comments     1a. Level of Consciousness 0-->Alert, keenly responsive   1b. LOC Questions 0-->Answers both questions correctly   1c. LOC Commands 0-->Performs both tasks correctly   2.   Best Gaze 1-->Partial gaze palsy, gaze is abnormal in one or both eyes, but forced deviation or total gaze paresis is not present   3.   Visual 0-->No visual loss   4.   Facial Palsy 0-->Normal symmetrical movements   5a. Motor Arm, Left 1-->Drift, limb holds 90 (or 45) degrees, but drifts down before full 10 seconds, does not hit bed or other support   5b. Motor Arm, Right 0-->No drift, limb holds 90 (or 45) degrees for full 10 secs   6a. Motor Leg, Left 0-->No drift, leg holds 30 degree position for full 5 secs   6b. Motor Leg, right 0-->No drift, leg holds 30 degree position for full 5 secs   7.   Limb Ataxia 1-->Present in one limb   8.   Sensory 1-->Mild-to-moderate sensory loss, patient feels pinprick is less sharp or is dull on the affected side, or there is a loss of superficial pain with pinprick, but patient is aware of being touched   9.   Best Language 0-->No aphasia, normal   10. Dysarthria 1-->Mild-to-moderate  dysarthria, patient slurs at least some words and, at worst, can be understood with some difficulty   11. Extinction and Inattention  0-->No abnormality   Total 5 (09/25/21 1430)     Imaging  I personally reviewed all imaging; relevant findings per HPI.    Labs Data   CBC  Recent Labs   Lab 09/25/21 0558 09/24/21 1938   WBC 6.3 7.2   RBC 4.96 4.93   HGB 14.9 14.7   HCT 44.6 44.2    208     Basic Metabolic Panel   Recent Labs   Lab 09/25/21 0558 09/24/21 1938    135   POTASSIUM 4.0 4.3   CHLORIDE 102 102   CO2 25 26   BUN 20 23   CR 1.23 1.43*   GLC 95 101*   SUZANNE 9.4 9.2     Liver Panel  No results for input(s): PROTTOTAL, ALBUMIN, BILITOTAL, ALKPHOS, AST, ALT, BILIDIRECT in the last 168 hours.  INR  No lab results found.   Lipid Profile    Recent Labs   Lab Test 09/25/21 0558 02/19/20  0455   CHOL 145 116   HDL 47 38*   LDL 73 46   TRIG 124 160*     A1C    Recent Labs   Lab Test 02/17/20 2025   A1C 11.5*     Troponin I    Recent Labs   Lab 09/25/21 0558   TROPONIN <0.015          Stroke Consult Data Data This was a non-emergent, non-telestroke consult.

## 2021-09-25 NOTE — PLAN OF CARE
Patient arrived on unit around 1300. Pt here with R CVA's. A&Ox4. Neuros show LUE and LLE weakness, 4/5 strength. LUE and LLE numbness/decreased sensation. Slight LUE drift. VSS on room air. Tele NSR. Mod Carb diet, thin liquids. Takes pills whole with water. Up with ax1, gait belt, cane, some imbalance noted. Denies pain. Voiding in bathroom. BG WNL. Pt scoring green on the Aggression Stop Light Tool. Plan for full Stroke workup, discharge pending.

## 2021-09-25 NOTE — PHARMACY-ADMISSION MEDICATION HISTORY
Pharmacy Medication History  Admission medication history interview status for the 9/24/2021  admission is complete. See EPIC admission navigator for prior to admission medications     Location of Interview: Patient room  Medication history sources: Patient, Surescripts and Patient's home med list    Significant changes made to the medication list:  Removed fish oil 1000 mg daily, flonase 2 sprays both nostrils daily (patient states not taking either for months, no recent fill history)   Adjusted lisinopril 20 mg daily --> 40 mg daily (see additional information)   Added rosuvastatin and amlodipine    In the past week, patient estimated taking medication this percent of the time: greater than 90%    Additional medication history information:   Patient is a reliable historian. Lisinopril dose increased 9/24/21 per SureScripts, patient verified that dose changes have been occurring recently but last dose taken was 20 mg, not 40 mg. Updated dose on PTA med list to reflect dose when patient picks up most current prescription from pharmacy.     Medication reconciliation completed by provider prior to medication history? No    Time spent in this activity: 20 minutes    Prior to Admission medications    Medication Sig Last Dose Taking? Auth Provider   amLODIPine (NORVASC) 5 MG tablet Take 5 mg by mouth daily 9/23/2021 at PM Yes Unknown, Entered By History   apixaban ANTICOAGULANT (ELIQUIS) 5 MG tablet Take 1 tablet (5 mg) by mouth 2 times daily 9/24/2021 at AM Yes Ethan Cowart MD   glipiZIDE (GLUCOTROL XL) 5 MG 24 hr tablet Take 1 tablet (5 mg) by mouth every morning (before breakfast) 9/24/2021 at AM Yes Ethan Cowart MD   hydrochlorothiazide (HYDRODIURIL) 25 MG tablet Take 25 mg by mouth daily 9/23/2021 at PM Yes Unknown, Entered By History   lisinopril (ZESTRIL) 40 MG tablet Take 40 mg by mouth daily 9/23/2021 at PM (20 mg)  Yes Unknown, Entered By History   metFORMIN (GLUCOPHAGE-XR) 500 MG 24 hr tablet Take  2 tablets (1,000 mg) by mouth daily (with dinner) 9/24/2021 at AM Yes Ethan Cowart MD   metoprolol tartrate (LOPRESSOR) 25 MG tablet Take 1 tablet (25 mg) by mouth 2 times daily 9/24/2021 at AM Yes Ethan Cowart MD   pantoprazole (PROTONIX) 40 MG EC tablet Take 1 tablet (40 mg) by mouth 2 times daily (before meals) 9/24/2021 at AM Yes Ethan Cowart MD   rosuvastatin (CRESTOR) 5 MG tablet Take 5 mg by mouth daily  at not picked up or started Yes Unknown, Entered By History       The information provided in this note is only as accurate as the sources available at the time of update(s)   Noel RobertsD

## 2021-09-25 NOTE — H&P
[facility]    History and Physical  Hospitalist       Date of Admission:  9/24/2021  Date of Service (when I saw the patient): 09/24/21    Assessment & Plan   Anson Moise is a 65 year old male who presents with L sided weakness    CVA  Hx CVA 2/2020 and afib on eliquis. Presents with L sided weakness and numbness, gait instability x 1 week. Sx improving since onset. MRI outpatient through primary demonstrated acute/ subacute R occipital, frontal and parietal lobe infarcts. MRA head with focal short segment occlusion of the R ICA with distal reconstitution. -150's systolic, other vitals stable.   - telemetry   - q2 hour vitals, neuro checks per neuro  - permissive hypertension with goal <180  - hold apixaban  - d/w neurology, start low dose heparin gtt without bolus  - neuro IR ([facility]) consult  - lipids, continue home rosuvastatin   - TTE  - bedside swallow  - Hgb A1c 6.2%  - troponins  - PT/OT/SLP    Severe spinal canal narrowing C4-5, C5-6  Severe R neural foraminal narrowing at C4-5  Noted on admitting MRI (done [facility]).   - neurosurgery follow up outpatient if indicated    Afib with RVR  HTN  HLD  [apixaban 5 mg BID, hydrochlorothiazide 25 mg daily, lisinopril 20 mg daily, metoprolol 25 mg BID, rosuvastatin 5 mg daily]  - hold antihypertensives  - continue metoprolol 25 mg BID  - hold apixaban as above  - continue rosuvastatin   - telemetry    SANTHOSH  Baseline creatinine ~1.1-1.2. On presentation at 1.43.   - IV fluids  - avoid nephrotoxins  - monitor creatinine    DM II  [glipizide XL 5 mg daily, metformin 500 mg BID]  Hgb A1C on day of admission 6.2%.   - hold orals  - BID and HS blood sugars  - start sliding scale insulin if indicated    GERD  [pantoprazole 40 mg BID]  - resume    COVID-19 negative    DVT Prophylaxis: heparin gtt  Code Status: DNR / DNI    Disposition: Expected discharge in 2-3 days     Des Haines MD  [phone] (P)  Text Page     Primary  Care Physician   [facility]    Chief Complaint   L sided weakness, incoordination    History is obtained from the patient and medical records    History of Present Illness   Anson Moise is a 65 year old male who presents with left-sided weakness and incoordination.  Patient notes that proximally 1 week ago he started noticing left arm numbness.  He also notes that he had difficulty with repetitive actions in his hand.  He is also noticed the last couple days he has had poor balance.  He also notes decreased energy.  He has decreased  strength on the left.  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  He feels like his left leg is stiff and not.  Denies any vision changes.  Denies dysphagia.  He has been taking his medications.  Denies chest pain or shortness of breath.  No nausea or vomiting.  No abdominal pain.    Past Medical History    I have reviewed this patient's medical history and updated it with pertinent information if needed.   Past Medical History:   Diagnosis Date     Cerebral infarction (H)      Diabetes (H)      Hypertension        Past Surgical History   I have reviewed this patient's surgical history and updated it with pertinent information if needed.  Past Surgical History:   Procedure Laterality Date     COLONOSCOPY N/A 2/19/2020    Procedure: COLONOSCOPY, WITH POLYPECTOMY AND BIOPSY;  Surgeon: Mj Gamino MD;  [facility]     ESOPHAGOSCOPY, GASTROSCOPY, DUODENOSCOPY (EGD), COMBINED N/A 2/19/2020    Procedure: ESOPHAGOGASTRODUODENOSCOPY, WITH BIOPSY;  Surgeon: Mj Gamino MD;  Location:  GI       Prior to Admission Medications   Prior to Admission Medications   Prescriptions Last Dose Informant Patient Reported? Taking?   amLODIPine (NORVASC) 5 MG tablet 9/23/2021 at PM Self Yes Yes   Sig: Take 5 mg by mouth daily   apixaban ANTICOAGULANT (ELIQUIS) 5 MG tablet 9/24/2021 at AM Self No Yes   Sig: Take 1  tablet (5 mg) by mouth 2 times daily   glipiZIDE (GLUCOTROL XL) 5 MG 24 hr tablet 9/24/2021 at AM Self No Yes   Sig: Take 1 tablet (5 mg) by mouth every morning (before breakfast)   hydrochlorothiazide (HYDRODIURIL) 25 MG tablet 9/23/2021 at PM Self Yes Yes   Sig: Take 25 mg by mouth daily   lisinopril (ZESTRIL) 40 MG tablet 9/23/2021 at PM (20 mg) Pharmacy Yes Yes   Sig: Take 40 mg by mouth daily   metFORMIN (GLUCOPHAGE-XR) 500 MG 24 hr tablet 9/24/2021 at AM Self No Yes   Sig: Take 2 tablets (1,000 mg) by mouth daily (with dinner)   metoprolol tartrate (LOPRESSOR) 25 MG tablet 9/24/2021 at AM Self No Yes   Sig: Take 1 tablet (25 mg) by mouth 2 times daily   pantoprazole (PROTONIX) 40 MG EC tablet 9/24/2021 at AM Self No Yes   Sig: Take 1 tablet (40 mg) by mouth 2 times daily (before meals)   rosuvastatin (CRESTOR) 5 MG tablet  at not picked up or started Pharmacy Yes Yes   Sig: Take 5 mg by mouth daily      Facility-Administered Medications: None     Allergies   No Known Allergies    Social History   I have reviewed this patient's social history and updated it with pertinent information if needed. Anson Moise  reports that he has been smoking cigarettes. He has been smoking about 0.25 packs per day. He has never used smokeless tobacco. He reports current alcohol use. He reports that he does not use drugs.    Family History   I have reviewed this patient's family history and updated it with pertinent information if needed.   Family history reviewed and other than the above is noncontributory to this visit. Denies hx of CVA or heart disease.    Review of Systems   The 10 point Review of Systems is negative other than noted in the HPI or here.     Physical Exam   Temp: 97.9  F (36.6  C) Temp src: Temporal BP: (!) 154/77 Pulse: 96   Resp: 18 SpO2: 96 % O2 Device: None (Room air)    Vital Signs with Ranges  0 lbs 0 oz    Constitutional: alert, oriented and in no acute distress  Eyes: EOMI, PERRL  HEENT: OP  clear  Respiratory: CTA B without w/c  Cardiovascular: regular rhythm, rate normal, no murmur. no edema.  GI: soft, nontender, nondistended, BS present  Lymph/Hematologic: no cervical LAD  Genitourinary: deferred  Skin: no rashes or lesions grossly  Musculoskeletal: no deformities or arthritis  Neurologic: CN II-XII, CHOUDHARY. Slightly decreased  strength on L  Psychiatric: mood and affect wnl    Data   Data reviewed today:  I personally reviewed the EKG tracing showing NSR and the brain MRI image(s) showing CVAs as discussed above.  Recent Labs   Lab 09/24/21  1938   WBC 7.2   HGB 14.7   MCV 90         POTASSIUM 4.3   CHLORIDE 102   CO2 26   BUN 23   CR 1.43*   ANIONGAP 7   SUZANNE 9.2   *       No results found for this or any previous visit (from the past 24 hour(s)).

## 2021-09-25 NOTE — PROGRESS NOTES
[facility]    Medicine Progress Note - Hospitalist Service       Date of Admission:  9/24/2021    Assessment & Plan         Anson Moise is a 65 year old male who presents with L sided weakness     CVA  Hx CVA 2/2020 and afib on eliquis. Presented with L sided weakness and numbness, gait instability x 1 week. Symptoms have been improved but still present.  MRI outpatient through primary demonstrated acute/subacute R occipital, frontal and parietal lobe infarcts. MRA head with focal short segment occlusion of the R ICA with distal reconstitution.  * HgbA1c is 6.2  - Monitor on telemetry telemetry   - Q2h hour vitals, neuro checks per neuro.  Will defer de-escalating to them   - Permissive hypertension with goal <180  - Holding PTA apixaban for now.  Will anticoagulate with heparin drip.  Resume Eliquis once okay with neurology   - Lipids, continue home rosuvastatin   - TTE ordered   - PT/OT/SLP  - Neurology following and appreciate their recommendations      Severe spinal canal narrowing C4-5, C5-6  Severe R neural foraminal narrowing at C4-5  Noted on admitting MRI (done through [facility]).   - Neurosurgery follow up outpatient if indicated     H/o A fib with RVR  HTN  HLD  [apixaban 5 mg BID, hydrochlorothiazide 25 mg daily, lisinopril 20 mg daily, metoprolol 25 mg BID, rosuvastatin 5 mg daily]  - Holding antihypertensives as above   - Continue metoprolol 25 mg BID  - Holding apixaban as above  - Rosuvastatin      SANTHOSH. Improved   Baseline creatinine ~1.1-1.2. On presentation at 1.43.  Down to 1.23 on recheck   - Avoid nephrotoxins as able   - Monitor   - Stopped IVF      DM II  [glipizide XL 5 mg daily, metformin 500 mg BID]  Hgb A1C on day of admission 6.2%.   - Holding orals  - BID and HS blood sugars.  No indication for SSI at this time      GERD  [pantoprazole 40 mg BID]  - PPI       Diet: Combination Diet Regular Diet Adult; Thin Liquids (level 0); Consistent Carb 60 Grams CHO per  Meal Diet    DVT Prophylaxis: Heparin drip   Chilel Catheter: Not present  Central Lines: None  Code Status: No CPR- Do NOT Intubate      Disposition Plan   Expected discharge: 1-3 days once neuro evaluation complete.  Will have therapy evaluate to help with disposition      The patient's care was discussed with the Patient.    Mk Pierre DO  Hospitalist Service  [facility]  Securely message with the Vocera Web Console (learn more here)  Text page via [employer] Paging/Directory      Clinically Significant Risk Factors Present on Admission             # Coagulation Defect: home medication list includes an anticoagulant medication       ______________________________________________________________________    Interval History   Patient seen and examined.  No acute events over night.  Believes left sided weakness may be improving.  No pain currently.  No vision changes or speech changes.  No fevers    Data reviewed today: I reviewed all medications, new labs and imaging results over the last 24 hours. I personally reviewed no images or EKG's today.    Physical Exam   Vital Signs: Temp: 97.9  F (36.6  C) Temp src: Temporal BP: (!) 152/76 Pulse: 75   Resp: 10 SpO2: 96 % O2 Device: None (Room air) Oxygen Delivery: 2 LPM  Weight: 207 lbs 10.77 oz  General Appearance: Resting comfortably.  NAD  Respiratory: Clear to auscultation  No respiratory distress  Cardiovascular: Sounds RRR.  No obvious murmusr  GI: Soft.  Non-distended  Skin: No obvious rashes or cyanosis to exposed skin   Other: Decreased  strength on the left.  No obvious facial droop     Data   Recent Labs   Lab 09/25/21  0558 09/24/21  1938   WBC 6.3 7.2   HGB 14.9 14.7   MCV 90 90    208    135   POTASSIUM 4.0 4.3   CHLORIDE 102 102   CO2 25 26   BUN 20 23   CR 1.23 1.43*   ANIONGAP 8 7   SUZANNE 9.4 9.2   GLC 95 101*   TROPONIN <0.015 <0.015     Recent Results (from the past 24 hour(s))   CTA Head Neck with Contrast     Narrative    EXAM: CTA  HEAD NECK WITH CONTRAST  LOCATION: [facility]  DATE/TIME: 9/24/2021 9:26 PM    INDICATION: Stroke/TIA, assess intracranial arteries Stroke/TIA, assess extracranial arteries  COMPARISON: CTA head and neck 2/18/2020, MRI brain 2/18/2020  CONTRAST: 70 mL Isovue-[id number]  TECHNIQUE: Head and neck CT angiogram with IV contrast. Axial helical CT images of the head and neck vessels obtained during the arterial phase of intravenous contrast administration. Axial 2D reconstructed images and multiplanar 3D MIP reconstructed   images of the head and neck vessels were performed by the technologist. Dose reduction techniques were used. All stenosis measurements made according to NASCET criteria unless otherwise specified.    FINDINGS:     HEAD CTA:  ANTERIOR CIRCULATION: No branch occlusion. The previously seen aneurysm projecting off the left ICA is not visualized on this exam, and there is a prominent infundibulum at the communicating segment of the left ICA. There is moderate stenosis at the   proximal cavernous left ICA, slightly progressed in the interval. No AVM. Fetal origin of the right posterior cerebral artery from the anterior circulation.    POSTERIOR CIRCULATION: No stenosis/occlusion, aneurysm, or high flow vascular malformation. Moderate to severe multifocal stenosis of the left V4 segment. Normal basilar artery.     DURAL VENOUS SINUSES: Expected enhancement of the major dural venous sinuses.    NECK CTA:  RIGHT CAROTID: No measurable stenosis or dissection.    LEFT CAROTID: Mild to moderate eccentric narrowing of the distal left ICA, just below the skull base.    VERTEBRAL ARTERIES: The left vertebral artery is occluded at its origin. There is some attenuated reconstitution at the V2 segment with a patent V3 segment. There is high-grade stenosis at the origin of this vessel on 2/18/2020. Slightly dominant right   vertebral artery is patent throughout its  course.    AORTIC ARCH: Classic aortic arch anatomy with no significant stenosis at the origin of the great vessels.    NONVASCULAR STRUCTURES: Unremarkable.      Impression    IMPRESSION:     HEAD CTA:   1.  No branch occlusion or aneurysm. 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  2.  Moderate focal stenosis at the proximal cavernous left ICA, progressed from prior.  3.  Multifocal moderate to severe stenosis of the left V4 segment, progressed in the interval.    NECK CTA:  1.  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 There is high-grade stenosis at origin of this vessel on 2/18/2020. Attenuated reconstitution at the mid V2 segment with a patent V3 segment.  2.  Mild to moderate eccentric narrowing of the distal left ICA just below the skull base.

## 2021-09-25 NOTE — PROGRESS NOTES
09/25/21 1142   General Information                              SLP Bedside Swallow Evaluation   Onset of Illness/Injury or Date of Surgery 09/24/21   Referring Physician Dr. Haines   Patient/Family Therapy Goal Statement (SLP) To eat ASAP   Pertinent History of Current Problem Per notes: Hx CVA 2/2020 and afib on eliquis. Presents with L sided weakness and numbness, gait instability x 1 week. Sx improving since onset. MRI outpatient through primary demonstrated acute/ subacute R occipital, frontal and parietal lobe infarcts. MRA head with focal short segment occlusion of the R ICA with distal reconstitution. -150's systolic, other vitals stable.    General Observations Pt was alert and cooperative.  Ataxic quality of speech noted at tiimes.   Pt reports no hx of dysphagia.   Oral Motor   Oral Musculature   (Hong pull to R, min L labial asymmetry, coordination decrease - mild)   Dentition (Oral Motor)   Dentition (Oral Motor) adequate dentition   Cough/Swallow/Gag Reflex (Oral Motor)   Comment, Cough/Swallow/Gag Reflex (Oral Motor) Pt reports hypersensitve gag.   Vocal Quality/Secretion Management (Oral Motor)   Comment, Vocal Quality/Secretion Management (Oral Motor) WFL   General Swallowing Observations   Current Diet/Method of Nutritional Intake (General Swallowing Observations, NIS) NPO   Clinical Swallow Eval: Thin Liquid Texture Trial   Mode of Presentation, Thin Liquids cup;straw   Volume of Liquid or Food Presented sips by cup x 4, sips by straw x 5   Oral Phase of Swallow WFL   Pharyngeal Phase of Swallow intact   Diagnostic Statement no signs of aspiration    Clinical Swallow Evaluation: Solid Food Texture Trial   Mode of Presentation self-fed   Volume Presented bites x 5   Oral Phase   (mild oral residue)   Pharyngeal Phase intact   Diagnostic Statement no signs of aspiration, alternatiing cleared residue    Swallowing Recommendations   Diet Consistency Recommendations regular diet;thin  liquids (level 0)   Supervision Level for Intake distant supervision needed   Mode of Delivery Recommendations bolus size, small;slow rate of intake   Swallowing Maneuver Recommendations alternate food and liquid intake   Monitoring/Assistance Required (Eating/Swallowing) monitor for cough or change in vocal quality with intake   Recommended Feeding/Eating Techniques (Swallow Eval) maintain upright sitting position for eating   Medication Administration Recommendations, Swallowing (SLP) Meds with thin liquids   SLP Therapy Assessment/Plan   Criteria for Skilled Therapeutic Interventions Met (SLP Eval) yes   SLP Diagnosis Mild oral-pharyngeal dysphagia and mild-min dysarthria    Rehab Potential (SLP Eval) good, to achieve stated therapy goals   Therapy Frequency (SLP Eval) 3 times/wk   Predicted Duration of Therapy Intervention (SLP Eval) 1 week   Comment, Therapy Assessment/Plan (SLP) Pt presents with mild oral-pharyngeal dysphagia and mild-min dysarthria at bedside.  Deficits include mild-min oral motor deficits and mild oral residue.  No aspiration signs were observed after swallows.  Recommend initiation of regular diet textures and thin liquids with use of safe swallow strategies.  Plan to provide short term SLP swallow Tx to assess diet tolerance and train strategies as indicated.   Therapy Plan Review/Discharge Plan (SLP)   Therapy Plan Review (SLP) evaluation/treatment results reviewed;care plan/treatment goals reviewed;risks/benefits reviewed;current/potential barriers reviewed;participants voiced agreement with care plan;participants included;patient   SLP Discharge Planning    SLP Discharge Recommendation (DC Rec) home   SLP Rationale for DC Rec Anticipate SLP goals will be met as an IP   SLP Brief overview of current status  Mild oral-pharyngeal dysphagia and mild-min dysarthria observed.  Rec: initiate regular diet textures with use of safe swallow strategies    Total Evaluation Time   Total Evaluation  Time (Minutes) 15

## 2021-09-25 NOTE — ED NOTES
[facility]  ED Nurse Handoff Report    ED Chief complaint: Stroke Symptoms      ED Diagnosis:   Final diagnoses:   Acute ischemic stroke (H)   Cervical stenosis of spinal canal   Left sided numbness   Left-sided weakness   Balance problems   Paroxysmal atrial fibrillation (H)       Code Status: Full Code    Allergies: No Known Allergies    Patient Story: abnormal MRI  Focused Assessment:  Patient presents to ED with abnormal outpatient MRI which was positive for stroke. Please see H&P for details.  Will be admitted for further evaluation and obervation.     Treatments and/or interventions provided: No intervention at this time  Patient's response to treatments and/or interventions: NA    To be done/followed up on inpatient unit:  Close monitoring    Does this patient have any cognitive concerns?: na    Activity level - Baseline/Home:  Independent  Activity Level - Current:   Independent    Patient's Preferred language: English   Needed?: No    Isolation: None  Infection: Not Applicable  Patient tested for COVID 19 prior to admission: YES  Bariatric?: No    Vital Signs:   Vitals:    09/24/21 1842 09/24/21 1843 09/24/21 1930   BP:  (!) 154/77    Pulse: 96     Resp: 18     Temp: 97.9  F (36.6  C)     TempSrc: Temporal     SpO2: 96%  98%       Cardiac Rhythm:     Was the PSS-3 completed:   Yes  What interventions are required if any?               Family Comments: na  OBS brochure/video discussed/provided to patient/family: No              Name of person given brochure if not patient: na              Relationship to patient: na    For the majority of the shift this patient's behavior was Green.   Behavioral interventions performed were none.    ED NURSE PHONE NUMBER: [phone]

## 2021-09-25 NOTE — PROGRESS NOTES
RECEIVING UNIT ED HANDOFF REVIEW    ED Nurse Handoff Report was reviewed by: Jenae Cosby RN on September 25, 2021 at 11:56 AM

## 2021-09-26 ENCOUNTER — APPOINTMENT (OUTPATIENT)
Dept: OCCUPATIONAL THERAPY | Facility: CLINIC | Age: 66
DRG: 066 | End: 2021-09-26
Attending: INTERNAL MEDICINE
Payer: COMMERCIAL

## 2021-09-26 ENCOUNTER — APPOINTMENT (OUTPATIENT)
Dept: PHYSICAL THERAPY | Facility: CLINIC | Age: 66
DRG: 066 | End: 2021-09-26
Attending: INTERNAL MEDICINE
Payer: COMMERCIAL

## 2021-09-26 ENCOUNTER — APPOINTMENT (OUTPATIENT)
Dept: CARDIOLOGY | Facility: CLINIC | Age: 66
DRG: 066 | End: 2021-09-26
Attending: INTERNAL MEDICINE
Payer: COMMERCIAL

## 2021-09-26 LAB
APTT PPP: 181 SECONDS (ref 22–38)
APTT PPP: 44 SECONDS (ref 22–38)
APTT PPP: 56 SECONDS (ref 22–38)
APTT PPP: 66 SECONDS (ref 22–38)
GLUCOSE BLDC GLUCOMTR-MCNC: 106 MG/DL (ref 70–99)
GLUCOSE BLDC GLUCOMTR-MCNC: 129 MG/DL (ref 70–99)
GLUCOSE BLDC GLUCOMTR-MCNC: 154 MG/DL (ref 70–99)
GLUCOSE BLDC GLUCOMTR-MCNC: 189 MG/DL (ref 70–99)
GLUCOSE BLDC GLUCOMTR-MCNC: 98 MG/DL (ref 70–99)
LVEF ECHO: NORMAL
MAGNESIUM SERPL-MCNC: 2.1 MG/DL (ref 1.6–2.3)
POTASSIUM BLD-SCNC: 4 MMOL/L (ref 3.4–5.3)

## 2021-09-26 PROCEDURE — 250N000011 HC RX IP 250 OP 636

## 2021-09-26 PROCEDURE — 97530 THERAPEUTIC ACTIVITIES: CPT | Mod: GP

## 2021-09-26 PROCEDURE — 97161 PT EVAL LOW COMPLEX 20 MIN: CPT | Mod: GP

## 2021-09-26 PROCEDURE — 120N000001 HC R&B MED SURG/OB

## 2021-09-26 PROCEDURE — 36415 COLL VENOUS BLD VENIPUNCTURE: CPT | Performed by: INTERNAL MEDICINE

## 2021-09-26 PROCEDURE — 84132 ASSAY OF SERUM POTASSIUM: CPT | Performed by: INTERNAL MEDICINE

## 2021-09-26 PROCEDURE — 97112 NEUROMUSCULAR REEDUCATION: CPT | Mod: GO

## 2021-09-26 PROCEDURE — 93306 TTE W/DOPPLER COMPLETE: CPT

## 2021-09-26 PROCEDURE — 97110 THERAPEUTIC EXERCISES: CPT | Mod: GP

## 2021-09-26 PROCEDURE — 85730 THROMBOPLASTIN TIME PARTIAL: CPT | Performed by: INTERNAL MEDICINE

## 2021-09-26 PROCEDURE — 97535 SELF CARE MNGMENT TRAINING: CPT | Mod: GO

## 2021-09-26 PROCEDURE — 97166 OT EVAL MOD COMPLEX 45 MIN: CPT | Mod: GO

## 2021-09-26 PROCEDURE — 250N000013 HC RX MED GY IP 250 OP 250 PS 637: Performed by: INTERNAL MEDICINE

## 2021-09-26 PROCEDURE — 97116 GAIT TRAINING THERAPY: CPT | Mod: GP

## 2021-09-26 PROCEDURE — 83735 ASSAY OF MAGNESIUM: CPT | Performed by: INTERNAL MEDICINE

## 2021-09-26 PROCEDURE — 93306 TTE W/DOPPLER COMPLETE: CPT | Mod: 26 | Performed by: INTERNAL MEDICINE

## 2021-09-26 PROCEDURE — 99232 SBSQ HOSP IP/OBS MODERATE 35: CPT | Performed by: INTERNAL MEDICINE

## 2021-09-26 RX ADMIN — SENNOSIDES AND DOCUSATE SODIUM 1 TABLET: 8.6; 5 TABLET ORAL at 19:51

## 2021-09-26 RX ADMIN — METOPROLOL TARTRATE 25 MG: 25 TABLET, FILM COATED ORAL at 08:11

## 2021-09-26 RX ADMIN — PANTOPRAZOLE SODIUM 40 MG: 40 TABLET, DELAYED RELEASE ORAL at 16:02

## 2021-09-26 RX ADMIN — PANTOPRAZOLE SODIUM 40 MG: 40 TABLET, DELAYED RELEASE ORAL at 06:30

## 2021-09-26 RX ADMIN — METOPROLOL TARTRATE 25 MG: 25 TABLET, FILM COATED ORAL at 19:51

## 2021-09-26 RX ADMIN — HEPARIN SODIUM 950 UNITS/HR: 10000 INJECTION, SOLUTION INTRAVENOUS at 15:50

## 2021-09-26 RX ADMIN — ROSUVASTATIN CALCIUM 5 MG: 5 TABLET, FILM COATED ORAL at 08:11

## 2021-09-26 RX ADMIN — HEPARIN SODIUM 800 UNITS/HR: 10000 INJECTION, SOLUTION INTRAVENOUS at 04:56

## 2021-09-26 RX ADMIN — SENNOSIDES AND DOCUSATE SODIUM 1 TABLET: 8.6; 5 TABLET ORAL at 08:11

## 2021-09-26 ASSESSMENT — ACTIVITIES OF DAILY LIVING (ADL)
ADLS_ACUITY_SCORE: 8
ADLS_ACUITY_SCORE: 9
ADLS_ACUITY_SCORE: 8
ADLS_ACUITY_SCORE: 7

## 2021-09-26 NOTE — PHARMACY
Pharmacy Consult to evaluate for medication related stroke core measures    Anson JOSH Jose Maria, 65 year old male admitted for stroke on 9/24/2021.    Thrombolytic was not given because of on apixaban    VTE Prophylaxis Heparin given on 9/24 as appropriate prior to end of hospital day 2.    Antithrombotic: heparin started on 9/24, as appropriate by end of hospital day 2. Continue antithrombotic therapy on discharge to meet quality measures, unless contraindicated.    Anticoagulation if history of A-fib/flutter: Patient on heparin; continue anticoagulation on discharge to meet quality measures, unless contraindicated.    LDL Cholesterol Calculated   Date Value Ref Range Status   09/25/2021 73 <=100 mg/dL Final   02/19/2020 46 <100 mg/dL Final     Comment:     Desirable:       <100 mg/dl       Patient's home statin, Crestor (rosuvastatin) restarted; continue statin on discharge to meet quality measures, unless contraindicated.     Recommendations: None at this time    Thank you for the consult.    Radha Barker RPH 9/25/2021 8:44 PM

## 2021-09-26 NOTE — PROGRESS NOTES
"Brief progress Stroke note:     Stopped in to see patient this afternoon. Patient reports continued left sided symptoms, with some improvement in LUE. Patient updated on plan. Sister at bedside. Questions answered.     Plan: (ADDENDUM)  -Tentatively scheduled for diagnostic angiogram tomorrow  -Allow permissive HTN, goal SBP < 180-Continue heparin gtt for now   -Continue PTA statin   -Q4 Neuro checks   -Therapies as needed   -Outpatient sleep study after discharge   -Continued smoking cessation   -A1c within goal range <7.0  -PLC     We will continue to follow     Tamika Frank PA-C  Neurology   9/26/2021 at 3:07 PM  To page me or covering stroke neurology team member, click here: [facility]COM  Choose \"On Call\" tab at top, then search dropdown box for \"Neurology Adult\" & press Enter, look for Neuro ICU/Stroke         "

## 2021-09-26 NOTE — PLAN OF CARE
Pt here with R CVA. A&Ox4. Neuros intact except LUE and LLE 4/5, L pronator drift and ataxia, baseline BLE numbness. VSS. Tele NSR. Regular diet, thin liquids. Takes pills whole. Up with assist x1, GB and cane. Denies pain. Heparin drip running at 800 units/hour, recheck at 0800. Pt scoring green on the Aggression Stop Light Tool. Plan complete stroke workup. Discharge pending.

## 2021-09-26 NOTE — PROGRESS NOTES
09/26/21 0759   Quick Adds   Type of Visit Initial PT Evaluation   Living Environment   People in home alone   Current Living Arrangements house   Home Accessibility stairs to enter home   Number of Stairs, Main Entrance 3   Stair Railings, Main Entrance none   Transportation Anticipated car, drives self;family or friend will provide   Living Environment Comments Sister lives in town and can help if needed. All needs met on main level.    Self-Care   Usual Activity Tolerance good   Current Activity Tolerance moderate   Activity/Exercise/Self-Care Comment Works part time from home (semi-retired). At baseline patient uses a cane and a  upper.    Disability/Function   Fall history within last six months yes   Number of times patient has fallen within last six months 1  (making bed & tripped on blankets on floor)   Change in Functional Status Since Onset of Current Illness/Injury yes   General Information   Onset of Illness/Injury or Date of Surgery 09/25/21   Referring Physician Des Haines MD   Patient/Family Therapy Goals Statement (PT) To return to home   Pertinent History of Current Problem (include personal factors and/or comorbidities that impact the POC) 65 year old male who presents with L sided weakness. Hx CVA 2/2020 and afib on eliquis. Presented with L sided weakness and numbness, gait instability x 1 week. Symptoms have been improved but still present.  MRI outpatient through primary demonstrated acute/subacute R occipital, frontal and parietal lobe infarcts. MRA head with focal short segment occlusion of the R ICA with distal reconstitution.   Existing Precautions/Restrictions fall   General Observations Greeted patient supine in bed.   Cognition   Orientation Status (Cognition) oriented x 4   Affect/Mental Status (Cognition) WFL   Follows Commands (Cognition) WFL   Pain Assessment   Patient Currently in Pain No   Integumentary/Edema   Integumentary/Edema no deficits were identifed    Posture    Posture Forward head position;Protracted shoulders   Range of Motion (ROM)   ROM Comment B LE WFL   Strength   Strength Comments L sided weakness greater; generalized deconditioning. Unable to perform sit <> stand without UE support   Bed Mobility   Comment (Bed Mobility) supine <> EOB at SBA   Transfers   Transfer Safety Comments sit <> stand with single point cane at CGA-SBA   Gait/Stairs (Locomotion)   Comment (Gait/Stairs) 10ft with single point cane at [facility]-SBA; unsteady, decreased michelle/stride   Balance   Balance Comments requires use of assistive equipement, unsteady   Sensory Examination   Sensory Perception Comments baseline numbness   Coordination   Coordination Comments pronator drift present on L arm; finger to nose apraxia present   Clinical Impression   Criteria for Skilled Therapeutic Intervention yes, treatment indicated   PT Diagnosis (PT) impaired functional mobility   Influenced by the following impairments L sided weakness; generalized deconditioning; impaired balance; impaired UE coordination   Functional limitations due to impairments transfers, ambulation, stairs   Clinical Presentation Stable/Uncomplicated   Clinical Presentation Rationale PMH, current presentation, clinical judgement   Clinical Decision Making (Complexity) low complexity   Therapy Frequency (PT) Daily   Predicted Duration of Therapy Intervention (days/wks) 3 days   Planned Therapy Interventions (PT) balance training;bed mobility training;gait training;home exercise program;neuromuscular re-education;patient/family education;stair training;strengthening;transfer training;progressive activity/exercise   Risk & Benefits of therapy have been explained evaluation/treatment results reviewed;care plan/treatment goals reviewed;patient   PT Discharge Planning    PT Discharge Recommendation (DC Rec) home with assist;home with outpatient physical therapy   PT Rationale for DC Rec PT - Patient demonstrates necessary level  of functional mobility with use of cane in order to return to home environment. Patient is a high fall risk and recommended to receive services for yard work which patient had previously been doing on his own. Patient is recommended to continue with skilled OP-PT in order to improve LE strength, balance and activity tolerance in order to return to baseline mobility.    Total Evaluation Time   Total Evaluation Time (Minutes) 10

## 2021-09-26 NOTE — PROGRESS NOTES
09/26/21 1211   Quick Adds   Type of Visit Initial Occupational Therapy Evaluation   Living Environment   People in home alone   Current Living Arrangements house   Home Accessibility stairs to enter home   Number of Stairs, Main Entrance 3   Stair Railings, Main Entrance none   Transportation Anticipated car, drives self   Living Environment Comments pt's sister lives in town and can assist prn   Self-Care   Usual Activity Tolerance good   Current Activity Tolerance moderate   Activity/Exercise/Self-Care Comment Works part time from home (semi-retired). At baseline patient uses a cane and a reacher   Disability/Function   Fall history within last six months yes   Number of times patient has fallen within last six months 1   Change in Functional Status Since Onset of Current Illness/Injury yes   General Information   Onset of Illness/Injury or Date of Surgery 09/24/21   Referring Physician Des Haines MD   Patient/Family Therapy Goal Statement (OT) return home   Additional Occupational Profile Info/Pertinent History of Current Problem 65 year old male who presents with L sided weakness. Hx CVA 2/2020 and afib on eliquis. Presented with L sided weakness and numbness, gait instability x 1 week. Symptoms have been improved but still present.  MRI outpatient through primary demonstrated acute/subacute R occipital, frontal and parietal lobe infarcts. MRA head with focal short segment occlusion of the R ICA with distal reconstitution.   Existing Precautions/Restrictions fall   Cognitive Status Examination   Cognitive Status Comments pt two days off on specific date. will complete cog screen 2' CVA diagnosis   Visual Perception   Visual Motor Impairment accommodation;visual tracking, down;visual tracking, left;visual tracking, right;visual tracking, up   Impact of Vision Impairment on Function (Vision) Impaired occulomotor skills, uncoordinated visual tracking in all directions, lots of slips   Sensory    Sensory Comments LUE intact to light touch   Pain Assessment   Patient Currently in Pain No   Range of Motion Comprehensive   Comment, General Range of Motion BUE AROM WFL   Strength Comprehensive (MMT)   Comment, General Manual Muscle Testing (MMT) Assessment BUE strength WFL   Coordination   Upper Extremity Coordination Left UE impaired   Fine Motor Coordination Impaired L hand coordination   Functional Limitations Decreased speed;Fine motor ADL performance impaired;Reach to targets impaired;Impaired ability to perform bilateral tasks   Bed Mobility   Comment (Bed Mobility) ind   Transfers   Transfer Comments ind   Balance   Balance Comments no overt LOB w/ use of cane   Activities of Daily Living   BADL Assessment bathing;upper body dressing;lower body dressing;grooming;feeding;toileting   Bathing Assessment   [place] Level (Bathing) minimum assist (75% patient effort)   Upper Body Dressing Assessment   [place] Level (Upper Body Dressing) independent   Lower Body Dressing Assessment   [place] Level (Lower Body Dressing) minimum assist (75% patient effort)   Grooming Assessment   [place] Level (Grooming) independent   Eating/Self Feeding   [place] Level (Feeding) independent   Toileting   [place] Level (Toileting) independent   Clinical Impression   Criteria for Skilled Therapeutic Interventions Met (OT) yes   OT Diagnosis dec ind/safety w/ ADL's and IADL's   OT Problem List-Impairments impacting ADL cognition;coordination;mobility;motor control;vision   Assessment of Occupational Performance 3-5 Performance Deficits   Identified Performance Deficits dec ind/safety w/ g/h, bathing, toileting, dressing, IADL's   Planned Therapy Interventions (OT) ADL retraining;IADL retraining;cognition;home program guidelines;progressive activity/exercise;motor coordination training;fine motor coordination training   Clinical Decision Making Complexity (OT) moderate complexity   Therapy Frequency  (OT) Daily   Predicted Duration of Therapy 3 days   Anticipated Equipment Needs Upon Discharge (OT) cane, straight   Risk & Benefits of therapy have been explained care plan/treatment goals reviewed;evaluation/treatment results reviewed;participants included;patient   OT Discharge Planning    OT Discharge Recommendation (DC Rec) Home with assist;home with outpatient occupational therapy   OT Rationale for DC Rec Rec spv initially w/ bathing and A for IADL's such as yardwork, heavier cleaning tasks, med mgmt, and transportation. Rec no driving until after cleared by MD and OP OT 2' LUE, visual-motor, and cognitive impairments.   Total Evaluation Time (Minutes)   Total Evaluation Time (Minutes) 8

## 2021-09-26 NOTE — PROGRESS NOTES
[facility]    Medicine Progress Note - Hospitalist Service       Date of Admission:  9/24/2021    Assessment & Plan             Anson Moise is a 65 year old male who presents with L sided weakness     CVA  Hx CVA 2/2020 and afib on eliquis. Presented with L sided weakness and numbness, gait instability x 1 week. Symptoms have been improved but still present.  MRI outpatient through primary demonstrated acute/subacute R occipital, frontal and parietal lobe infarcts. MRA head with focal short segment occlusion of the R ICA with distal reconstitution.  * HgbA1c is 6.2  * Echocardiogram:  A cardiac source of embolus was not identified.  There is no atrial shunt seen.  Sinus rhythm was noted.  Left ventricular systolic function is normal.  The visual ejection fraction is 60-65%.  The left ventricle is normal in size.  Doppler interrogation does not demonstrate signficant stenosis or insufficiency involving cardiac valves.  No significant change since last study 2/18/2020. A buble study was not  performed.  - Monitor on telemetry   - Q2h hour vitals, neuro checks per neuro.  Will defer de-escalating to them   - Permissive hypertension with goal <180  - Holding PTA apixaban for now.  Will anticoagulate with heparin drip.  Resume Eliquis once able   - Lipids, continue home rosuvastatin   - PT/OT/SLP  - Neurology following and appreciate their recommendations.  Sounds as if considering angiogram tomorrow      Severe spinal canal narrowing C4-5, C5-6  Severe R neural foraminal narrowing at C4-5  Noted on admitting MRI (done through [facility]).   - Neurosurgery follow up outpatient if indicated     H/o A fib with RVR  HTN  HLD  [apixaban 5 mg BID, hydrochlorothiazide 25 mg daily, lisinopril 20 mg daily, metoprolol 25 mg BID, rosuvastatin 5 mg daily]  - Holding antihypertensives as above   - Continue metoprolol 25 mg BID  - Holding apixaban as above  - Rosuvastatin      SANTHOSH. Improved   Baseline  creatinine ~1.1-1.2. On presentation at 1.43.  Down to 1.23 on recheck   - Avoid nephrotoxins as able   - Monitor   - Stopped IVF      DM II  [glipizide XL 5 mg daily, metformin 500 mg BID]  Hgb A1C on day of admission 6.2%.   - Holding orals  - BID and HS blood sugars.  No indication for SSI at this time      GERD  [pantoprazole 40 mg BID]  - PPI         Diet: Combination Diet Regular Diet Adult; Thin Liquids (level 0); Consistent Carb 60 Grams CHO per Meal Diet    DVT Prophylaxis: Heparin drip   Chilel Catheter: Not present  Central Lines: None  Code Status: No CPR- Do NOT Intubate      Disposition Plan   Expected discharge: 09/27/2021   recommended to prior living arrangement once neurology evaluation complete.     The patient's care was discussed with the Patient.    Mk Pierre DO  Hospitalist Service  [facility]  Securely message with the Vocera Web Console (learn more here)  Text page via [facility] Paging/Directory      Clinically Significant Risk Factors Present on Admission               ______________________________________________________________________    Interval History   Patient seen and examined.  No acute events over night.  No fevers or chills.  No new neurologic symptoms.  No pain at this time.  No difficulty breathing.     Data reviewed today: I reviewed all medications, new labs and imaging results over the last 24 hours. I personally reviewed no images or EKG's today.    Physical Exam   Vital Signs: Temp: 98.1  F (36.7  C) Temp src: Oral BP: 138/80 Pulse: 68   Resp: 16 SpO2: 95 % O2 Device: None (Room air)    Weight: 197 lbs 8 oz  General Appearance: Resting comfortably.  NAD   Respiratory: Clear to auscultation.  No respiratory distress  Cardiovascular: RRR.  No obvious murmurs  GI: Soft. Non-distended  Skin: No obvious rashes or cyanosis   Other: No edema. No calf tenderness      Data   Recent Labs   Lab 09/26/21  1212 09/26/21  0743 09/26/21  0444 09/26/21  0210  21  1716 21  0558 21   WBC  --   --   --   --   --  6.3  --  7.2   HGB  --   --   --   --   --  14.9  --  14.7   MCV  --   --   --   --   --  90  --  90   PLT  --   --   --   --   --  207  --  208   NA  --   --   --   --   --  135  --  135   POTASSIUM  --   --  4.0  --   --  4.0  --  4.3   CHLORIDE  --   --   --   --   --  102  --  102   CO2  --   --   --   --   --  25  --  26   BUN  --   --   --   --   --  20  --  23   CR  --   --   --   --   --  1.23  --  1.43*   ANIONGAP  --   --   --   --   --  8  --  7   SUZANNE  --   --   --   --   --  9.4  --  9.2   * 106*  --  98   < > 95   < > 101*   TROPONIN  --   --   --   --   --  <0.015  --  <0.015    < > = values in this interval not displayed.     Recent Results (from the past 24 hour(s))   Echocardiogram Complete - For age > 60 yrs   Result Value    LVEF  60-65%    Narrative    [id number]  [id number]  [id number]  [id number]^YESSI^ALBERTO^CORAL     [facility]  Echocardiography Laboratory  [address] [place] [id number]     Name: DIANA MACKENZIE  MRN: [id number]  : 1955  Study Date: 2021 08:53 AM  Age: 65 yrs  Gender: Male  Patient Location: [facility]  Reason For Study: Cerebrovascular Incident  Ordering Physician: ALBERTO DICKSON  Performed By: Yael Duff     BSA: 2.1 m2  Height: 69 in  Weight: 197 lb  HR: 87  ______________________________________________________________________________  Procedure  Complete Portable Echo Adult.  ______________________________________________________________________________  Interpretation Summary     A cardiac source of embolus was not identified  There is no atrial shunt seen.  Sinus rhythm was noted.  Left ventricular systolic function is normal.  The visual ejection fraction is 60-65%.  The left ventricle is normal in size.  Doppler interrogation does not demonstrate signficant stenosis or  insufficiency involving cardiac valves.     No  significant change since last study 2/18/2020. A buble study was not  performed.  ______________________________________________________________________________  Left Ventricle  The left ventricle is normal in size. There is normal left ventricular wall  thickness. Left ventricular systolic function is normal. The visual ejection  fraction is 60-65%. Grade I or early diastolic dysfunction. No regional wall  motion abnormalities noted. There is no thrombus seen in the left ventricle.     Right Ventricle  The right ventricle is normal in structure, function and size. There is no  mass or thrombus in the right ventricle.     Atria  Normal left atrial size. Right atrial size is normal. There is no atrial shunt  seen. The left atrial appendage is not well visualized.     Mitral Valve  The mitral valve leaflets appear normal. There is no evidence of stenosis,  fluttering, or prolapse. There is no mitral regurgitation noted. There is no  mitral valve stenosis.     Tricuspid Valve  Normal tricuspid valve. No tricuspid regurgitation. Right ventricular systolic  pressure could not be approximated due to inadequate tricuspid regurgitation.  There is no tricuspid stenosis.     Aortic Valve  The aortic valve is trileaflet. No aortic regurgitation is present. No aortic  stenosis is present.     Pulmonic Valve  Normal pulmonic valve. There is no pulmonic valvular regurgitation. There is  no pulmonic valvular stenosis.     Vessels  The aortic root is normal size. Normal size ascending aorta. The inferior vena  cava is normal. The pulmonary artery is normal size.     Pericardium  The pericardium appears normal. There is no pleural effusion.     Rhythm  Sinus rhythm was noted.  ______________________________________________________________________________  MMode/2D Measurements & Calculations  IVSd: 1.0 cm  LVIDd: 4.1 cm  LVIDs: 2.6 cm  LVPWd: 1.1 cm  FS: 36.9 %  LV mass(C)d: 143.6 grams  LV mass(C)dI: 69.9 grams/m2  Ao root diam:  3.7 cm  LA dimension: 3.5 cm  asc Aorta Diam: 3.3 cm  LA/Ao: 0.93  LA Volume (BP): 39.3 ml  LA Volume Index (BP): 19.2 ml/m2     RWT: 0.52     Doppler Measurements & Calculations  MV E max teofilo: 56.3 cm/sec  MV A max teofilo: 80.0 cm/sec  MV E/A: 0.70  MV dec slope: 185.8 cm/sec2  MV dec time: 0.30 sec  PA acc time: 0.12 sec  E/E' av.7  Lateral E/e': 6.2  Medial E/e': 9.1     ______________________________________________________________________________  Report approved by: Dr. Vladimir Javed 2021 10:59 AM

## 2021-09-26 NOTE — PLAN OF CARE
0700-1900: Pt here with R ANICETO's. A&Ox4. Neuros show LUE numbness and weakness, LLE numbness and weakness. VSS on room air. Tele SR. Mod carb diet, thin liquids. Takes pills whole with water. Up with Ax1, GB, cane. Denies pain. Voiding in bathroom, no BM today. Possible plan for cerebral angio tomorrow. Pt scoring green on the Aggression Stop Light Tool. Discharge plan pending angio results.

## 2021-09-27 ENCOUNTER — TELEPHONE (OUTPATIENT)
Dept: NEUROLOGY | Facility: CLINIC | Age: 66
End: 2021-09-27

## 2021-09-27 ENCOUNTER — APPOINTMENT (OUTPATIENT)
Dept: PHYSICAL THERAPY | Facility: CLINIC | Age: 66
DRG: 066 | End: 2021-09-27
Payer: COMMERCIAL

## 2021-09-27 ENCOUNTER — APPOINTMENT (OUTPATIENT)
Dept: OCCUPATIONAL THERAPY | Facility: CLINIC | Age: 66
DRG: 066 | End: 2021-09-27
Payer: COMMERCIAL

## 2021-09-27 VITALS
TEMPERATURE: 97.7 F | HEART RATE: 69 BPM | HEIGHT: 69 IN | SYSTOLIC BLOOD PRESSURE: 153 MMHG | DIASTOLIC BLOOD PRESSURE: 82 MMHG | WEIGHT: 197.5 LBS | RESPIRATION RATE: 16 BRPM | OXYGEN SATURATION: 95 % | BODY MASS INDEX: 29.25 KG/M2

## 2021-09-27 LAB
APTT PPP: 29 SECONDS (ref 22–38)
APTT PPP: 81 SECONDS (ref 22–38)
GLUCOSE BLDC GLUCOMTR-MCNC: 124 MG/DL (ref 70–99)
MAGNESIUM SERPL-MCNC: 2 MG/DL (ref 1.6–2.3)
POTASSIUM BLD-SCNC: 3.8 MMOL/L (ref 3.4–5.3)

## 2021-09-27 PROCEDURE — 258N000003 HC RX IP 258 OP 636: Performed by: STUDENT IN AN ORGANIZED HEALTH CARE EDUCATION/TRAINING PROGRAM

## 2021-09-27 PROCEDURE — 250N000013 HC RX MED GY IP 250 OP 250 PS 637: Performed by: INTERNAL MEDICINE

## 2021-09-27 PROCEDURE — 97530 THERAPEUTIC ACTIVITIES: CPT | Mod: GO | Performed by: OCCUPATIONAL THERAPIST

## 2021-09-27 PROCEDURE — 99239 HOSP IP/OBS DSCHRG MGMT >30: CPT | Performed by: INTERNAL MEDICINE

## 2021-09-27 PROCEDURE — 36415 COLL VENOUS BLD VENIPUNCTURE: CPT | Performed by: INTERNAL MEDICINE

## 2021-09-27 PROCEDURE — 250N000011 HC RX IP 250 OP 636

## 2021-09-27 PROCEDURE — 250N000013 HC RX MED GY IP 250 OP 250 PS 637: Performed by: STUDENT IN AN ORGANIZED HEALTH CARE EDUCATION/TRAINING PROGRAM

## 2021-09-27 PROCEDURE — 85730 THROMBOPLASTIN TIME PARTIAL: CPT | Performed by: INTERNAL MEDICINE

## 2021-09-27 PROCEDURE — 97116 GAIT TRAINING THERAPY: CPT | Mod: GP | Performed by: PHYSICAL THERAPIST

## 2021-09-27 PROCEDURE — 97112 NEUROMUSCULAR REEDUCATION: CPT | Mod: GP | Performed by: PHYSICAL THERAPIST

## 2021-09-27 PROCEDURE — 83735 ASSAY OF MAGNESIUM: CPT | Performed by: INTERNAL MEDICINE

## 2021-09-27 PROCEDURE — 84132 ASSAY OF SERUM POTASSIUM: CPT | Performed by: INTERNAL MEDICINE

## 2021-09-27 PROCEDURE — 99233 SBSQ HOSP IP/OBS HIGH 50: CPT | Mod: GC | Performed by: PSYCHIATRY & NEUROLOGY

## 2021-09-27 RX ORDER — NALOXONE HYDROCHLORIDE 0.4 MG/ML
0.4 INJECTION, SOLUTION INTRAMUSCULAR; INTRAVENOUS; SUBCUTANEOUS
Status: CANCELLED | OUTPATIENT
Start: 2021-09-27

## 2021-09-27 RX ORDER — FLUMAZENIL 0.1 MG/ML
0.2 INJECTION, SOLUTION INTRAVENOUS
Status: CANCELLED | OUTPATIENT
Start: 2021-09-27

## 2021-09-27 RX ORDER — FENTANYL CITRATE 50 UG/ML
25-50 INJECTION, SOLUTION INTRAMUSCULAR; INTRAVENOUS EVERY 5 MIN PRN
Status: CANCELLED | OUTPATIENT
Start: 2021-09-27

## 2021-09-27 RX ORDER — LIDOCAINE 40 MG/G
CREAM TOPICAL
Status: DISCONTINUED | OUTPATIENT
Start: 2021-09-27 | End: 2021-09-27

## 2021-09-27 RX ORDER — NALOXONE HYDROCHLORIDE 0.4 MG/ML
0.2 INJECTION, SOLUTION INTRAMUSCULAR; INTRAVENOUS; SUBCUTANEOUS
Status: CANCELLED | OUTPATIENT
Start: 2021-09-27

## 2021-09-27 RX ORDER — HEPARIN SODIUM 200 [USP'U]/100ML
1 INJECTION, SOLUTION INTRAVENOUS CONTINUOUS PRN
Status: CANCELLED | OUTPATIENT
Start: 2021-09-27

## 2021-09-27 RX ORDER — ASPIRIN 81 MG/1
81 TABLET ORAL DAILY
Status: DISCONTINUED | OUTPATIENT
Start: 2021-09-27 | End: 2021-09-27 | Stop reason: HOSPADM

## 2021-09-27 RX ORDER — ROSUVASTATIN CALCIUM 5 MG/1
10 TABLET, COATED ORAL DAILY
Status: DISCONTINUED | OUTPATIENT
Start: 2021-09-28 | End: 2021-09-27 | Stop reason: HOSPADM

## 2021-09-27 RX ORDER — SODIUM CHLORIDE 9 MG/ML
INJECTION, SOLUTION INTRAVENOUS CONTINUOUS
Status: DISCONTINUED | OUTPATIENT
Start: 2021-09-27 | End: 2021-09-27 | Stop reason: HOSPADM

## 2021-09-27 RX ADMIN — PANTOPRAZOLE SODIUM 40 MG: 40 TABLET, DELAYED RELEASE ORAL at 16:34

## 2021-09-27 RX ADMIN — PANTOPRAZOLE SODIUM 40 MG: 40 TABLET, DELAYED RELEASE ORAL at 08:51

## 2021-09-27 RX ADMIN — HEPARIN SODIUM 750 UNITS/HR: 10000 INJECTION, SOLUTION INTRAVENOUS at 08:43

## 2021-09-27 RX ADMIN — SODIUM CHLORIDE: 9 INJECTION, SOLUTION INTRAVENOUS at 11:01

## 2021-09-27 RX ADMIN — SENNOSIDES AND DOCUSATE SODIUM 1 TABLET: 8.6; 5 TABLET ORAL at 08:51

## 2021-09-27 RX ADMIN — ASPIRIN 81 MG: 81 TABLET, DELAYED RELEASE ORAL at 16:34

## 2021-09-27 RX ADMIN — METOPROLOL TARTRATE 25 MG: 25 TABLET, FILM COATED ORAL at 08:51

## 2021-09-27 RX ADMIN — ROSUVASTATIN CALCIUM 5 MG: 5 TABLET, FILM COATED ORAL at 08:51

## 2021-09-27 ASSESSMENT — ACTIVITIES OF DAILY LIVING (ADL)
ADLS_ACUITY_SCORE: 8

## 2021-09-27 NOTE — PROVIDER NOTIFICATION
MD paged: 525 Anson Moise. Any more orders to place for this patient before we discharge? Also, is his neurology appointment set up? Thanks, Kulwinder -[phone]

## 2021-09-27 NOTE — PROGRESS NOTES
[facility]    Stroke Progress Note    Interval Events  No acute events report overnight. Neuro exam stable. Diagnostic angiogram scheduled for this afternoon.   HPI Summary  Anson Moise is a 65 year old male with PMH of atrial fibrillation on Eliquis, hx of stroke, T2DM, HTN, who presented to the emergency department on 9/24/2021 after an abnormal results on a MRI that was ordered as an outpatient. Patient reported new left-sided weakness/numbness with associated gait instability that started one week prior to presentation. He initially was evaluated by his PCP who ordered an MRI through an outside facility. MRI at outside hospital revealed right cerebral hemisphere infarcts involving the frontal and parietal lobes.  MRI head and neck notable for focal short segment occlusion of right ICA.  Repeat vessel imaging was obtained upon arrival to [facility].     Stroke Evaluation Summarized  MRI/Head CT per HPI   Intracranial Vasculature CTA head with moderate focal stenosis at the proximal cavernous left ICA, severe multifocal stenosis of left V4. MRA head from OSH demonstrated short segment right ICA occlusion in petrous segment. 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 There is high-grade stenosis at origin of this vessel on 2/18/2020. Attenuated reconstitution at the mid V2 segment with a patent V3 segment.      Echocardiogram EF 60-65%, normal left and right atrial size, no atrial shunt reported   EKG/Telemetry SR    Other Testing Not Applicable      LDL  9/25/2021: 73 mg/dL   A1C  6.2   Troponin 9/25/2021: <0.015 ug/L      Impression  65 year old male with history of stroke and atrial fibrillation on Eliquis PTA with new left sided weakness/numbness. MRI brain reveals acute/subacute infarcts within the right cerebral hemisphere. MRA head from OSH demonstrated short segment right ICA occlusion in  "petrous segment    Plan  -Angiogram scheduled for this afternoon   --Heparin gtt held for procedure  -LDL 73, with goal LDL 40-70. Increase Crestor to 10 mg daily   -Goal SBP < 180, neuro exam has remained stable with reported SBP ranging in 120-160's.   -A1c within goal range    -Therapies as needed   -Continued smoking cessation   -Outpatient sleep study after discharge     Patient Follow-up    - final recommendation pending work-up    We will continue to follow.     Tamika Frank PA-C   Neurology  To page me or covering stroke neurology team member, click here: [facility]COM   Choose \"On Call\" tab at top, then search dropdown box for \"Neurology Adult\", select location, press Enter, then look for stroke/neuro ICU/telestroke.  ______________________________________________________    Clinically Significant Risk Factors Present on Admission                 Medications   Scheduled Meds    metoprolol tartrate  25 mg Oral BID     pantoprazole  40 mg Oral BID AC     rosuvastatin  5 mg Oral Daily     senna-docusate  1-2 tablet Oral or NG Tube BID     sodium chloride (PF)  3 mL Intracatheter Q8H       Infusion Meds    heparin 750 Units/hr (09/27/21 0550)     - MEDICATION INSTRUCTIONS -       - MEDICATION INSTRUCTIONS -       - MEDICATION INSTRUCTIONS -       sodium chloride         PRN Meds  lidocaine 4%, lidocaine (buffered or not buffered), magnesium hydroxide, - MEDICATION INSTRUCTIONS -, - MEDICATION INSTRUCTIONS -, ondansetron **OR** ondansetron, - MEDICATION INSTRUCTIONS -, sodium chloride (PF), sodium chloride       PHYSICAL EXAMINATION  Temp:  [97.7  F (36.5  C)-98.6  F (37  C)] 98  F (36.7  C)  Pulse:  [60-88] 60  Resp:  [16-18] 18  BP: (135-168)/(53-80) 140/75  SpO2:  [95 %-96 %] 95 %      Neurologic  Mental Status:  alert, oriented x 3, follows commands, speech clear and fluent  Cranial Nerves:  EOMI with normal smooth pursuit, facial movements symmetric, hearing not formally tested but intact to conversation, no " dysarthria  Motor:  normal muscle tone and bulk, no abnormal movements, able to move all limbs spontaneously, + drift in LUE, improved strength in proximal/distal muscle strength   Reflexes:  deferred  Sensory:  decreased sensation in LLE   Coordination: continued incoordination noted in LLE   Station/Gait:  deferred    Imaging  I personally reviewed all imaging; relevant findings per HPI.     Lab Results Data   CBC  Recent Labs   Lab 09/25/21 0558 09/24/21 1938   WBC 6.3 7.2   RBC 4.96 4.93   HGB 14.9 14.7   HCT 44.6 44.2    208     Basic Metabolic Panel    Recent Labs   Lab 09/27/21  0401 09/27/21  0145 09/26/21  2114 09/26/21  1649 09/26/21  0743 09/26/21  0444 09/25/21  1716 09/25/21 0558 09/24/21 1938 09/24/21 1938   NA  --   --   --   --   --   --   --  135  --  135   POTASSIUM 3.8  --   --   --   --  4.0  --  4.0   < > 4.3   CHLORIDE  --   --   --   --   --   --   --  102  --  102   CO2  --   --   --   --   --   --   --  25  --  26   BUN  --   --   --   --   --   --   --  20  --  23   CR  --   --   --   --   --   --   --  1.23  --  1.43*   GLC  --  124* 154* 189*   < >  --    < > 95   < > 101*   SUZANNE  --   --   --   --   --   --   --  9.4  --  9.2    < > = values in this interval not displayed.     Liver Panel  No results for input(s): PROTTOTAL, ALBUMIN, BILITOTAL, ALKPHOS, AST, ALT, BILIDIRECT in the last 168 hours.  INR  No lab results found.   Lipid Profile    Recent Labs   Lab Test 09/25/21 0558 02/19/20  0455   CHOL 145 116   HDL 47 38*   LDL 73 46   TRIG 124 160*     A1C    Recent Labs   Lab Test 02/17/20 2025   A1C 11.5*     Troponin I    Recent Labs   Lab 09/25/21 0558   TROPONIN <0.015             details No chest wall deformities/Symmetric breath sounds clear to auscultation and percussion/Normal respiratory pattern

## 2021-09-27 NOTE — TELEPHONE ENCOUNTER
Dr. Darby doesn't have new appointments until December now. Has he specified he would see this patient within the short timeframe outlined?    Routing to APPs.    Vikki Clemons BS, RN, SCRN  RN Stroke Neurology Care Coordinator  [facility] Neuroscience Service Line

## 2021-09-27 NOTE — PLAN OF CARE
"/66 (BP Location: Left arm)   Pulse 61   Temp 98.2  F (36.8  C) (Oral)   Resp 16   Ht 1.753 m (5' 9\")   Wt 89.6 kg (197 lb 8 oz)   SpO2 93%   BMI 29.17 kg/m      Nursing shift note  Summary: Patient in with new stroke on top of hx of stroke  Alertness/orientation: Alert, pleasant, oriented  Neuro: Left sided weakness. Numbness and tingling.  Cardiac: WDL  Resp: WDL  GI: WDL  : WDL  CMS: Intact  Mobility: A1 GBW  Pain: None  Diet: NPO for procedure  Skin: WDL  Plan: Discharge  Other Important Info: Adequate for discharge      "

## 2021-09-27 NOTE — PROGRESS NOTES
[facility]    Medicine Progress Note - Hospitalist Service       Date of Admission:  9/24/2021    Assessment & Plan         Anson Moise is a 65 year old male who presents with L sided weakness     CVA  Hx CVA 2/2020 and afib on eliquis. Presented with L sided weakness and numbness, gait instability x 1 week. Symptoms have been improved but still present.  MRI outpatient through primary demonstrated acute/subacute R occipital, frontal and parietal lobe infarcts. MRA head with focal short segment occlusion of the R ICA with distal reconstitution.  * HgbA1c is 6.2  * Echocardiogram:  A cardiac source of embolus was not identified.  There is no atrial shunt seen.  Sinus rhythm was noted.  Left ventricular systolic function is normal.  The visual ejection fraction is 60-65%.  The left ventricle is normal in size.  Doppler interrogation does not demonstrate signficant stenosis or insufficiency involving cardiac valves.  No significant change since last study 2/18/2020. A buble study was not  performed.  - Monitor on telemetry   - Neuro checks q4h   - Permissive hypertension with goal <180.  Blood pressure have been well controlled without intervention other than his Lopressor   - Holding PTA apixaban for now.  Will anticoagulate with heparin drip.  Resume Eliquis likely at discharge   - Lipids, continue home rosuvastatin   - PT/OT/SLP are okay with home   - Neurology following and appreciate their recommendations.  Plan for angiogram this afternoon      Severe spinal canal narrowing C4-5, C5-6  Severe R neural foraminal narrowing at C4-5  Noted on admitting MRI (done through [facility]).   - Neurosurgery follow up outpatient as needed    H/o A fib with RVR  HTN  HLD  [apixaban 5 mg BID, hydrochlorothiazide 25 mg daily, lisinopril 20 mg daily, metoprolol 25 mg BID, rosuvastatin 5 mg daily]  - Holding antihypertensives as above   - Continue metoprolol 25 mg BID  - Holding apixaban as above  -  Rosuvastatin      SANTHOSH. Improved   Baseline creatinine ~1.1-1.2. On presentation at 1.43.  Down to 1.23 on recheck   - Avoid nephrotoxins as able   - Monitor   - Stopped IVF      DM II  [glipizide XL 5 mg daily, metformin 500 mg BID]  Hgb A1C on day of admission 6.2%.   - Holding orals  - BID and HS blood sugars.  No indication for SSI at this time      GERD  [pantoprazole 40 mg BID]  - PPI           Diet: NPO per Anesthesia Guidelines for Procedure/Surgery Except for: Meds    DVT Prophylaxis: Heparin SQ  Chilel Catheter: Not present  Central Lines: None  Code Status: No CPR- Do NOT Intubate      Disposition Plan   Expected discharge: 09/28/2021   recommended to prior living arrangement once stroke evaluation complete.     The patient's care was discussed with the Bedside Nurse, Care Coordinator/ and Patient.    Mk Pierre DO  Hospitalist Service  [facility]  Securely message with the Vocera Web Console (learn more here)  Text page via [employer] Paging/Directory      Clinically Significant Risk Factors Present on Admission               ______________________________________________________________________    Interval History   Patient seen and examined.  No acute events overnight.  No pain or trouble breathing.  No new neurologic symptoms.  Currently NPO for angiogram.      Data reviewed today: I reviewed all medications, new labs and imaging results over the last 24 hours. I personally reviewed no images or EKG's today.    Physical Exam   Vital Signs: Temp: 98.2  F (36.8  C) Temp src: Oral BP: 128/66 Pulse: 61   Resp: 16 SpO2: 93 % O2 Device: None (Room air)    Weight: 197 lbs 8 oz  General Appearance: Resting comfortably.  Sitting up in bed  Respiratory: Clear to auscultation.  No respiratory distress  Cardiovascular: RRR.  No obvious murmurs  GI: Soft.  Non-distended  Skin: No obvious rashes or cyanosis  Other: Alert.  Moving all extremities grossly      Data   Recent  Labs   Lab 09/27/21  0401 09/27/21  0145 09/26/21  2114 09/26/21  1649 09/26/21  0743 09/26/21  0444 09/25/21  1716 09/25/21  0558 09/24/21 1938 09/24/21 1938   WBC  --   --   --   --   --   --   --  6.3  --  7.2   HGB  --   --   --   --   --   --   --  14.9  --  14.7   MCV  --   --   --   --   --   --   --  90  --  90   PLT  --   --   --   --   --   --   --  207  --  208   NA  --   --   --   --   --   --   --  135  --  135   POTASSIUM 3.8  --   --   --   --  4.0  --  4.0   < > 4.3   CHLORIDE  --   --   --   --   --   --   --  102  --  102   CO2  --   --   --   --   --   --   --  25  --  26   BUN  --   --   --   --   --   --   --  20  --  23   CR  --   --   --   --   --   --   --  1.23  --  1.43*   ANIONGAP  --   --   --   --   --   --   --  8  --  7   SUZANNE  --   --   --   --   --   --   --  9.4  --  9.2   GLC  --  124* 154* 189*   < >  --    < > 95   < > 101*   TROPONIN  --   --   --   --   --   --   --  <0.015  --  <0.015    < > = values in this interval not displayed.     No results found for this or any previous visit (from the past 24 hour(s)).

## 2021-09-27 NOTE — PLAN OF CARE
Pt here with R CVA. A&Ox4. Neuros intact except LUE and LLE 4/5, L pronator drift, numbness to LUE. VSS. Tele NSR. Regular diet, thin liquids. Takes pills whole. NPO at midnight for cerebral angiogram today. Up with assist x1, GB and cane. Denies pain. Heparin drip running at 7.5 mL/hr, recheck at 1200. Pt scoring green on the Aggression Stop Light Tool. Plan complete cerebral angiogram. Discharge pending.

## 2021-09-27 NOTE — DISCHARGE SUMMARY
[facility]  Hospitalist Discharge Summary      Date of Admission:  9/24/2021  Date of Discharge:  9/27/2021  Discharging Provider: Mk Pierre,       Discharge Diagnoses   CVA  Severe spinal canal narrowing C4-5, C5-6  Severe R neural foraminal narrowing at C4-5  H/o A fib with RVR  HTN  HLD  SANTHOSH  DM II  GERD      Follow-ups Needed After Discharge   Follow-up Appointments     Follow-up and recommended labs and tests       Follow up with primary care provider, Francisco Syed, within 1-2   weeks, for hospital follow- up and regarding new diagnosis. No follow up   labs or test are needed.  Should monitor blood pressures and adjust   regimen further as needed.  Patient's blood pressures were well controlled   here so stopped some of his blood pressure medications    Follow up with neurology and cardiology as recommended            Unresulted Labs Ordered in the Past 30 Days of this Admission     No orders found from 8/25/2021 to 9/25/2021.        Discharge Disposition   Discharged to home  Condition at discharge: Stable    Hospital Course   CVA  Hx CVA 2/2020 and afib on eliquis. Presented with L sided weakness and numbness, gait instability x 1 week. Symptoms have been improved but still present.  MRI outpatient through primary demonstrated acute/subacute R occipital, frontal and parietal lobe infarcts. MRA head with focal short segment occlusion of the R ICA with distal reconstitution.  * HgbA1c is 6.2  * Echocardiogram:  A cardiac source of embolus was not identified.  There is no atrial shunt seen.  Sinus rhythm was noted.  Left ventricular systolic function is normal.  The visual ejection fraction is 60-65%.  The left ventricle is normal in size.  Doppler interrogation does not demonstrate signficant stenosis or insufficiency involving cardiac valves.  No significant change since last study 2/18/2020. A buble study was not  performed.  - Monitor on telemetry   - Neuro checks  q4h   - Permissive hypertension with goal <180.  Blood pressure have been well controlled without intervention other than his Lopressor   - Holding PTA apixaban for now.  Will anticoagulate with heparin drip.  Resume Eliquis likely at discharge   - Lipids, continue home rosuvastatin   - PT/OT/SLP are okay with home   - Neurology following and appreciate their recommendations.  Plan was for angiogram but when the angiogram team reviewed images they felt likely no intervention would be warranted so it was discontinued.  Now patient will follow up closely with neurology      Severe spinal canal narrowing C4-5, C5-6  Severe R neural foraminal narrowing at C4-5  Noted on admitting MRI (done through [facility]).   - Neurosurgery follow up outpatient as needed     H/o A fib with RVR  HTN  HLD  [apixaban 5 mg BID, hydrochlorothiazide 25 mg daily, lisinopril 20 mg daily, metoprolol 25 mg BID, rosuvastatin 5 mg daily]  - Holding antihypertensives as above   - Continue metoprolol 25 mg BID  - Holding apixaban as above  - Rosuvastatin      SANTHOSH. Improved   Baseline creatinine ~1.1-1.2. On presentation at 1.43.  Down to 1.23 on recheck   - Avoid nephrotoxins as able   - Monitor   - Stopped IVF      DM II  [glipizide XL 5 mg daily, metformin 500 mg BID]  Hgb A1C on day of admission 6.2%.   - Holding orals  - BID and HS blood sugars.  No indication for SSI at this time      GERD    Consultations This Hospital Stay   PATIENT LEARNING CENTER IP CONSULT  NEUROLOGY IP CONSULT  SPEECH LANGUAGE PATH ADULT IP CONSULT  PHARMACY IP CONSULT  PHARMACY IP CONSULT  PHARMACY IP CONSULT  PHYSICAL THERAPY ADULT IP CONSULT  OCCUPATIONAL THERAPY ADULT IP CONSULT  REHAB ADMISSIONS LIAISON IP CONSULT  CARE MANAGEMENT / SOCIAL WORK IP CONSULT  NEUROLOGY INTERVENTIONAL ADULT IP CONSULT  PHARMACY IP CONSULT  PHARMACY IP CONSULT  PHARMACY IP CONSULT  PHARMACY IP CONSULT    Code Status   No CPR- Do NOT Intubate    Time Spent on this Encounter   Mk DOZIER  DO Ignacio, personally saw the patient today and spent greater than 30 minutes discharging this patient.       Mk Pierre DO  [address] [facility]  [address] [place] [address]  Phone: [phone]  ______________________________________________________________________    Physical Exam   Vital Signs: Temp: 97.9  F (36.6  C) Temp src: Oral BP: (!) 146/80 Pulse: 62   Resp: 16 SpO2: 94 % O2 Device: None (Room air)    Weight: 197 lbs 8 oz  General Appearance: Resting comfortably. NAD   Respiratory: Clear to auscultation.  No respiratory distress  Cardiovascular: RRR.  No obvious murmurs  GI: Soft.  Non-distended  Skin: No obvious rashes or cyanosis  Other: No edema.  No calf tenderness         Primary Care Physician   [facility]    Discharge Orders      Physical Therapy Referral      Occupational Therapy Referral      Occupational Therapy Referral      Physical Therapy Referral      Reason for your hospital stay    Stroke     Follow-up and recommended labs and tests     Follow up with primary care provider, Francisco Syed, within 1-2 weeks, for hospital follow- up and regarding new diagnosis. No follow up labs or test are needed.  Should monitor blood pressures and adjust regimen further as needed.  Patient's blood pressures were well controlled here so stopped some of his blood pressure medications    Follow up with neurology and cardiology as recommended     Activity    Your activity upon discharge: activity as tolerated     Discharge Instructions    Monitor blood pressures 3 times a week and record.  Give these numbers to your PCP     Diet    Follow this diet upon discharge: Orders Placed This Encounter      Consistent Carbohydrate Diet Consistent Carb 45 grams CHO per Meal Diet       Significant Results and Procedures   Most Recent 3 CBC's:Recent Labs   Lab Test 09/25/21  0558 09/24/21  1938 02/20/20  0813 02/19/20  0455 02/19/20  0455   WBC 6.3 7.2  --    --  7.0   HGB 14.9 14.7 12.9*   < > 12.5*   MCV 90 90  --   --  85    208 154   < > 144*    < > = values in this interval not displayed.     Most Recent 3 BMP's:Recent Labs   Lab Test 09/27/21  0401 09/27/21  0145 09/26/21  2114 09/26/21  1649 09/26/21  0743 09/26/21  0444 09/25/21  1716 09/25/21  0558 09/24/21  1938 09/24/21 1938 02/24/20  0842 02/21/20  0844 02/20/20  0813   NA  --   --   --   --   --   --   --  135  --  135  --   --  141   POTASSIUM 3.8  --   --   --   --  4.0  --  4.0   < > 4.3  --   --  3.9   CHLORIDE  --   --   --   --   --   --   --  102  --  102  --   --  109   CO2  --   --   --   --   --   --   --  25  --  26  --   --  28   BUN  --   --   --   --   --   --   --  20  --  23  --   --  7   CR  --   --   --   --   --   --   --  1.23  --  1.43* 0.71   < > 0.55*   ANIONGAP  --   --   --   --   --   --   --  8  --  7  --   --  4   SUZANNE  --   --   --   --   --   --   --  9.4  --  9.2  --   --  9.4   GLC  --  124* 154* 189*   < >  --    < > 95   < > 101*  --    < > 190*    < > = values in this interval not displayed.   ,   Results for orders placed or performed during the hospital encounter of 09/24/21   CTA Head Neck with Contrast    Narrative    EXAM: CTA  HEAD NECK WITH CONTRAST  LOCATION: [facility]  DATE/TIME: 9/24/2021 9:26 PM    INDICATION: Stroke/TIA, assess intracranial arteries Stroke/TIA, assess extracranial arteries  COMPARISON: CTA head and neck 2/18/2020, MRI brain 2/18/2020  CONTRAST: 70 mL [id number]  TECHNIQUE: Head and neck CT angiogram with IV contrast. Axial helical CT images of the head and neck vessels obtained during the arterial phase of intravenous contrast administration. Axial 2D reconstructed images and multiplanar 3D MIP reconstructed   images of the head and neck vessels were performed by the technologist. Dose reduction techniques were used. All stenosis measurements made according to NASCET criteria unless otherwise  specified.    FINDINGS:     HEAD CTA:  ANTERIOR CIRCULATION: No branch occlusion. The previously seen aneurysm projecting off the left ICA is not visualized on this exam, and there is a prominent infundibulum at the communicating segment of the left ICA. There is moderate stenosis at the   proximal cavernous left ICA, slightly progressed in the interval. No AVM. Fetal origin of the right posterior cerebral artery from the anterior circulation.    POSTERIOR CIRCULATION: No stenosis/occlusion, aneurysm, or high flow vascular malformation. Moderate to severe multifocal stenosis of the left V4 segment. Normal basilar artery.     DURAL VENOUS SINUSES: Expected enhancement of the major dural venous sinuses.    NECK CTA:  RIGHT CAROTID: No measurable stenosis or dissection.    LEFT CAROTID: Mild to moderate eccentric narrowing of the distal left ICA, just below the skull base.    VERTEBRAL ARTERIES: The left vertebral artery is occluded at its origin. There is some attenuated reconstitution at the V2 segment with a patent V3 segment. There is high-grade stenosis at the origin of this vessel on 2/18/2020. Slightly dominant right   vertebral artery is patent throughout its course.    AORTIC ARCH: Classic aortic arch anatomy with no significant stenosis at the origin of the great vessels.    NONVASCULAR STRUCTURES: Unremarkable.      Impression    IMPRESSION:     HEAD CTA:   1.  No branch occlusion or aneurysm. 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  2.  Moderate focal stenosis at the proximal cavernous left ICA, progressed from prior.  3.  Multifocal moderate to severe stenosis of the left V4 segment, progressed in the interval.    NECK CTA:  1.  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 There is high-grade stenosis at origin of this vessel on 2/18/2020. Attenuated reconstitution at the mid V2 segment with a patent V3 segment.  2.  Mild to  moderate eccentric narrowing of the distal left ICA just below the skull base.   Echocardiogram Complete - For age > 60 yrs     Value    LVEF  60-65%    Narrative    [id number]  [id number]  [id number]  [id number]^YESSI^ALBERTO^CORAL     [facility]  Echocardiography Laboratory  [address] [place] [address]     Name: DIANA MACKENZIE  MRN: [id number]  : 1955  Study Date: 2021 08:53 AM  Age: 65 yrs  Gender: Male  Patient Location: [facility]  Reason For Study: Cerebrovascular Incident  Ordering Physician: ALBERTO DICKSON  Performed By: Yael Duff     BSA: 2.1 m2  Height: 69 in  Weight: 197 lb  HR: 87  ______________________________________________________________________________  Procedure  Complete Portable Echo Adult.  ______________________________________________________________________________  Interpretation Summary     A cardiac source of embolus was not identified  There is no atrial shunt seen.  Sinus rhythm was noted.  Left ventricular systolic function is normal.  The visual ejection fraction is 60-65%.  The left ventricle is normal in size.  Doppler interrogation does not demonstrate signficant stenosis or  insufficiency involving cardiac valves.     No significant change since last study 2020. A buble study was not  performed.  ______________________________________________________________________________  Left Ventricle  The left ventricle is normal in size. There is normal left ventricular wall  thickness. Left ventricular systolic function is normal. The visual ejection  fraction is 60-65%. Grade I or early diastolic dysfunction. No regional wall  motion abnormalities noted. There is no thrombus seen in the left ventricle.     Right Ventricle  The right ventricle is normal in structure, function and size. There is no  mass or thrombus in the right ventricle.     Atria  Normal left atrial size. Right atrial size is normal. There is no atrial  shunt  seen. The left atrial appendage is not well visualized.     Mitral Valve  The mitral valve leaflets appear normal. There is no evidence of stenosis,  fluttering, or prolapse. There is no mitral regurgitation noted. There is no  mitral valve stenosis.     Tricuspid Valve  Normal tricuspid valve. No tricuspid regurgitation. Right ventricular systolic  pressure could not be approximated due to inadequate tricuspid regurgitation.  There is no tricuspid stenosis.     Aortic Valve  The aortic valve is trileaflet. No aortic regurgitation is present. No aortic  stenosis is present.     Pulmonic Valve  Normal pulmonic valve. There is no pulmonic valvular regurgitation. There is  no pulmonic valvular stenosis.     Vessels  The aortic root is normal size. Normal size ascending aorta. The inferior vena  cava is normal. The pulmonary artery is normal size.     Pericardium  The pericardium appears normal. There is no pleural effusion.     Rhythm  Sinus rhythm was noted.  ______________________________________________________________________________  MMode/2D Measurements & Calculations  IVSd: 1.0 cm  LVIDd: 4.1 cm  LVIDs: 2.6 cm  LVPWd: 1.1 cm  FS: 36.9 %  LV mass(C)d: 143.6 grams  LV mass(C)dI: 69.9 grams/m2  Ao root diam: 3.7 cm  LA dimension: 3.5 cm  asc Aorta Diam: 3.3 cm  LA/Ao: 0.93  LA Volume (BP): 39.3 ml  LA Volume Index (BP): 19.2 ml/m2     RWT: 0.52     Doppler Measurements & Calculations  MV E max teofilo: 56.3 cm/sec  MV A max teofilo: 80.0 cm/sec  MV E/A: 0.70  MV dec slope: 185.8 cm/sec2  MV dec time: 0.30 sec  PA acc time: 0.12 sec  E/E' av.7  Lateral E/e': 6.2  Medial E/e': 9.1     ______________________________________________________________________________  Report approved by: Dr. Vladimir Javed 2021 10:59 AM               Discharge Medications   Current Discharge Medication List      START taking these medications    Details   aspirin (ASA) 81 MG EC tablet Take 1 tablet (81 mg) by mouth  daily  Qty: 30 tablet, Refills: 0    Comments: Future refills by PCP Dr. Francisco Syed with phone number [phone].  Associated Diagnoses: Acute ischemic stroke (H)         CONTINUE these medications which have NOT CHANGED    Details   apixaban ANTICOAGULANT (ELIQUIS) 5 MG tablet Take 1 tablet (5 mg) by mouth 2 times daily  Qty: 30 tablet, Refills: 1    Comments: Future refills by PCP Dr. Francisco Syed with phone number [phone].  Associated Diagnoses: Cerebrovascular accident (CVA), unspecified mechanism (H)      glipiZIDE (GLUCOTROL XL) 5 MG 24 hr tablet Take 1 tablet (5 mg) by mouth every morning (before breakfast)  Qty: 30 tablet, Refills: 0    Comments: Future refills by PCP Dr. Francisco Syed with phone number [phone].  Associated Diagnoses: Type 2 diabetes mellitus with hyperglycemia, without long-term current use of insulin (H)      metFORMIN (GLUCOPHAGE-XR) 500 MG 24 hr tablet Take 2 tablets (1,000 mg) by mouth daily (with dinner)  Qty: 30 tablet, Refills: 0    Associated Diagnoses: Type 2 diabetes mellitus with hyperglycemia, without long-term current use of insulin (H)      metoprolol tartrate (LOPRESSOR) 25 MG tablet Take 1 tablet (25 mg) by mouth 2 times daily  Qty: 60 tablet, Refills: 0    Associated Diagnoses: Paroxysmal atrial fibrillation (H)      pantoprazole (PROTONIX) 40 MG EC tablet Take 1 tablet (40 mg) by mouth 2 times daily (before meals)  Qty: 60 tablet, Refills: 0    Comments: Future refills by PCP Dr. Francisco Syed with phone number [phone].  Associated Diagnoses: Gastroesophageal reflux disease without esophagitis      rosuvastatin (CRESTOR) 5 MG tablet Take 5 mg by mouth daily         STOP taking these medications       amLODIPine (NORVASC) 5 MG tablet Comments:   Reason for Stopping:         hydrochlorothiazide (HYDRODIURIL) 25 MG tablet Comments:   Reason for Stopping:         lisinopril (ZESTRIL) 40 MG tablet Comments:   Reason for Stopping:              Allergies   No Known Allergies

## 2021-09-27 NOTE — TELEPHONE ENCOUNTER
----- Message from Vladimir Murillo MD sent at 9/27/2021  3:22 PM CDT -----  Regarding: Hospital Follow up  Stroke Hospital Follow Up    Please schedule the patient for hospital follow up with: - in 2-4 weeks with Dr. Darby for neurocardiology clinic    Diagnosis: Stroke ? Watchman? ICAD - with afib  On elequis and aspirin (planned for 3 months)    Contact: patient him/herself    For any questions, or if this time frame does not work, please write to P STROKE JENNY    Thank you    Vladimir Murillo MD  3:22 PM 09/27/2021

## 2021-09-27 NOTE — PLAN OF CARE
Physical Therapy Discharge Summary    Reason for therapy discharge:    Discharged to home with outpatient therapy.    Progress towards therapy goal(s). See goals on Care Plan in [facility] electronic health record for goal details.  Goals partially met.  Barriers to achieving goals:   discharge from facility.    Therapy recommendation(s):    Continued therapy is recommended.  Rationale/Recommendations:  PT - Patient demonstrates necessary level of functional mobility with use of cane in order to return to home environment. Patient is a high fall risk and recommended to receive services for yard work which patient had previously been doing on his own. Patient is recommended to continue with skilled OP-PT in order to improve LE strength, balance and activity tolerance in order to return to baseline mobility.

## 2021-09-28 ENCOUNTER — PATIENT OUTREACH (OUTPATIENT)
Dept: CARE COORDINATION | Facility: CLINIC | Age: 66
End: 2021-09-28

## 2021-09-28 DIAGNOSIS — Z71.89 OTHER SPECIFIED COUNSELING: ICD-10-CM

## 2021-09-28 NOTE — PROGRESS NOTES
Clinic Care Coordination Contact  [facility]: Post-Discharge Note  SITUATION                                                      Admission:    Admission Date: 09/24/21   Reason for Admission: L sided weakness  Discharge:   Discharge Date: 09/27/21  Discharge Diagnosis: L sided weakness    BACKGROUND                                                      Anson Moise is a 65 year old male who presents with left-sided weakness and incoordination.  Patient notes that proximally 1 week ago he started noticing left arm numbness.  He also notes that he had difficulty with repetitive actions in his hand.  He is also noticed the last couple days he has had poor balance.  He also notes decreased energy.  He has decreased  strength on the left.  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  He feels like his left leg is stiff and not.  Denies any vision changes.  Denies dysphagia.  He has been taking his medications.  Denies chest pain or shortness of breath.  No nausea or vomiting.  No abdominal pain.    ASSESSMENT           Discharge Assessment  How are your symptoms? (Red Flag symptoms escalate to triage hotline per guidelines): Improved  Do you feel your condition is stable enough to be safe at home until your provider visit?: Yes  Does the patient have their discharge instructions? : Yes  Does the patient have questions regarding their discharge instructions? : No  Were you started on any new medications or were there changes to any of your previous medications? : Yes  Does the patient have all of their medications?: Yes  Do you have questions regarding any of your medications? : No  Do you have all of your needed medical supplies or equipment (DME)?  (i.e. oxygen tank, CPAP, cane, etc.): Yes  Discharge follow-up appointment scheduled within 14 calendar days? : No  Is patient agreeable to assistance with scheduling? : No                  PLAN                                                       Outpatient Plan:  Follow up with primary care provider, Francisco Syed, within 1-2 weeks, for hospital follow- up and regarding new  diagnosis. No follow up labs or test are needed. Should monitor blood pressures and adjust regimen further as needed.  Patient's blood pressures were well controlled here so stopped some of his blood pressure medications    No future appointments.      For any urgent concerns, please contact our 24 hour nurse triage line: [phone] ([phone])         Molly Mccarty  Community Health Worker  [facility]  [phone]

## 2021-09-28 NOTE — PLAN OF CARE
Speech Language Therapy Discharge Summary    Reason for therapy discharge:    Discharged to home.    Progress towards therapy goal(s). See goals on Care Plan in [facility] electronic health record for goal details.  Goals partially met.  Barriers to achieving goals:   discharge from facility.    Therapy recommendation(s):    Pt discharged on a regular/thin diet. Recommend OP SLP follow up for communication or swallow if concerns arise

## 2021-09-28 NOTE — PLAN OF CARE
Occupational Therapy Discharge Summary    Reason for therapy discharge:    Discharged to home with outpatient therapy.    Progress towards therapy goal(s). See goals on Care Plan in [facility] electronic health record for goal details.  Goals partially met.  Barriers to achieving goals:   discharge from facility.    Therapy recommendation(s):    Continued therapy is recommended.  Rationale/Recommendations:  Rec spv initially w/ bathing and A for IADL's such as yardwork, heavier cleaning tasks, med mgmt, and transportation. Rec no driving until after cleared by MD and OP OT 2' LUE, visual-motor, and cognitive impairments..

## 2021-09-30 NOTE — TELEPHONE ENCOUNTER
Per Dr. Darby and Dr. Epstein, pt is planned to be reviewed during neuroradiology conference this week. Once reviewed they will determine definitive plan and Stroke RNCC will assist with coordination of follow-up appts.    Vikki Clemons BS, RN, SCRN  RN Stroke Neurology Care Coordinator  [facility] Neuroscience Service Line

## 2021-10-01 NOTE — TELEPHONE ENCOUNTER
Spoke to pt and scheduled follow-up for 10/21 at 3:00pm. Per pt request, I sent him an email with the appointment information so he can add it to his calendar. I explained to him that he will be contacted 10-15 minutes prior to his appointment to get checked in and ensure he's all set up.    Vikki REDDY, RN, SCRN  RN Stroke Neurology Care Coordinator  [facility] Neuroscience Service Line

## 2021-10-01 NOTE — TELEPHONE ENCOUNTER
After provider review, recommended that pt follow-up with stroke APPs rather than Dr. Darby.    Will contact pt to schedule.    Vikki REDDY, RN, SCRN  RN Stroke Neurology Care Coordinator  [facility] Neuroscience Service Line

## 2021-10-19 ENCOUNTER — TELEPHONE (OUTPATIENT)
Dept: NEUROLOGY | Facility: CLINIC | Age: 66
End: 2021-10-19

## 2021-10-19 NOTE — TELEPHONE ENCOUNTER
Spoke with patient's PCP. She recently saw patient and he was a poor historian when it came to his stroke follow-up. Per Dr. Rico, the patients neurosurgeon is recommending surgical intervention for pt's severe cervical stenosis. They are looking for stroke input regarding his Eliquis pre and post op. She would like to speak directly with our providers prior to the pt's JENNY appt on Thursday.     Valerie, can you please reach out to her either this afternoon or at some point tomorrow? She provided me her cell phone number [phone]    Rosa, can you please call the patient and remind him of appt on Thursday and let him know that our provider is connected with his PCP regarding his care as well?    Vikki Clemons BS, RN, SCRN  RN Stroke Neurology Care Coordinator  [facility] Neuroscience Service Line

## 2021-10-19 NOTE — TELEPHONE ENCOUNTER
Physician Dr. Rico - PCP for patient called wanting to coordinate further neurology testing for patient.  Please call physician at the following number:    Office [phone]  Cell:

## 2021-10-20 ENCOUNTER — TELEPHONE (OUTPATIENT)
Dept: NEUROLOGY | Facility: CLINIC | Age: 66
End: 2021-10-20

## 2021-10-20 NOTE — TELEPHONE ENCOUNTER
Our clinic was contacted by patient's PCP (Dr. Rico) regarding post-stroke management and possible upcoming surgery. I spoke with her today to discuss our recommendations. Patient's neurosurgery team is recommending surgical intervention on his cervical spinal stenosis. It is unclear what timeline they are planning for. In general, from a stroke perspective, evidence delineates a significant reduction in stroke risk 9 months post-stroke. It is recommended that elective surgeries occur after this time, if possible. If the surgery is more urgent, it is recommended that it occur at least 3 months post-stroke. Given that he is on both anticoagulation and aspirin, from a stroke perspective we recommend bridging with Lovenox in the charly-operative period.

## 2021-10-21 ENCOUNTER — VIRTUAL VISIT (OUTPATIENT)
Dept: NEUROLOGY | Facility: CLINIC | Age: 66
End: 2021-10-21
Attending: NURSE PRACTITIONER
Payer: COMMERCIAL

## 2021-10-21 DIAGNOSIS — I63.9 ACUTE ISCHEMIC STROKE (H): Primary | ICD-10-CM

## 2021-10-21 PROCEDURE — 99205 OFFICE O/P NEW HI 60 MIN: CPT | Mod: 95

## 2021-10-21 NOTE — LETTER
10/21/2021         RE: Anson Moise  [address]  [facility] [place] [address]        Dear Colleague,    Thank you for referring your patient, Anson Moise, to the [facility] NEUROLOGY [facility]. Please see a copy of my visit note below.    Anson is a 65 year old who is being evaluated via a billable video visit.      How would you like to obtain your AVS? MyChart  If the video visit is dropped, the invitation should be resent by: Text to cell phone: [phone]  Will anyone else be joining your video visit? No      Video Start Time: 1505  Video-Visit Details    Type of service:  Video Visit    Video End Time:1534    Originating Location (pt. Location): Home    Distant Location (provider location):  [facility] NEUROLOGY [facility]     Platform used for Video Visit: RapidMind      __________________________________________________________      [facility]    Virtual Clinic - [phone]  __________________________________________________________    Chief Complaint: Patient presents with:  Stroke: Follow up       History of Present Illness:   Anson Moise is a 65 year old male presenting for follow-up for right hemisphere stroke. He initially presented to his PCP 9/24 for evaluation of left arm numbness/weakness and gait imbalance that began 10 days prior. Outpatient imaging revealed right hemisphere infarcts and distal R ICA severe stenosis vs occlusion. He was directed to present to the [facility] ED for further evaluation. Prior to the hospital stay, he had a past medical history of atrial fibrillation (on Eliquis), prior stroke, DM2, and HTN. Inpatient evaluation revealed progression of ICAD since prior imaging completed in 2020.      Stroke Evaluation Summarized  MRI/Head CT MRI at outside hospital revealed right cerebral hemisphere infarcts involving the frontal and parietal lobes   Intracranial Vasculature CTA head with moderate focal stenosis at the proximal  "cavernous left ICA, severe multifocal stenosis of left V4. MRA head from OSH demonstrated short segment right ICA occlusion in petrous segment. 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 There is high-grade stenosis at origin of this vessel on 2/18/2020. Attenuated reconstitution at the mid V2 segment with a patent V3 segment.      Echocardiogram EF 60-65%, normal left and right atrial size, no atrial shunt reported   EKG/Telemetry SR    Other Testing Not Applicable     LDL  9/25/2021: 73 mg/dL   A1C  6.2   Troponin 9/25/2021: <0.015 ug/L     He was discharged on Eliquis + ASA 81 mg daily for recurrent stroke prevention. Since being discharged, he reports continued difficulty with fine motor movements of his left hand. He also describes numbness in bilateral legs that has been present \"for some time\". Of note, an MR Pineda completed outpatient shows severe narrowing at C4-5 and he is being considered for neurosurgical intervention. I was contacted by his PCP to discuss surgical risk stratification and this is documented in a prior telephone note.     Modified Nathaniel Scale  Score: 1-No significant disability despite symptoms; able to carry out all usual duties and activities    Impression:   Problem List Items Addressed This Visit        Nervous and Auditory    Acute ischemic stroke (H) - Primary        65 M with vascular risk factors (HTN, HLD, DM2) and atrial fibrillation (on AC) found to have new right hemisphere strokes in the setting of severe R ICA stenosis.     Plan:   - Continue Eliquis 5 mg BID + ASA 81 mg daily. Recommend continuing AC + AP for 6 months.   - Continue Crestor 10 mg daily with goal LDL 40-70  - Reports that BP has been 130s/80s without worsening of his symptoms. Goal normotension  - Sleep study recommended while inpatient. He would like to have this completed within [facility] and will contact his PCP about a referral.   - " "Discussed that his left arm weakness is likely related to his stroke given the recent onset and associated MRI findings. Given the constraints of telemedicine, it is difficult to do a more thorough neurologic exam and therefore difficult to assess the contributions from stroke vs spinal stenosis in his weakness. An EMG nerve conduction study could be considered  - Recommendation is to wait 9 months post-stroke for elective surgeries to minimize charly-operative stroke risk. He is unsure of the urgency or proposed timing of the surgery. I recommend that he contact the stroke clinic for more detailed surgical planning when he has additional information regarding the intervention.       - Follow up in 3 months      Stroke Education provided.  He will call us with any questions.  For any acute neurologic deficits he was advised to  go directly to the hospital rather than call the clinic.    Valerie Deleon, CNP  Neurology  10/22/2021 1:56 PM  To page me or covering stroke neurology team member, click here: AMCOM  Choose \"On Call\" tab at top, then search dropdown box for \"Neurology Adult\" & press Enter, look for Neuro ICU/Stroke    ___________________________________________________________________    Current Medications  Current Outpatient Medications   Medication Sig     apixaban ANTICOAGULANT (ELIQUIS) 5 MG tablet Take 1 tablet (5 mg) by mouth 2 times daily     aspirin (ASA) 81 MG EC tablet Take 1 tablet (81 mg) by mouth daily     glipiZIDE (GLUCOTROL XL) 5 MG 24 hr tablet Take 1 tablet (5 mg) by mouth every morning (before breakfast)     metFORMIN (GLUCOPHAGE-XR) 500 MG 24 hr tablet Take 2 tablets (1,000 mg) by mouth daily (with dinner)     metoprolol tartrate (LOPRESSOR) 25 MG tablet Take 1 tablet (25 mg) by mouth 2 times daily     pantoprazole (PROTONIX) 40 MG EC tablet Take 1 tablet (40 mg) by mouth 2 times daily (before meals)     rosuvastatin (CRESTOR) 5 MG tablet Take 5 mg by mouth daily     No current " facility-administered medications for this visit.       Past Medical History  Past Medical History:   Diagnosis Date     Cerebral infarction (H)      Diabetes (H)      Hypertension        Social History  Social History     Tobacco Use     Smoking status: Current Every Day Smoker     Packs/day: 0.25     Types: Cigarettes     Smokeless tobacco: Never Used     Tobacco comment: trying to quit   Substance Use Topics     Alcohol use: Yes     Comment: few drinks a night     Drug use: Never       Family History  No family history on file.    Physical Exam    Vitals - Patient Reported 10/21/2021   Weight (Patient Reported) 197 lb               General:  no acute distress  HEENT:  normocephalic/atraumatic  Pulmonary:  no respiratory distress    Neurologic  Mental Status:  alert, oriented x 3, follows commands, speech clear and fluent, naming and repetition normal  Cranial Nerves:  EOMI with normal smooth pursuit, facial movements symmetric, hearing not formally tested but intact to conversation, no dysarthria  Motor:  no abnormal movements, able to move all limbs antigravity spontaneously with no signs of hemiparesis observed  Reflexes:  unable to test (telestroke)  Sensory:  unable to test (telestroke)  Coordination: no obvious ataxia, decreased Gogo in left hand compared to right  Station/Gait:  unable to test (telestroke)    Neuroimaging: as per HPI. I personally reviewed those images    Labs:    Coagulation studies:  No lab results found.     Lipid panel:  Recent Labs   Lab Test 09/25/21  0558 02/19/20  0455   CHOL 145 116   HDL 47 38*   LDL 73 46   TRIG 124 160*       HbA1C:  Recent Labs   Lab Test 02/17/20 2025   A1C 11.5*       Troponin:  No lab results found.      Billing:    I spent a total of 60 minutes on the day of the visit.   Time spent doing chart review, history and exam, documentation and further activities per the note          Again, thank you for allowing me to participate in the care of your patient.         Sincerely,         Neurology Stroke JENNY

## 2021-10-21 NOTE — PROGRESS NOTES
Anson is a 65 year old who is being evaluated via a billable video visit.      How would you like to obtain your AVS? MyChart  If the video visit is dropped, the invitation should be resent by: Text to cell phone: [phone]  Will anyone else be joining your video visit? No      Video Start Time: 1505  Video-Visit Details    Type of service:  Video Visit    Video End Time:1534    Originating Location (pt. Location): Home    Distant Location (provider location):  [facility] NEUROLOGY CL[facility]IC [place]     Platform used for Video Visit: Socitive      __________________________________________________________      [facility]    Virtual Clinic - [phone]  __________________________________________________________    Chief Complaint: Patient presents with:  Stroke: Follow up       History of Present Illness:   Anson Moise is a 65 year old male presenting for follow-up for right hemisphere stroke. He initially presented to his PCP 9/24 for evaluation of left arm numbness/weakness and gait imbalance that began 10 days prior. Outpatient imaging revealed right hemisphere infarcts and distal R ICA severe stenosis vs occlusion. He was directed to present to the [facility] ED for further evaluation. Prior to the hospital stay, he had a past medical history of atrial fibrillation (on Eliquis), prior stroke, DM2, and HTN. Inpatient evaluation revealed progression of ICAD since prior imaging completed in 2020.      Stroke Evaluation Summarized  MRI/Head CT MRI at outside hospital revealed right cerebral hemisphere infarcts involving the frontal and parietal lobes   Intracranial Vasculature CTA head with moderate focal stenosis at the proximal cavernous left ICA, severe multifocal stenosis of left V4. MRA head from [facility] demonstrated short segment right ICA occlusion in petrous segment. 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  "There is high-grade stenosis at origin of this vessel on 2/18/2020. Attenuated reconstitution at the mid V2 segment with a patent V3 segment.      Echocardiogram EF 60-65%, normal left and right atrial size, no atrial shunt reported   EKG/Telemetry SR    Other Testing Not Applicable     LDL  9/25/2021: 73 mg/dL   A1C  6.2   Troponin 9/25/2021: <0.015 ug/L     He was discharged on Eliquis + ASA 81 mg daily for recurrent stroke prevention. Since being discharged, he reports continued difficulty with fine motor movements of his left hand. He also describes numbness in bilateral legs that has been present \"for some time\". Of note, an MR Miriam completed outpatient shows severe narrowing at C4-5 and he is being considered for neurosurgical intervention. I was contacted by his PCP to discuss surgical risk stratification and this is documented in a prior telephone note.     Modified Gill Scale  Score: 1-No significant disability despite symptoms; able to carry out all usual duties and activities    Impression:   Problem List Items Addressed This Visit        Nervous and Auditory    Acute ischemic stroke (H) - Primary        65 M with vascular risk factors (HTN, HLD, DM2) and atrial fibrillation (on AC) found to have new right hemisphere strokes in the setting of severe R ICA stenosis.     Plan:   - Continue Eliquis 5 mg BID + ASA 81 mg daily. Recommend continuing AC + AP for 6 months.   - Continue Crestor 10 mg daily with goal LDL 40-70  - Reports that BP has been 130s/80s without worsening of his symptoms. Goal normotension  - Sleep study recommended while inpatient. He would like to have this completed within [facility] and will contact his PCP about a referral.   - Discussed that his left arm weakness is likely related to his stroke given the recent onset and associated MRI findings. Given the constraints of telemedicine, it is difficult to do a more thorough neurologic exam and therefore difficult to assess the " "contributions from stroke vs spinal stenosis in his weakness. An EMG nerve conduction study could be considered  - Recommendation is to wait 9 months post-stroke for elective surgeries to minimize charly-operative stroke risk. He is unsure of the urgency or proposed timing of the surgery. I recommend that he contact the stroke clinic for more detailed surgical planning when he has additional information regarding the intervention.       - Follow up in 3 months      Stroke Education provided.  He will call us with any questions.  For any acute neurologic deficits he was advised to  go directly to the hospital rather than call the clinic.    Valerie Deleon, CNP  Neurology  10/22/2021 1:56 PM  To page me or covering stroke neurology team member, click here: AMCOM  Choose \"On Call\" tab at top, then search dropdown box for \"Neurology Adult\" & press Enter, look for Neuro ICU/Stroke    ___________________________________________________________________    Current Medications  Current Outpatient Medications   Medication Sig     apixaban ANTICOAGULANT (ELIQUIS) 5 MG tablet Take 1 tablet (5 mg) by mouth 2 times daily     aspirin (ASA) 81 MG EC tablet Take 1 tablet (81 mg) by mouth daily     glipiZIDE (GLUCOTROL XL) 5 MG 24 hr tablet Take 1 tablet (5 mg) by mouth every morning (before breakfast)     metFORMIN (GLUCOPHAGE-XR) 500 MG 24 hr tablet Take 2 tablets (1,000 mg) by mouth daily (with dinner)     metoprolol tartrate (LOPRESSOR) 25 MG tablet Take 1 tablet (25 mg) by mouth 2 times daily     pantoprazole (PROTONIX) 40 MG EC tablet Take 1 tablet (40 mg) by mouth 2 times daily (before meals)     rosuvastatin (CRESTOR) 5 MG tablet Take 5 mg by mouth daily     No current facility-administered medications for this visit.       Past Medical History  Past Medical History:   Diagnosis Date     Cerebral infarction (H)      Diabetes (H)      Hypertension        Social History  Social History     Tobacco Use     Smoking status: Current " Every Day Smoker     Packs/day: 0.25     Types: Cigarettes     Smokeless tobacco: Never Used     Tobacco comment: trying to quit   Substance Use Topics     Alcohol use: Yes     Comment: few drinks a night     Drug use: Never       Family History  No family history on file.    Physical Exam    Vitals - Patient Reported 10/21/2021   Weight (Patient Reported) 197 lb               General:  no acute distress  HEENT:  normocephalic/atraumatic  Pulmonary:  no respiratory distress    Neurologic  Mental Status:  alert, oriented x 3, follows commands, speech clear and fluent, naming and repetition normal  Cranial Nerves:  EOMI with normal smooth pursuit, facial movements symmetric, hearing not formally tested but intact to conversation, no dysarthria  Motor:  no abnormal movements, able to move all limbs antigravity spontaneously with no signs of hemiparesis observed  Reflexes:  unable to test (telestroke)  Sensory:  unable to test (telestroke)  Coordination: no obvious ataxia, decreased Gogo in left hand compared to right  Station/Gait:  unable to test (telestroke)    Neuroimaging: as per HPI. I personally reviewed those images    Labs:    Coagulation studies:  No lab results found.     Lipid panel:  Recent Labs   Lab Test 09/25/21  0558 02/19/20  0455   CHOL 145 116   HDL 47 38*   LDL 73 46   TRIG 124 160*       HbA1C:  Recent Labs   Lab Test 02/17/20 2025   A1C 11.5*       Troponin:  No lab results found.      Billing:    I spent a total of 60 minutes on the day of the visit.   Time spent doing chart review, history and exam, documentation and further activities per the note

## 2021-10-29 ENCOUNTER — HOSPITAL ENCOUNTER (OUTPATIENT)
Dept: PHYSICAL THERAPY | Facility: CLINIC | Age: 66
Setting detail: THERAPIES SERIES
End: 2021-10-29
Attending: INTERNAL MEDICINE
Payer: COMMERCIAL

## 2021-10-29 ENCOUNTER — HOSPITAL ENCOUNTER (OUTPATIENT)
Dept: OCCUPATIONAL THERAPY | Facility: CLINIC | Age: 66
Setting detail: THERAPIES SERIES
End: 2021-10-29
Attending: INTERNAL MEDICINE
Payer: COMMERCIAL

## 2021-10-29 DIAGNOSIS — I63.9 ACUTE ISCHEMIC STROKE (H): ICD-10-CM

## 2021-10-29 DIAGNOSIS — Z86.73 HISTORY OF STROKE: ICD-10-CM

## 2021-10-29 DIAGNOSIS — R26.89 BALANCE PROBLEMS: ICD-10-CM

## 2021-10-29 DIAGNOSIS — R53.1 LEFT-SIDED WEAKNESS: ICD-10-CM

## 2021-10-29 PROCEDURE — 97112 NEUROMUSCULAR REEDUCATION: CPT | Mod: GP | Performed by: PHYSICAL THERAPIST

## 2021-10-29 PROCEDURE — 97165 OT EVAL LOW COMPLEX 30 MIN: CPT | Mod: GO | Performed by: OCCUPATIONAL THERAPIST

## 2021-10-29 PROCEDURE — 97112 NEUROMUSCULAR REEDUCATION: CPT | Mod: GO | Performed by: OCCUPATIONAL THERAPIST

## 2021-10-29 PROCEDURE — 97162 PT EVAL MOD COMPLEX 30 MIN: CPT | Mod: GP | Performed by: PHYSICAL THERAPIST

## 2021-10-29 PROCEDURE — 97110 THERAPEUTIC EXERCISES: CPT | Mod: GO | Performed by: OCCUPATIONAL THERAPIST

## 2021-10-29 ASSESSMENT — 6 MINUTE WALK TEST (6MWT)
TOTAL DISTANCE WALKED (FT): 750
COMMENTS: WITH SPC

## 2021-10-29 NOTE — PROGRESS NOTES
10/29/21 0700   Quick Adds   Quick Adds Certification   Type of Visit Initial OP PT Evaluation   General Information   Start of Care Date 10/29/21   Referring Physician Mk Pierre, DO   Orders Evaluate and Treat as Indicated   Order Date 09/27/21   Medical Diagnosis Acute ischemic stroke   Onset of illness/injury or Date of Surgery 09/24/21   Surgical/Medical history reviewed Yes   Pertinent history of current problem (include personal factors and/or comorbidities that impact the POC) Anson presented to ED on 9/24/21 with L sided weakness and numbness, gait instability x 1 week. Sx improving since onset. MRI outpatient through primary demonstrated acute/ subacute R occipital, frontal and parietal lobe infarcts. MRA head with focal short segment occlusion of the R ICA with distal reconstitution History of CVA in 02/20. PMH also significant for A fib with RVR, DMII, HTN. Was able to d/c home on 9/27/21. Pt states that he is moving pretty well at home. Continues to have a harder time with his L hand which makes it difficult to work. Reports no falls at home however reports that he has had balance problems since that initial stroke in 2020.    Prior level of function comment used cane, has been doing yard work/house work for exercise   Patient role/Employment history Disabled  (student loan councelor)   Living environment House/[facility]   Patient/Family Goals Statement Have balance assesed   Fall Risk Screen   Fall screen completed by PT   Have you fallen 2 or more times in the past year? No   Have you fallen and had an injury in the past year? No   Timed Up and Go score (seconds) 18.13   Is patient a fall risk? Yes;Department fall risk interventions implemented   Abuse Screen (yes response referral indicated)   Feels Unsafe at Home or Work/School no   Feels Threatened by Someone no   Does Anyone Try to Keep You From Having Contact with Others or Doing Things Outside Your Home? no   Physical Signs of  "Abuse Present no   Pain   Pain comments Reports back pain increases with prolonged standing   Cognitive Status Examination   Orientation orientation to person, place and time   Integumentary   Integumentary No deficits were identified   Strength   Strength Comments L LE grossly 4+/5   Gait   Gait Comments Ambulates with SPC, increased sway from R to L foot but does achieve heel strike bilaterally   Gait Special Tests   Gait Special Tests DYNAMIC GAIT INDEX;SIX MINUTE WALK TEST   Gait Special Tests Dynamic Gait Index   Score out of 24 12   Comments With SPC   Gait Special Tests Six Minute Walk Test   Feet 750 Feet   Comments with SPC   Balance Special Tests   Balance Special Tests Sit to stand reps;Modified CTSIB Conditions   Balance Special Tests Modified CTSIB Conditions   Condition 1, seconds 30 Seconds   Condition 2, seconds 4 Seconds  (30 on 2nd attempt)   Condition 4, seconds 15 Seconds   Condition 5, seconds 0 Seconds   Balance Special Tests Sit to Stand Reps in 30 Seconds   Reps in 30 seconds 9   Height 17\"   Comments using UEs, 0 reps without UEs   Sensory Examination   Sensory Perception Comments L hand numbness   Muscle Tone   Muscle Tone Comments appears to have some rigidity through his trunk   Planned Therapy Interventions   Planned Therapy Interventions balance training   Clinical Impression   Criteria for Skilled Therapeutic Interventions Met yes, treatment indicated   PT Diagnosis Impaired balance and gait   Influenced by the following impairments functional weakness, fatigue, pain, numbness   Functional limitations due to impairments difficulty with transfers, gait, uneven surfaces, increased fall risk   Clinical Presentation Evolving/Changing   Clinical Presentation Rationale pain, numbness   Clinical Decision Making (Complexity) Moderate complexity   Therapy Frequency 1 time/week   Predicted Duration of Therapy Intervention (days/wks) 90 days   Risk & Benefits of therapy have been explained Yes "   Patient, Family & other staff in agreement with plan of care Yes   GOALS   PT Eval Goals 1;2;3;4   Goal 1   Goal Identifier DGI   Goal Description The pt will improve DGI score to >17/24, indicating a reduction in fall risk   Target Date 01/26/22   Goal 2   Goal Identifier mCTSIB   Goal Description The pt will maintain balance on first 4 conditions of mCTSIB due to improvements in static stability and ability to utilize proprioception    Target Date 01/26/22   Goal 3   Goal Identifier Chair stand   Goal Description Due to improvements in functional LE strength the pt will complete 11 sit to stands with UE support, improving ability to transfer from a variety of surface   Target Date 01/26/22   Goal 4   Goal Identifier 6MWT   Goal Description The pt will ambulate>950' in 6 minutes, improving ability to ambulate in the community   Target Date 01/26/22   Total Evaluation Time   PT Clarice, Moderate Complexity Minutes (49425) 45   Therapy Certification   Certification date from 10/29/21   Certification date to 01/26/22   Medical Diagnosis Acute ischemic stroke

## 2021-10-29 NOTE — PROGRESS NOTES
[facility]        OUTPATIENT PHYSICAL THERAPY FUNCTIONAL EVALUATION  PLAN OF TREATMENT FOR OUTPATIENT REHABILITATION  (COMPLETE FOR INITIAL CLAIMS ONLY)  Patient's Last Name, First Name, M.I.  YOB: 1955  Anson Moise     Provider's Name   [facility]   Medical Record No.  [id number]     Start of Care Date:  10/29/21   Onset Date:  09/24/21   Type:     _X__PT   ____OT  ____SLP Medical Diagnosis:  Acute ischemic stroke     PT Diagnosis:  Impaired balance and gait Visits from SOC:  1                              __________________________________________________________________________________  Plan of Treatment/Functional Goals:  balance training           GOALS  DGI  The pt will improve DGI score to >17/24, indicating a reduction in fall risk  01/26/22    mCTSIB  The pt will maintain balance on first 4 conditions of mCTSIB due to improvements in static stability and ability to utilize proprioception   01/26/22    Chair stand  Due to improvements in functional LE strength the pt will complete 11 sit to stands with UE support, improving ability to transfer from a variety of surface  01/26/22    6MWT  The pt will ambulate>950' in 6 minutes, improving ability to ambulate in the community  01/26/22                                                Therapy Frequency:  1 time/week   Predicted Duration of Therapy Intervention:  90 days    Siena Daniel, PT                                    I CERTIFY THE NEED FOR THESE SERVICES FURNISHED UNDER        THIS PLAN OF TREATMENT AND WHILE UNDER MY CARE     (Physician co-signature of this document indicates review and certification of the therapy plan).                Certification Date From:  10/29/21   Certification Date To:  01/26/22    Referring Provider:  Mk Pierre, DO    Initial Assessment  See Epic  Evaluation- Start of Care Date: 10/29/21

## 2021-11-01 ENCOUNTER — HOSPITAL ENCOUNTER (OUTPATIENT)
Dept: OCCUPATIONAL THERAPY | Facility: CLINIC | Age: 66
Setting detail: THERAPIES SERIES
End: 2021-11-01
Attending: INTERNAL MEDICINE
Payer: COMMERCIAL

## 2021-11-01 PROCEDURE — 97112 NEUROMUSCULAR REEDUCATION: CPT | Mod: GO | Performed by: OCCUPATIONAL THERAPIST

## 2021-11-01 PROCEDURE — 97110 THERAPEUTIC EXERCISES: CPT | Mod: GO | Performed by: OCCUPATIONAL THERAPIST

## 2021-11-02 NOTE — PROGRESS NOTES
10/29/21 1000   Quick Adds   Quick Adds Certification   Type of Visit Initial Outpatient Occupational Therapy Evaluation       Present No   General Information   Start Of Care Date 10/29/21   Referring Physician Mk Pierre, DO    Orders Evaluate and treat as indicated   Orders Date 09/27/21   Medical Diagnosis Acute ischemic stroke (H) I63.9    Onset of Illness/Injury or Date of Surgery 09/24/21   Precautions/Limitations No known precautions/limitations   Special Instructions Has PT orders and has initiated treatment    Surgical/Medical History Reviewed Yes   Additional Occupational Profile Info/Pertinent History of Current Problem Anson Moise is a 65 year old, right hand dominant male presenting for OP OT evaluation at the request of Dr. Mk Pierre in regards to continued L UE weakness and incoordination secondary to a right hemisphere stroke.  Since discharge from hospital, patient reports continued LUE coordination deficits as well as bilateral LE numbness.   PMH pertinent for atrial fibrillation (on Eliquis), prior stroke, DM2, and HTN.Â Inpatient evaluation revealed progression of ICAD since prior imaging completed in 2020.Â Of, a MR of the C spine indicates severe narrowing of the C4-5 and he is being considered for neurosurgical intervention.     Role/Living Environment   Patient role/Employment history Employed;Disabled  (Student  )   Community/Avocational Activities Yardwork    Current Living Environment House   Home/Community Accessibility Comments Patient indicates no concerns in regards to accessibility of home.     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    Patient/family Goals Statement To increase use of my L hand, increase strength    Pain   Patient currently in pain No   Fall Risk Screen   Fall screen completed by PT   Have you fallen 2 or more times in the past year? No   Have you fallen and had an injury in the past year? No   Timed Up and Go score (seconds) 18.13   Is patient a fall risk? Yes;Department fall risk interventions implemented   Abuse Screen (yes response referral indicated)   Feels Unsafe at Home or Work/School no   Feels Threatened by Someone no   Does Anyone Try to Keep You From Having Contact with Others or Doing Things Outside Your Home? no   Physical Signs of Abuse Present no   Cognitive Status Examination   Orientation Orientation to person, place and time   Level of Consciousness Alert   Follows Commands and Answers Questions 100% of the time;Able to follow single-step instructions   Personal Safety and Judgment Impaired  (Has resumed driving )   Attention No deficits were identified   Organization/Problem Solving No deficits were identified   Executive Function No deficits were identified   Cognitive Comment Will continue to address cognition as indicated, patient does not endorse difficulty with memory, attention or problem solving    Visual Perception   Visual Perception Wears glasses   Visual Perception Comments Does not endorse changes in vision, no indication of concerns through education    Sensation   Sensation Comments Patient notes changes in sensation through L UE, less refined with difficulty coordinating movements    Range of Motion (ROM)   ROM Quick Adds No deficits identified   ROM Comments UE ROM WFL, equal bilaterally with some lag on L to achieve equal posturing    Strength   Strength Comments RUE: 5/5- WNL, LUE: 4/5 or 4+/5 through all planes of motion assessed    Hand Strength   Hand Dominance Right   Left Hand  (pounds) 35 pounds   Right Hand   (pounds) 48 pounds   Left Lateral Pinch (pounds) 11 pounds   Right Lateral Pinch (pounds) 11 pounds   Left Three Point Pinch (pounds) 8 pounds   Right Three Point Pinch (pounds) 13.5 pounds   Muscle Tone   Muscle Tone No deficits were identified   Coordination   Left Hand, Nine Hole Peg Test (seconds) 54    Right Hand, Nine Hole Peg Test (seconds) 30    Transfer Skill   Level of [place]: Transfers independent   Bathing   Level of [place] - Bathing independent   Upper Body Dressing   Level of [place]: Dress Upper Body independent   Lower Body Dressing   Level of [place]: Dress Lower Body independent   Toileting   Level of [place]: Toilet independent   Grooming   Level of [place]: Grooming independent   Eating/Self-Feeding   Level of [place]: Eating independent   Activity Tolerance   Activity Tolerance Fatigues more quickly per report, indicates that he is now taking more breaks and indicates need to allot more time for yard projects or other physical endeavors.     Instrumental Activities of Daily Living Assessment   IADL Assessment/Observations Patient indicates that he lives alone in a home and has been performing all I/ADL's independently, albeit less efficiently due to persistent weakness and in-coordination through non-dominant L hand.     Planned Therapy Interventions   Planned Therapy Interventions Community/work reintegration;Coordination training;Neuromuscular re-education;ROM;Self care/Home management;Stretching;Strengthening;Therapeutic activities;ADL training   Adult OT Eval Goals   OT Eval Goals (Adult) 1;2;3    OT Goal 1   Goal Identifier Fine Motor Coordination    Goal Description Patient will demonstrate independent compliance with fine motor coordination home programming recommendations as indicated by a score of 40 seconds (currently 54) or less on the 9 hole peg test through L hand as needed for increased efficiency and independence in tasks such as typing.     Target Date 12/31/21    OT Goal 2   Goal Identifier Strengthening    Goal Description Patient will demonstrate improved strength through LUE as measured by a  strength of 5 pounds (to 40lbs) and indicate compliance with proximal strengthening home programming by achievement of 5/5 MMT grading through all planes of LUE motion.      Target Date 12/31/21    OT Goal 3   Goal Identifier Fatigue/Activity Tolerance    Goal Description Patient will demonstrate independent incorporation of fatigue management strategies as measured by a score of 35 or higher on the FACIT    Target Date 12/31/21   Clinical Impression   Criteria for Skilled Therapeutic Interventions Met Yes, treatment indicated   OT Diagnosis Decreased ease, independence and efficiency in IADL's    Influenced by the following impairments fine motor coordination deficits, strength deficits, fatigue    Assessment of Occupational Performance 1-3 Performance Deficits   Identified Performance Deficits Fine motor tasks such as typing    Clinical Decision Making (Complexity) Low complexity   Therapy Frequency 1x/week    Predicted Duration of Therapy Intervention (days/wks) 8 weeks    Risks and Benefits of Treatment have been explained. Yes   Patient, Family & other staff in agreement with plan of care Yes   Education Assessment   Barriers To Learning No Barriers   Preferred Learning Style Listening;Demonstration;Pictures/video   Therapy Certification   Certification date from 10/29/21   Certification date to 12/31/21   Certification I certify the need for these services furnished under this plan of treatment and while under my care.  (Physician co-signature of this document indicates review and certification of the therapy plan)   Total Evaluation Time   OT Eval, Low Complexity Minutes (13675) 20       Thank you for referring Anson to Occupational TherapyKacie/DUNIA

## 2021-11-02 NOTE — PROGRESS NOTES
[facility]          OUTPATIENT OCCUPATIONAL THERAPY  EVALUATION  PLAN OF TREATMENT FOR OUTPATIENT REHABILITATION  (COMPLETE FOR INITIAL CLAIMS ONLY)  Patient's Last Name, First Name, M.I.  YOB: 1955  Anson Moise                        Provider's Name  [facility] Medical Record No.  [id number]                               Onset Date:     09/24/21   Start of Care Date:     10/29/21   Type:     ___PT   _X_OT   ___SLP Medical Diagnosis:     Acute ischemic stroke (H) I63.9                           OT Diagnosis:     Decreased ease, independence and efficiency in IADL's  Visits from SOC:  1   _________________________________________________________________________________  Plan of Treatment/Functional Goals:  Community/work reintegration, Coordination training, Neuromuscular re-education, ROM, Self care/Home management, Stretching, Strengthening, Therapeutic activities, ADL training                    Goals  Goal Identifier: Fine Motor Coordination   Goal Description: Patient will demonstrate independent compliance with fine motor coordination home programming recommendations as indicated by a score of 40 seconds (currently 54) or less on the 9 hole peg test through L hand as needed for increased efficiency and independence in tasks such as typing.   Target Date: 12/31/21     Goal Identifier: Strengthening   Goal Description: Patient will demonstrate improved strength through LUE as measured by a  strength of 5 pounds (to 40lbs) and indicate compliance with proximal strengthening home programming by achievement of 5/5 MMT grading through all planes of LUE motion.     Target Date: 12/31/21     Goal Identifier: Fatigue/Activity Tolerance   Goal Description: Patient will demonstrate independent incorporation of fatigue management strategies as measured by a score  of 35 or higher on the FACIT   Target Date: 12/31/21                                                                      Therapy Frequency: 1x/week      Predicted Duration of Therapy Intervention (days/wks): 8 weeks   Kacie Alvarez OT          I CERTIFY THE NEED FOR THESE SERVICES FURNISHED UNDER        THIS PLAN OF TREATMENT AND WHILE UNDER MY CARE     (Physician co-signature of this document indicates review and certification of the therapy plan).                 ,    Certification date from: 10/29/21, Certification date to: 12/31/21               Referring Physician: Mk Pierre, DO      Initial Assessment        See Epic Evaluation      Start Of Care Date: 10/29/21

## 2021-11-12 ENCOUNTER — HOSPITAL ENCOUNTER (OUTPATIENT)
Dept: OCCUPATIONAL THERAPY | Facility: CLINIC | Age: 66
Setting detail: THERAPIES SERIES
End: 2021-11-12
Attending: INTERNAL MEDICINE
Payer: COMMERCIAL

## 2021-11-12 PROCEDURE — 97112 NEUROMUSCULAR REEDUCATION: CPT | Mod: GO | Performed by: OCCUPATIONAL THERAPIST

## 2021-11-12 PROCEDURE — 97530 THERAPEUTIC ACTIVITIES: CPT | Mod: GO | Performed by: OCCUPATIONAL THERAPIST

## 2021-11-17 ENCOUNTER — HOSPITAL ENCOUNTER (OUTPATIENT)
Dept: PHYSICAL THERAPY | Facility: CLINIC | Age: 66
Setting detail: THERAPIES SERIES
End: 2021-11-17
Attending: INTERNAL MEDICINE
Payer: COMMERCIAL

## 2021-11-17 PROCEDURE — 97112 NEUROMUSCULAR REEDUCATION: CPT | Mod: GP | Performed by: PHYSICAL THERAPIST

## 2021-11-17 PROCEDURE — 97110 THERAPEUTIC EXERCISES: CPT | Mod: GP | Performed by: PHYSICAL THERAPIST

## 2021-11-23 ENCOUNTER — HOSPITAL ENCOUNTER (OUTPATIENT)
Dept: PHYSICAL THERAPY | Facility: CLINIC | Age: 66
Setting detail: THERAPIES SERIES
End: 2021-11-23
Attending: INTERNAL MEDICINE
Payer: COMMERCIAL

## 2021-11-23 ENCOUNTER — HOSPITAL ENCOUNTER (OUTPATIENT)
Dept: OCCUPATIONAL THERAPY | Facility: CLINIC | Age: 66
Setting detail: THERAPIES SERIES
End: 2021-11-23
Attending: INTERNAL MEDICINE
Payer: COMMERCIAL

## 2021-11-23 PROCEDURE — 97530 THERAPEUTIC ACTIVITIES: CPT | Mod: GO | Performed by: OCCUPATIONAL THERAPIST

## 2021-11-23 PROCEDURE — 97110 THERAPEUTIC EXERCISES: CPT | Mod: GO | Performed by: OCCUPATIONAL THERAPIST

## 2021-11-23 PROCEDURE — 97112 NEUROMUSCULAR REEDUCATION: CPT | Mod: GP | Performed by: PHYSICAL THERAPIST

## 2021-11-23 PROCEDURE — 97110 THERAPEUTIC EXERCISES: CPT | Mod: GP | Performed by: PHYSICAL THERAPIST

## 2021-11-30 ENCOUNTER — HOSPITAL ENCOUNTER (OUTPATIENT)
Dept: PHYSICAL THERAPY | Facility: CLINIC | Age: 66
Setting detail: THERAPIES SERIES
End: 2021-11-30
Attending: INTERNAL MEDICINE
Payer: COMMERCIAL

## 2021-11-30 ENCOUNTER — HOSPITAL ENCOUNTER (OUTPATIENT)
Dept: OCCUPATIONAL THERAPY | Facility: CLINIC | Age: 66
Setting detail: THERAPIES SERIES
End: 2021-11-30
Attending: INTERNAL MEDICINE
Payer: COMMERCIAL

## 2021-11-30 PROCEDURE — 97110 THERAPEUTIC EXERCISES: CPT | Mod: GP | Performed by: PHYSICAL THERAPIST

## 2021-11-30 PROCEDURE — 97112 NEUROMUSCULAR REEDUCATION: CPT | Mod: GP | Performed by: PHYSICAL THERAPIST

## 2021-11-30 PROCEDURE — 97112 NEUROMUSCULAR REEDUCATION: CPT | Mod: GO | Performed by: OCCUPATIONAL THERAPIST

## 2022-01-15 ENCOUNTER — HEALTH MAINTENANCE LETTER (OUTPATIENT)
Age: 67
End: 2022-01-15

## 2022-01-26 ENCOUNTER — TELEPHONE (OUTPATIENT)
Dept: NEUROLOGY | Facility: CLINIC | Age: 67
End: 2022-01-26
Payer: COMMERCIAL

## 2022-02-15 ENCOUNTER — VIRTUAL VISIT (OUTPATIENT)
Dept: NEUROLOGY | Facility: CLINIC | Age: 67
End: 2022-02-15
Payer: COMMERCIAL

## 2022-02-15 DIAGNOSIS — I63.9 ISCHEMIC STROKE (H): Primary | ICD-10-CM

## 2022-02-15 PROCEDURE — 99443 PR PHYSICIAN TELEPHONE EVALUATION 21-30 MIN: CPT

## 2022-02-15 NOTE — PROGRESS NOTES
Anson is a 66 year old who is being evaluated via a billable telephone visit.      What phone number would you like to be contacted at? [phone]  How would you like to obtain your AVS? Shellie  Phone call duration: 21 minutes    __________________________________________________________      [facility]    Virtual Clinic - [phone]  __________________________________________________________    Chief Complaint: Patient presents with:  Stroke      History of Present Illness: Anson Moise is a 65 year old male presenting for follow-up for right hemisphere stroke. He initially presented to his PCP 9/24 for evaluation of left arm numbness/weakness and gait imbalance that began 10 days prior. Outpatient imaging revealed right hemisphere infarcts and distal R ICA severe stenosis vs occlusion. He was directed to present to the [facility] ED for further evaluation. Prior to the hospital stay, he had a past medical history of atrial fibrillation (on Eliquis), prior stroke, DM2, and HTN. Inpatient evaluation revealed progression of ICAD since prior imaging completed in 2020.      Stroke Evaluation Summarized  MRI/Head CT MRI at outside hospital revealed right cerebral hemisphere infarcts involving the frontal and parietal lobes   Intracranial Vasculature CTA head with moderate focal stenosis at the proximal cavernous left ICA, severe multifocal stenosis of left V4. MRA head from OSH demonstrated short segment right ICA occlusion in petrous segment. Patient has right fetal PCA. 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 There is high-grade stenosis at origin of this vessel on 2/18/2020. Attenuated reconstitution at the mid V2 segment with a patent V3 segment.      Echocardiogram EF 60-65%, normal left and right atrial size, no atrial shunt reported   EKG/Telemetry SR    Other Testing Not Applicable      LDL  9/25/2021:  "73 mg/dL   A1C  6.2   Troponin 9/25/2021: <0.015 ug/L     He was discharged on Eliquis + ASA 81 mg daily for recurrent stroke prevention. At our last visit, he noted continued difficulty with fine motor movements of his left hand. Since then, he notes that the left arm is improving but not back to baseline. He unfortunately fell a few weeks ago and thinks he sprained his ankle. He was advised to go to the ED but did not seek evaluation. The pain is significantly improved and he can now walk again.    Modified [place] Scale  Score: 2-Slight disability; unable to carry out all previous activities, but able to look after own affairs    Impression:   Problem List Items Addressed This Visit        Nervous and Auditory    Ischemic stroke (H) - Primary        65 M with vascular risk factors (HTN, HLD, DM2) and atrial fibrillation (on AC) found to have new right hemisphere strokes in the setting of severe distal R ICA stenosis.     Plan:   - Continue Eliquis 5 mg BID + ASA 81 mg daily. Recommend continuing AC + AP until 3/27/22 (6 months total), then discontinue aspirin and continue Eliquis alone.   - Continue Crestor 10 mg daily with goal LDL 40-70  - Has not been checking BP regularly. Goal normotension  - Sleep study recommended. He would like to have this completed within [facility] and will contact his PCP about a referral.   - Now smoking 0-3 cigarettes per day, encouraged continued efforts for smoking cessation     - Follow-up in 6 months      Stroke Education provided.  He will call us with any questions.  For any acute neurologic deficits he was advised to  go directly to the hospital rather than call the clinic.    Valerie Deleon CNP  Neurology  02/15/2022 4:18 PM  To page me or covering stroke neurology team member, click here: [facility]  Choose \"On Call\" tab at top, then search dropdown box for \"Neurology Adult\" & press Enter, look for Neuro " "ICU/Stroke    ___________________________________________________________________    Current Medications  Current Outpatient Medications   Medication Sig     apixaban ANTICOAGULANT (ELIQUIS) 5 MG tablet Take 1 tablet (5 mg) by mouth 2 times daily     aspirin (ASA) 81 MG EC tablet Take 1 tablet (81 mg) by mouth daily     glipiZIDE (GLUCOTROL XL) 5 MG 24 hr tablet Take 1 tablet (5 mg) by mouth every morning (before breakfast)     metFORMIN (GLUCOPHAGE-XR) 500 MG 24 hr tablet Take 2 tablets (1,000 mg) by mouth daily (with dinner)     metoprolol tartrate (LOPRESSOR) 25 MG tablet Take 1 tablet (25 mg) by mouth 2 times daily     pantoprazole (PROTONIX) 40 MG EC tablet Take 1 tablet (40 mg) by mouth 2 times daily (before meals)     rosuvastatin (CRESTOR) 5 MG tablet Take 5 mg by mouth daily     No current facility-administered medications for this visit.       Past Medical History  Past Medical History:   Diagnosis Date     Cerebral infarction (H)      Diabetes (H)      Hypertension        Social History  Social History     Tobacco Use     Smoking status: Current Every Day Smoker     Packs/day: 0.25     Types: Cigarettes     Smokeless tobacco: Never Used     Tobacco comment: trying to quit   Substance Use Topics     Alcohol use: Yes     Comment: few drinks a night     Drug use: Never       Family History  No family history on file.    Physical Exam    Vitals - Patient Reported 10/21/2021 2/15/2022   Weight (Patient Reported) 197 lb 205 lb             Estimated body mass index is 29.17 kg/m  as calculated from the following:    Height as of 9/25/21: 1.753 m (5' 9\").    Weight as of 9/25/21: 89.6 kg (197 lb 8 oz).      Neurologic  Telephone only visit: Speech clear and fluent. No dysarthria or word finding difficulty.     Neuroimaging: as per HPI. I personally reviewed those images    Labs:    Coagulation studies:  No lab results found.     Lipid panel:  Recent Labs   Lab Test 09/25/21  0558 02/19/20  0455   CHOL 145 116 "   HDL 47 38*   LDL 73 46   TRIG 124 160*       HbA1C:  Recent Labs   Lab Test 02/17/20 2025   A1C 11.5*       Troponin:  No lab results found.      Billing:    I spent a total of 40 minutes on the day of the visit.   Time spent doing chart review, history and exam, documentation and further activities per the note

## 2022-05-07 ENCOUNTER — HEALTH MAINTENANCE LETTER (OUTPATIENT)
Age: 67
End: 2022-05-07

## 2022-07-26 ENCOUNTER — TELEPHONE (OUTPATIENT)
Dept: NEUROLOGY | Facility: CLINIC | Age: 67
End: 2022-07-26

## 2022-07-26 NOTE — TELEPHONE ENCOUNTER
Per recall report due for Virtual Stroke/TIA Neurology follow up in August 2022.    JORGE DAN, CMA

## 2022-08-08 NOTE — TELEPHONE ENCOUNTER
Spoke to patient, declined scheduling. States he is seeing Neurology through a different system.    MANASA.    JORGE DAN CMA

## 2022-08-27 ENCOUNTER — HEALTH MAINTENANCE LETTER (OUTPATIENT)
Age: 67
End: 2022-08-27

## 2022-09-27 NOTE — TELEPHONE ENCOUNTER
Called to schedule virtual follow up CS stroke JENNY Valerie kaur. Left VM.    JORGE DAN, ELEAZAR    
Per recall report - DUe for follow up seen A.S 10/2021    JORGE DAN, CMA    
Spoke to patient, scheduled and confirmed.    JORGE DAN, CMA    
home w/ assist

## 2022-12-26 ENCOUNTER — HEALTH MAINTENANCE LETTER (OUTPATIENT)
Age: 67
End: 2022-12-26

## 2023-04-22 ENCOUNTER — HEALTH MAINTENANCE LETTER (OUTPATIENT)
Age: 68
End: 2023-04-22

## 2023-09-17 ENCOUNTER — HEALTH MAINTENANCE LETTER (OUTPATIENT)
Age: 68
End: 2023-09-17

## 2023-11-30 NOTE — TELEPHONE ENCOUNTER
Per neurology fellow, given that pt is currently on dual therapy with asa and AC they want follow-up done within 8 weeks at the latest given higher risk being on both AC and asa. RNCC will reach out to Dr. Darby to see if we can add pt into his schedule rather than next available in December.    Vikki Clemons BS, RN, SCRN  RN Stroke Neurology Care Coordinator  [facility] Neuroscience Service Line     not applicable (Male)

## 2024-02-04 ENCOUNTER — HEALTH MAINTENANCE LETTER (OUTPATIENT)
Age: 69
End: 2024-02-04

## 2024-06-23 ENCOUNTER — HEALTH MAINTENANCE LETTER (OUTPATIENT)
Age: 69
End: 2024-06-23

## 2024-11-10 ENCOUNTER — HEALTH MAINTENANCE LETTER (OUTPATIENT)
Age: 69
End: 2024-11-10

## (undated) RX ORDER — FENTANYL CITRATE 50 UG/ML
INJECTION, SOLUTION INTRAMUSCULAR; INTRAVENOUS
Status: DISPENSED
Start: 2020-02-19